# Patient Record
Sex: FEMALE | Race: WHITE | Employment: OTHER | URBAN - METROPOLITAN AREA
[De-identification: names, ages, dates, MRNs, and addresses within clinical notes are randomized per-mention and may not be internally consistent; named-entity substitution may affect disease eponyms.]

---

## 2017-02-22 ENCOUNTER — OFFICE VISIT (OUTPATIENT)
Dept: FAMILY MEDICINE CLINIC | Age: 67
End: 2017-02-22

## 2017-02-22 ENCOUNTER — HOSPITAL ENCOUNTER (OUTPATIENT)
Dept: LAB | Age: 67
Discharge: HOME | End: 2017-02-22
Payer: MEDICARE

## 2017-02-22 VITALS
DIASTOLIC BLOOD PRESSURE: 86 MMHG | RESPIRATION RATE: 16 BRPM | SYSTOLIC BLOOD PRESSURE: 148 MMHG | BODY MASS INDEX: 28.66 KG/M2 | HEIGHT: 60 IN | TEMPERATURE: 96.7 F | WEIGHT: 146 LBS | OXYGEN SATURATION: 98 % | HEART RATE: 64 BPM

## 2017-02-22 DIAGNOSIS — Z51.81 ENCOUNTER FOR MEDICATION MONITORING: ICD-10-CM

## 2017-02-22 DIAGNOSIS — Z00.00 MEDICARE ANNUAL WELLNESS VISIT, SUBSEQUENT: Primary | ICD-10-CM

## 2017-02-22 DIAGNOSIS — I10 ESSENTIAL HYPERTENSION: ICD-10-CM

## 2017-02-22 DIAGNOSIS — Z86.39 H/O: HYPOTHYROIDISM: ICD-10-CM

## 2017-02-22 DIAGNOSIS — Z11.59 NEED FOR HEPATITIS C SCREENING TEST: ICD-10-CM

## 2017-02-22 LAB
BILIRUB UR QL STRIP: NEGATIVE
GLUCOSE UR-MCNC: NEGATIVE MG/DL
KETONES P FAST UR STRIP-MCNC: NEGATIVE MG/DL
PH UR STRIP: 7 [PH] (ref 4.6–8)
PROT UR QL STRIP: NEGATIVE MG/DL
SP GR UR STRIP: 1.01 (ref 1–1.03)
UA UROBILINOGEN AMB POC: NORMAL (ref 0.2–1)
URINALYSIS CLARITY POC: CLEAR
URINALYSIS COLOR POC: YELLOW
URINE BLOOD POC: NEGATIVE
URINE LEUKOCYTES POC: NEGATIVE
URINE NITRITES POC: NEGATIVE

## 2017-02-22 PROCEDURE — 80061 LIPID PANEL: CPT

## 2017-02-22 PROCEDURE — 84443 ASSAY THYROID STIM HORMONE: CPT

## 2017-02-22 PROCEDURE — 36415 COLL VENOUS BLD VENIPUNCTURE: CPT

## 2017-02-22 PROCEDURE — 86803 HEPATITIS C AB TEST: CPT

## 2017-02-22 PROCEDURE — 80053 COMPREHEN METABOLIC PANEL: CPT

## 2017-02-22 PROCEDURE — 85027 COMPLETE CBC AUTOMATED: CPT

## 2017-02-22 PROCEDURE — 84439 ASSAY OF FREE THYROXINE: CPT

## 2017-02-22 RX ORDER — LISINOPRIL 5 MG/1
5 TABLET ORAL EVERY EVENING
COMMUNITY

## 2017-02-22 NOTE — PROGRESS NOTES
Pt here for Medicare Wellness visit. Lipid 1/26/2016    A1C 1/26/2016    Mammogram 1/14/2016    Bone density 1/26/2016    Colonoscopy 11/4/2014 by Dr. Vasquez End- repeat in 5 years.       Verbal order received per Dr. Mathew De La Rosa- obtain UA without micro- VORB

## 2017-02-22 NOTE — PROGRESS NOTES
This is a Subsequent Medicare Annual Wellness Visit providing Personalized Prevention Plan Services (PPPS) (Performed 12 months after initial AWV and PPPS )    I have reviewed the patient's medical history in detail and updated the computerized patient record. BP follow up:  Compliant w/  low salt diet, and exercise. Changed to lisinopril by cardiology. BP home monitoring 130s/70s. Home bp monitoring has been elevated but improved since starting on the medication. No swelling, headache or dizziness. No chest pain, SOB, palpitations. Thyroid Review:  H/o hypothyroidism. Currently not on medication. Thyroid ROS: denies fatigue, weight changes, heat/cold intolerance, bowel/skin changes or CVS symptoms. History     Past Medical History:   Diagnosis Date    Bicuspid aortic valve     Hypertension     Osteoporosis       Past Surgical History:   Procedure Laterality Date    ENDOSCOPY, COLON, DIAGNOSTIC      HX GYN      left ovarian cyst removed    HX ORTHOPAEDIC      cyst left hand    HX ORTHOPAEDIC Bilateral     torn meniscus    HX ORTHOPAEDIC      bakers cyst    HX TONSILLECTOMY       Current Outpatient Prescriptions   Medication Sig Dispense Refill    lisinopril (PRINIVIL, ZESTRIL) 5 mg tablet Take 5 mg by mouth every evening.  fluticasone (FLONASE) 50 mcg/actuation nasal spray 2 Sprays by Both Nostrils route daily as needed for Rhinitis. 3 Bottle 3    raloxifene (EVISTA) 60 mg tablet Take 60 mg by mouth daily.  BIOTIN PO Take 1 tablet by mouth daily.  multivitamin (ONE A DAY) tablet Take 1 tablet by mouth daily.  CETIRIZINE HCL (ZYRTEC PO) Take 1 Tab by mouth daily.  CALCIUM CARBONATE/VITAMIN D3 (CALCIUM + D PO) Take 1 tablet by mouth two (2) times a day.  aspirin delayed-release (ECOTRIN) 325 mg tablet Take 325 mg by mouth daily.        Allergies   Allergen Reactions    Penicillin G Hives     Family History   Problem Relation Age of Onset    Heart Disease Mother     Cancer Mother 80     colorectal cancer    Cancer Father      neck    Cancer Maternal Grandmother      ?colon cancer    Cancer Paternal Grandmother      ?colon cancer    Cancer Other      breast - cousins     Social History   Substance Use Topics    Smoking status: Never Smoker    Smokeless tobacco: Never Used    Alcohol use 0.0 oz/week     0 Standard drinks or equivalent per week      Comment: occasionally - very little     Patient Active Problem List   Diagnosis Code    Osteoporosis M81.0    Bicuspid aortic valve Q23.1    H/O: hypothyroidism Z86.39    Essential hypertension I10    Encounter for medication monitoring Z51.81       Depression Risk Factor Screening:     PHQ 2 / 9, over the last two weeks 2/22/2017   Little interest or pleasure in doing things Not at all   Feeling down, depressed or hopeless Not at all   Total Score PHQ 2 0     Alcohol Risk Factor Screening: On any occasion during the past 3 months, have you had more than 3 drinks containing alcohol? No    Do you average more than 7 drinks per week? No      Functional Ability and Level of Safety:     Hearing Loss   normal-to-mild    Activities of Daily Living   Self-care. Requires assistance with: no ADLs    Fall Risk     Fall Risk Assessment, last 12 mths 2/22/2017   Able to walk? Yes   Fall in past 12 months?  No     Abuse Screen   Patient is not abused    Review of Systems   Constitutional: negative for fatigue and malaise  Eyes: negative for irritation and redness  Ears, nose, mouth, throat, and face: negative for earaches, nasal congestion and hoarseness  Respiratory: negative for cough, sputum or dyspnea on exertion  Cardiovascular: negative for chest pain, chest pressure/discomfort, dyspnea, palpitations, irregular heart beats, fatigue, lower extremity edema  Gastrointestinal: negative for dysphagia, dyspepsia, reflux symptoms, nausea, vomiting, change in bowel habits, melena, constipation and abdominal pain  Genitourinary:negative for frequency, dysuria, nocturia, urinary incontinence. Integument/breast: negative for rash and dryness  Hematologic/lymphatic: negative for easy bruising and lymphadenopathy  Musculoskeletal:negative for myalgias, arthralgias, stiff joints, neck pain, back pain and muscle weakness  Neurological: negative for headaches, dizziness, tremor and weakness  Endocrine: negative for diabetic symptoms including polyuria and polydipsia        Physical Examination     Evaluation of Cognitive Function:  Mood/affect:  happy  Appearance: age appropriate  Family member/caregiver input: NA    Visit Vitals    /86    Pulse 64    Temp 96.7 °F (35.9 °C) (Oral)    Resp 16    Ht 5' (1.524 m)    Wt 146 lb (66.2 kg)    LMP 01/01/2004    SpO2 98%    BMI 28.51 kg/m2     General:  Alert, cooperative, no distress, appears stated age. Head:  Normocephalic, without obvious abnormality, atraumatic. Eyes:  Conjunctivae/corneas clear. PERRL, EOMs intact. Fundi benign. Ears:  Normal TMs and external ear canals both ears. Nose: Nares normal. Septum midline. Mucosa normal. No drainage or sinus tenderness. Throat: Lips, mucosa, and tongue normal. Teeth and gums normal.   Neck: Supple, symmetrical, trachea midline, no adenopathy, thyroid: no enlargement/tenderness/nodules, no carotid bruit and no JVD. Back:   Symmetric, no curvature. ROM normal. No CVA tenderness. Lungs:   Clear to auscultation bilaterally. Chest wall:  No tenderness or deformity. Heart:  Regular rate and rhythm, S1, S2 normal, no murmur, click, rub or gallop. Breast Exam:  No tenderness, masses, or nipple abnormality. Abdomen:   Soft, non-tender. Bowel sounds normal. No masses,  No organomegaly. Genitalia:  Deferred    Rectal:  Deferred    Extremities: Extremities normal, atraumatic, no cyanosis or edema. Pulses: 2+ and symmetric all extremities. Skin: Skin color, texture, turgor normal. No rashes or lesions. Lymph nodes: Cervical, supraclavicular, and axillary nodes normal.   Neurologic: CNII-XII intact. Normal strength, sensation and reflexes throughout. Patient Care Team:  Ning Holman MD as PCP - General    Advice/Referrals/Counseling   Education and counseling provided:  Are appropriate based on today's review and evaluation  End-of-Life planning (with patient's consent)    Assessment/Plan   Skylar Fernandez was seen today for annual wellness visit. Diagnoses and all orders for this visit:    Medicare annual wellness visit, subsequent  -     AMB POC URINALYSIS DIP STICK AUTO W/O MICRO    Essential hypertension  -     LIPID PANEL  Discussed sodium restriction, high k rich diet, maintaining ideal body weight and regular exercise program such as daily walking 30 min perday 4-5 times per week, as physiologic means to achieve blood pressure control.  Medication compliance advised. H/O: hypothyroidism  -     TSH 3RD GENERATION  -     T4, FREE    Encounter for medication monitoring  -     METABOLIC PANEL, COMPREHENSIVE  -     CBC W/O DIFF    Need for hepatitis C screening test  -     HEPATITIS C AB      Follow-up Disposition: reviewed diet, exercise and weight control  cardiovascular risk and specific lipid/LDL goals reviewed  reviewed medications and side effects in detail. I have discussed diagnosis listed in this note with pt and/or family. I have discussed treatment plans and options and the risk/benefit analysis of those options, including safe use of medications and possible medication side effects. Through the use of shared decision making we have agreed to the above plan. The patient has received an after-visit summary and questions were answered concerning future plans and follow up. Advise pt of any urgent changes then to proceed to the ER.

## 2017-02-22 NOTE — MR AVS SNAPSHOT
Visit Information Date & Time Provider Department Dept. Phone Encounter #  
 2/22/2017 10:15 AM Ginette Ventura MD [facility] [phone] [id number] Upcoming Health Maintenance Date Due Hepatitis C Screening 1950 GLAUCOMA SCREENING Q2Y 3/4/2015 Pneumococcal 65+ Low/Medium Risk (2 of 2 - PPSV23) 1/26/2017 BREAST CANCER SCRN MAMMOGRAM 1/14/2018 MEDICARE YEARLY EXAM 2/23/2018 COLONOSCOPY 11/4/2019 DTaP/Tdap/Td series (2 - Td) 1/7/2024 Allergies as of 2/22/2017  Review Complete On: 2/22/2017 By: Ginette Ventura MD  
  
 Severity Noted Reaction Type Reactions Penicillin G Low 09/02/2011    Hives Current Immunizations  Reviewed on 2/22/2017 Name Date Influenza High Dose Vaccine PF 10/20/2016, 12/14/2015 Influenza Vaccine 12/18/2012 Influenza Vaccine (Quad) PF 1/28/2015 Influenza Vaccine PF 1/7/2014 Pneumococcal Polysaccharide (PPSV-23) 1/26/2016 Tdap 1/7/2014 Reviewed by Mateo Thibodeaux LPN on 0/59/7390 at 61:01 AM  
You Were Diagnosed With   
  
 Codes Comments Medicare annual wellness visit, subsequent    -  Primary ICD-10-CM: Z00.00 ICD-9-CM: V70.0 Essential hypertension     ICD-10-CM: I10 
ICD-9-CM: 401.9 H/O: hypothyroidism     ICD-10-CM: Z86.39 
ICD-9-CM: V12.29 Encounter for medication monitoring     ICD-10-CM: Z51.81 
ICD-9-CM: V58.83 Vitals BP  
  
  
  
  
  
 148/86 Vitals History BMI and BSA Data Body Mass Index Body Surface Area 28.51 kg/m 2 1.67 m 2 Preferred Pharmacy Pharmacy Name [address] [facility] [phone] Your Updated Medication List  
  
   
This list is accurate as of: 2/22/17  4:18 PM.  Always use your most recent med list.  
  
  
  
  
 BIOTIN PO Take 1 tablet by mouth daily. CALCIUM + D PO Take 1 tablet by mouth two (2) times a day. ECOTRIN 325 mg tablet Generic drug:  aspirin delayed-release Take 325 mg by mouth daily. fluticasone 50 mcg/actuation nasal spray Commonly known as:  Drew Riser 2 Sprays by Both Nostrils route daily as needed for Rhinitis. lisinopril 5 mg tablet Commonly known as:  Elmer Shutters Take 5 mg by mouth every evening.  
  
 multivitamin tablet Commonly known as:  ONE A DAY Take 1 tablet by mouth daily. raloxifene 60 mg tablet Commonly known as:  EVISTA Take 60 mg by mouth daily. ZYRTEC PO Take 1 Tab by mouth daily. We Performed the Following AMB POC URINALYSIS DIP STICK AUTO W/O MICRO [65467 CPT(R)] CBC W/O DIFF [78836 CPT(R)] LIPID PANEL [54445 CPT(R)] METABOLIC PANEL, COMPREHENSIVE [64234 CPT(R)] T4, FREE [id number] CPT(R)] TSH 3RD GENERATION [68652 CPT(R)] Introducing [facility]! Dear Arleen Garvey: 
Thank you for requesting a [employer] account. Our records indicate that you have previously registered for a [employer] account but its currently inactive. Please call our [employer] support line at [phone]. Additional Information If you have questions, please visit the Frequently Asked Questions section of the [employer] website at [email or web address] Remember, [employer] is NOT to be used for urgent needs. For medical emergencies, dial 911. Now available from your iPhone and Android! Please provide this summary of care documentation to your next provider. Your primary care clinician is listed as Johnny Lloyd. If you have any questions after today's visit, please call [phone].

## 2017-02-23 LAB
ALBUMIN SERPL-MCNC: 4.5 G/DL (ref 3.6–4.8)
ALBUMIN/GLOB SERPL: 1.9 {RATIO} (ref 1.1–2.5)
ALP SERPL-CCNC: 50 IU/L (ref 39–117)
ALT SERPL-CCNC: 17 IU/L (ref 0–32)
AST SERPL-CCNC: 17 IU/L (ref 0–40)
BILIRUB SERPL-MCNC: 0.3 MG/DL (ref 0–1.2)
BUN SERPL-MCNC: 16 MG/DL (ref 8–27)
BUN/CREAT SERPL: 20 (ref 11–26)
CALCIUM SERPL-MCNC: 9.7 MG/DL (ref 8.7–10.3)
CHLORIDE SERPL-SCNC: 102 MMOL/L (ref 96–106)
CHOLEST SERPL-MCNC: 223 MG/DL (ref 100–199)
CO2 SERPL-SCNC: 25 MMOL/L (ref 18–29)
CREAT SERPL-MCNC: 0.79 MG/DL (ref 0.57–1)
ERYTHROCYTE [DISTWIDTH] IN BLOOD BY AUTOMATED COUNT: 14 % (ref 12.3–15.4)
GLOBULIN SER CALC-MCNC: 2.4 G/DL (ref 1.5–4.5)
GLUCOSE SERPL-MCNC: 87 MG/DL (ref 65–99)
HCT VFR BLD AUTO: 37.1 % (ref 34–46.6)
HDLC SERPL-MCNC: 77 MG/DL
HGB BLD-MCNC: 12.3 G/DL (ref 11.1–15.9)
INTERPRETATION, 910389: NORMAL
LDLC SERPL CALC-MCNC: 131 MG/DL (ref 0–99)
MCH RBC QN AUTO: 30.1 PG (ref 26.6–33)
MCHC RBC AUTO-ENTMCNC: 33.2 G/DL (ref 31.5–35.7)
MCV RBC AUTO: 91 FL (ref 79–97)
PLATELET # BLD AUTO: 217 X10E3/UL (ref 150–379)
POTASSIUM SERPL-SCNC: 4.3 MMOL/L (ref 3.5–5.2)
PROT SERPL-MCNC: 6.9 G/DL (ref 6–8.5)
RBC # BLD AUTO: 4.08 X10E6/UL (ref 3.77–5.28)
SODIUM SERPL-SCNC: 144 MMOL/L (ref 134–144)
T4 FREE SERPL-MCNC: 1.32 NG/DL (ref 0.82–1.77)
TRIGL SERPL-MCNC: 74 MG/DL (ref 0–149)
TSH SERPL DL<=0.005 MIU/L-ACNC: 1.68 UIU/ML (ref 0.45–4.5)
VLDLC SERPL CALC-MCNC: 15 MG/DL (ref 5–40)
WBC # BLD AUTO: 5.5 X10E3/UL (ref 3.4–10.8)

## 2017-02-24 LAB — HCV AB S/CO SERPL IA: <0.1 S/CO RATIO (ref 0–0.9)

## 2017-05-03 DIAGNOSIS — G47.30 SLEEP APNEA, UNSPECIFIED TYPE: Primary | ICD-10-CM

## 2017-06-14 ENCOUNTER — OFFICE VISIT (OUTPATIENT)
Dept: SLEEP MEDICINE | Age: 67
End: 2017-06-14

## 2017-06-14 VITALS
BODY MASS INDEX: 28.66 KG/M2 | DIASTOLIC BLOOD PRESSURE: 84 MMHG | TEMPERATURE: 97.8 F | HEART RATE: 87 BPM | HEIGHT: 60 IN | SYSTOLIC BLOOD PRESSURE: 125 MMHG | WEIGHT: 146 LBS | OXYGEN SATURATION: 94 %

## 2017-06-14 DIAGNOSIS — G47.33 OSA (OBSTRUCTIVE SLEEP APNEA): Primary | ICD-10-CM

## 2017-06-14 DIAGNOSIS — E66.3 OVERWEIGHT (BMI 25.0-29.9): ICD-10-CM

## 2017-06-14 NOTE — MR AVS SNAPSHOT
Visit Information Date & Time Provider Department Dept. Phone Encounter #  
 6/14/2017 10:20 AM Buck Felix MD [address] [address] [id number] Follow-up Instructions Return for call with results. Follow-up and Disposition History Upcoming Health Maintenance Date Due  
 GLAUCOMA SCREENING Q2Y 3/4/2015 Pneumococcal 65+ Low/Medium Risk (2 of 2 - PCV13) 1/26/2017 INFLUENZA AGE 9 TO ADULT 8/1/2017 BREAST CANCER SCRN MAMMOGRAM 1/14/2018 MEDICARE YEARLY EXAM 2/23/2018 COLONOSCOPY 11/4/2019 DTaP/Tdap/Td series (2 - Td) 1/7/2024 Allergies as of 6/14/2017  Review Complete On: 6/14/2017 By: Buck Felix MD  
  
 Severity Noted Reaction Type Reactions Penicillin G Low 09/02/2011    Hives Current Immunizations  Reviewed on 2/22/2017 Name Date Influenza High Dose Vaccine PF 10/20/2016, 12/14/2015 Influenza Vaccine 12/18/2012 Influenza Vaccine (Quad) PF 1/28/2015 Influenza Vaccine PF 1/7/2014 Pneumococcal Polysaccharide (PPSV-23) 1/26/2016 Tdap 1/7/2014 Not reviewed this visit You Were Diagnosed With   
  
 Codes Comments JUSTA (obstructive sleep apnea)    -  Primary ICD-10-CM: G47.33 
ICD-9-CM: 327.23 Overweight (BMI 25.0-29. 9)     ICD-10-CM: H24.9 ICD-9-CM: 278.02 Vitals BP Pulse Temp Height(growth percentile) Weight(growth percentile) LMP  
 125/84 87 97.8 °F (36.6 °C) 5' (1.524 m) 146 lb (66.2 kg) 01/01/2004 SpO2 BMI OB Status Smoking Status 94% 28.51 kg/m2 Postmenopausal Never Smoker BMI and BSA Data Body Mass Index Body Surface Area 28.51 kg/m 2 1.67 m 2 Preferred Pharmacy Pharmacy Name Phone Willie Painting [facility] [phone] Your Updated Medication List  
  
   
This list is accurate as of: 6/14/17 10:35 AM.  Always use your most recent med list.  
  
  
  
  
 BIOTIN PO  
 Take 1 tablet by mouth daily. CALCIUM + D PO Take 1 tablet by mouth two (2) times a day. ECOTRIN 325 mg tablet Generic drug:  aspirin delayed-release Take 325 mg by mouth daily. fluticasone 50 mcg/actuation nasal spray Commonly known as:  Mary Earnest 2 Sprays by Both Nostrils route daily as needed for Rhinitis. lisinopril 5 mg tablet Commonly known as:  Sydna Lies Take 5 mg by mouth every evening.  
  
 multivitamin tablet Commonly known as:  ONE A DAY Take 1 tablet by mouth daily. raloxifene 60 mg tablet Commonly known as:  EVISTA Take 60 mg by mouth daily. ZYRTEC PO Take 1 Tab by mouth daily. We Performed the Following SLEEP STUDY UNATTENDED, RESPIRATORY EFFORT  [83302 CPT(R)] Follow-up Instructions Return for call with results. Patient Instructions 217 [facility]., [place]. 1668 [facility], [facility] Tel.  [phone] Fax. 100 [facility] [address] Tel.  [phone] Fax. [phone] [address] [address] Tel.  [phone] Fax. [phone] Sleep Apnea: After Your Visit Your Care Instructions Sleep apnea occurs when you frequently stop breathing for 10 seconds or longer during sleep. It can be mild to severe, based on the number of times per hour that you stop breathing or have slowed breathing. Blocked or narrowed airways in your nose, mouth, or throat can cause sleep apnea. Your airway can become blocked when your throat muscles and tongue relax during sleep. Sleep apnea is common, occurring in 1 out of 20 individuals. Individuals having any of the following characteristics should be evaluated and treated right away due to high risk and detrimental consequences from untreated sleep apnea: 
1. Obesity 2. Congestive Heart failure 3. Atrial Fibrillation 4. Uncontrolled Hypertension 5. Type II Diabetes 6. Night-time Arrhythmias 7. Stroke 8. Pulmonary Hypertension 9. High-risk Driving Populations (pilots, truck drivers, etc.) 10. Patients Considering Weight-loss Surgery How do you know you have sleep apnea? You probably have sleep apnea if you answer 'yes' to 3 or more of the following questions: S - Have you been told that you Snore? T - Are you often Tired during the day? O - Has anyone Observed you stop breathing while sleeping? P- Do you have (or are being treated for) high blood Pressure? B - Are you obese (Body Mass Index > 35)? A - Is your Age 48years old or older? N - Is your Neck size greater than 16 inches? G - Are you male Gender? A sleep physician can prescribe a breathing device that prevents tissues in the throat from blocking your airway. Or your doctor may recommend using a dental device (oral breathing device) to help keep your airway open. In some cases, surgery may be needed to remove enlarged tissues in the throat. Follow-up care is a key part of your treatment and safety. Be sure to make and go to all appointments, and call your doctor if you are having problems. It's also a good idea to know your test results and keep a list of the medicines you take. How can you care for yourself at home? · Lose weight, if needed. It may reduce the number of times you stop breathing or have slowed breathing. · Go to bed at the same time every night. · Sleep on your side. It may stop mild apnea. If you tend to roll onto your back, sew a pocket in the back of your pajama top. Put a tennis ball into the pocket, and stitch the pocket shut. This will help keep you from sleeping on your back. · Avoid alcohol and medicines such as sleeping pills and sedatives before bed. · Do not smoke. Smoking can make sleep apnea worse. If you need help quitting, talk to your doctor about stop-smoking programs and medicines. These can increase your chances of quitting for good. · Prop up the head of your bed 4 to 6 inches by putting bricks under the legs of the bed. · Treat breathing problems, such as a stuffy nose, caused by a cold or allergies. · Use a continuous positive airway pressure (CPAP) breathing machine if lifestyle changes do not help your apnea and your doctor recommends it. The machine keeps your airway from closing when you sleep. · If CPAP does not help you, ask your doctor whether you should try other breathing machines. A bilevel positive airway pressure machine has two types of air pressureâone for breathing in and one for breathing out. Another device raises or lowers air pressure as needed while you breathe. · If your nose feels dry or bleeds when using one of these machines, talk with your doctor about increasing moisture in the air. A humidifier may help. · If your nose is runny or stuffy from using a breathing machine, talk with your doctor about using decongestants or a corticosteroid nasal spray. When should you call for help? Watch closely for changes in your health, and be sure to contact your doctor if: 
· You still have sleep apnea even though you have made lifestyle changes. · You are thinking of trying a device such as CPAP. · You are having problems using a CPAP or similar machine. Where can you learn more? Go to Extended Systems.be. Enter Z623 in the search box to learn more about \"Sleep Apnea: After Your Visit. \"  
© 1979-3213 Healthwise, Incorporated. Care instructions adapted under license by Rylan Roy (which disclaims liability or warranty for this information). This care instruction is for use with your licensed healthcare professional. If you have questions about a medical condition or this instruction, always ask your healthcare professional. Maria Teresa Ulloas any warranty or liability for your use of this information. PROPER SLEEP HYGIENE What to avoid · Do not have drinks with caffeine, such as coffee or black tea, for 8 hours before bed. · Do not smoke or use other types of tobacco near bedtime. Nicotine is a stimulant and can keep you awake. · Avoid drinking alcohol late in the evening, because it can cause you to wake in the middle of the night. · Do not eat a big meal close to bedtime. If you are hungry, eat a light snack. · Do not drink a lot of water close to bedtime, because the need to urinate may wake you up during the night. · Do not read or watch TV in bed. Use the bed only for sleeping and sexual activity. What to try · Go to bed at the same time every night, and wake up at the same time every morning. Do not take naps during the day. · Keep your bedroom quiet, dark, and cool. · Get regular exercise, but not within 3 to 4 hours of your bedtime. Maria Dolores Reid · Sleep on a comfortable pillow and mattress. · If watching the clock makes you anxious, turn it facing away from you so you cannot see the time. · If you worry when you lie down, start a worry book. Well before bedtime, write down your worries, and then set the book and your concerns aside. · Try meditation or other relaxation techniques before you go to bed. · If you cannot fall asleep, get up and go to another room until you feel sleepy. Do something relaxing. Repeat your bedtime routine before you go to bed again. · Make your house quiet and calm about an hour before bedtime. Turn down the lights, turn off the TV, log off the computer, and turn down the volume on music. This can help you relax after a busy day. Drowsy Driving [employer] cites drowsiness as a causing factor in more than 098,097 police reported crashes annually, resulting in 76,000 injuries and 1,500 deaths.  Other surveys suggest 55% of people polled have driven while drowsy in the past year, 23% had fallen asleep but not crashed, 3% crashed, and 2% had and accident due to drowsy driving. Who is at risk? Young Drivers: One study of drowsy driving accidents states that 55% of the drivers were under 25 years. Of those, 75% were male. Shift Workers and Travelers: People who work overnight or travel across time zones frequently are at higher risk of experiencing Circadian Rhythm Disorders. They are trying to work and function when their body is programed to sleep. Sleep Deprived: Lack of sleep has a serious impact on your ability to pay attention or focus on a task. Consistently getting less than the average of 8 hours your body needs creates partial or cumulative sleep deprivation. Untreated Sleep Disorders: Sleep Apnea, Narcolepsy, R.L.S., and other sleep disorders (untreated) prevent a person from getting enough restful sleep. This leads to excessive daytime sleepiness and increases the risk for drowsy driving accidents by up to 7 times. Medications / Alcohol: Even over the counter medications can cause drowsiness. Medications that impair a drivers attention should have a warning label. Alcohol naturally makes you sleepy and on its own can cause accidents. Combined with excessive drowsiness its effects are amplified. Signs of Drowsy Driving: * You don't remember driving the last few miles * You may drift out of your ronn * You are unable to focus and your thoughts wander * You may yawn more often than normal 
 * You have difficulty keeping your eyes open / nodding off * Missing traffic signs, speeding, or tailgating Prevention-  
Good sleep hygiene, lifestyle and behavioral choices have the most impact on drowsy driving. There is no substitute for sleep and the average person requires 8 hours nightly. If you find yourself driving drowsy, stop and sleep. Consider the sleep hygiene tips provided during your visit as well.   
 
Medication Refill Policy: Refills for all medications require 1 week advance notice. Please have your pharmacy fax a refill request. We are unable to fax, or call in \"controled substance\" medications and you will need to pick these prescriptions up from our office. MyChart Activation Thank you for requesting access to [employer]. Please follow the instructions below to securely access and download your online medical record. [employer] allows you to send messages to your doctor, view your test results, renew your prescriptions, schedule appointments, and more. How Do I Sign Up? 1. In your internet browser, go to [email or web address]. 2. Click on the First Time User? Click Here link in the Sign In box. You will see the New Member Sign Up page. 3. Enter your [employer] Access Code exactly as it appears below. You will not need to use this code after youve completed the sign-up process. If you do not sign up before the expiration date, you must request a new code. [employer] Access Code: [email or web address] Expires: 2017 10:31 AM (This is the date your [employer] access code will ) 4. Enter the last four digits of your Social Security Number (xxxx) and Date of Birth (mm/dd/yyyy) as indicated and click Submit. You will be taken to the next sign-up page. 5. Create a [employer] ID. This will be your [employer] login ID and cannot be changed, so think of one that is secure and easy to remember. 6. Create a [employer] password. You can change your password at any time. 7. Enter your Password Reset Question and Answer. This can be used at a later time if you forget your password. 8. Enter your e-mail address. You will receive e-mail notification when new information is available in [address] 9. Click Sign Up. You can now view and download portions of your medical record. 10. Click the Download Summary menu link to download a portable copy of your medical information. Additional Information If you have questions, please call [phone]. Remember, MyChart is NOT to be used for urgent needs. For medical emergencies, dial 911. Starting a Weight Loss Plan: After Your Visit Your Care Instructions If you are thinking about losing weight, it can be hard to know where to start. Your doctor can help you set up a weight loss plan that best meets your needs. You may want to take a class on nutrition or exercise, or join a weight loss support group. If you have questions about how to make changes to your eating or exercise habits, ask your doctor about seeing a registered dietitian or an exercise specialist. 
It can be a big challenge to lose weight. But you do not have to make huge changes at once. Make small changes, and stick with them. When those changes become habit, add a few more changes. If you do not think you are ready to make changes right now, try to pick a date in the future. Make an appointment to see your doctor to discuss whether the time is right for you to start a plan. Follow-up care is a key part of your treatment and safety. Be sure to make and go to all appointments, and call your doctor if you are having problems. It's also a good idea to know your test results and keep a list of the medicines you take. How can you care for yourself at home? · Set realistic goals. Many people expect to lose much more weight than is likely. A weight loss of 5% to 10% of your body weight may be enough to improve your health. · Get family and friends involved to provide support. Talk to them about why you are trying to lose weight, and ask them to help. They can help by participating in exercise and having meals with you, even if they may be eating something different. · Find what works best for you. If you do not have time or do not like to cook, a program that offers meal replacement bars or shakes may be better for you.  Or if you like to prepare meals, finding a plan that includes daily menus and recipes may be best. 
· Ask your doctor about other health professionals who can help you achieve your weight loss goals. ¨ A dietitian can help you make healthy changes in your diet. ¨ An exercise specialist or  can help you develop a safe and effective exercise program. 
¨ A counselor or psychiatrist can help you cope with issues such as depression, anxiety, or family problems that can make it hard to focus on weight loss. · Consider joining a support group for people who are trying to lose weight. Your doctor can suggest groups in your area. Where can you learn more? Go to Roovyn.be Enter T308 in the search box to learn more about \"Starting a Weight Loss Plan: After Your Visit. \"  
© 9152-8687 Healthwise, Incorporated. Care instructions adapted under license by Jonathan Fox (which disclaims liability or warranty for this information). This care instruction is for use with your licensed healthcare professional. If you have questions about a medical condition or this instruction, always ask your healthcare professional. [address] any warranty or liability for your use of this information. Content Version: 5.9.68115; Last Revised: September 1, 2009 Introducing [facility]! Jonathan Fox introduces [employer] patient portal. Now you can access parts of your medical record, email your doctor's office, and request medication refills online. 1. In your internet browser, go to [email or web address] 2. Click on the First Time User? Click Here link in the Sign In box. You will see the New Member Sign Up page. 3. Enter your [employer] Access Code exactly as it appears below. You will not need to use this code after youve completed the sign-up process. If you do not sign up before the expiration date, you must request a new code. · [employer] Access Code: [email or web address] Expires: 9/12/2017 10:31 AM 
 
4. Enter the last four digits of your Social Security Number (xxxx) and Date of Birth (mm/dd/yyyy) as indicated and click Submit. You will be taken to the next sign-up page. 5. Create a [employer] ID. This will be your [employer] login ID and cannot be changed, so think of one that is secure and easy to remember. 6. Create a [employer] password. You can change your password at any time. 7. Enter your Password Reset Question and Answer. This can be used at a later time if you forget your password. 8. Enter your e-mail address. You will receive e-mail notification when new information is available in [address] 9. Click Sign Up. You can now view and download portions of your medical record. 10. Click the Download Summary menu link to download a portable copy of your medical information. If you have questions, please visit the Frequently Asked Questions section of the [employer] website. Remember, [employer] is NOT to be used for urgent needs. For medical emergencies, dial 911. Now available from your iPhone and Android! Please provide this summary of care documentation to your next provider. Your primary care clinician is listed as Aisha Rosenthal. If you have any questions after today's visit, please call [phone].

## 2017-06-14 NOTE — PROGRESS NOTES
217 [facility]., [place]. [place], [address]  Tel.  [phone]  Fax. 100 [facility] 60  [place], [address] [facility]  Tel.  [phone]  Fax. [phone] 71 [facility] [address]   Tel.  [phone]  Fax. [phone]         Subjective: Mae Freeman is an 79 y.o. female referred for evaluation for a sleep disorder. She complains of excessive daytime sleepiness associated with awakening in the middle of the night because of restlessness. Symptoms began several years ago, unchanged since that time. She usually can fall asleep in 5 minutes. Family or house members note snoring. She denies completely or partially paralyzed while falling asleep or waking up. Mae Freeman does wake up frequently at night. She is not bothered by waking up too early and left unable to get back to sleep. She actually sleeps about 8 hours at night and wakes up about 3 times during the night. She does not work shifts: Tequila Clement Ing indicates she does not get too little sleep at night. Her bedtime is 2100. She awakens at 0530. She does take naps. She takes 1 naps a week lasting 45 min to an hour. She has the following observed behaviors: Loud snoring, Light snoring;  . Other remarks:      [place] Sleepiness Score: 14   which reflect moderate daytime drowsiness. Allergies   Allergen Reactions    Penicillin G Hives         Current Outpatient Prescriptions:     lisinopril (PRINIVIL, ZESTRIL) 5 mg tablet, Take 5 mg by mouth every evening., Disp: , Rfl:     fluticasone (FLONASE) 50 mcg/actuation nasal spray, 2 Sprays by Both Nostrils route daily as needed for Rhinitis., Disp: 3 Bottle, Rfl: 3    raloxifene (EVISTA) 60 mg tablet, Take 60 mg by mouth daily. , Disp: , Rfl:     BIOTIN PO, Take 1 tablet by mouth daily. , Disp: , Rfl:     multivitamin (ONE A DAY) tablet, Take 1 tablet by mouth daily. , Disp: , Rfl:     CETIRIZINE HCL (ZYRTEC PO), Take 1 Tab by mouth daily. , Disp: , Rfl:     CALCIUM CARBONATE/VITAMIN D3 (CALCIUM + D PO), Take 1 tablet by mouth two (2) times a day., Disp: , Rfl:     aspirin delayed-release (ECOTRIN) 325 mg tablet, Take 325 mg by mouth daily. , Disp: , Rfl:      She  has a past medical history of Bicuspid aortic valve; Hypertension; and Osteoporosis. She also has no past medical history of Unspecified adverse effect of anesthesia or Unspecified sleep apnea. She  has a past surgical history that includes tonsillectomy; endoscopy, colon, diagnostic; orthopaedic; orthopaedic (Bilateral); orthopaedic; and gyn. She family history includes Cancer in her father, maternal grandmother, paternal grandmother, and another family member; Cancer (age of onset: 80) in her mother; Heart Disease in her mother. She  reports that she has never smoked. She has never used smokeless tobacco. She reports that she drinks alcohol. She reports that she does not use illicit drugs. Review of Systems:  Constitutional:  No significant weight loss or weight gain. Eyes:  No blurred vision. CVS:  No significant chest pain  Pulm:  No significant shortness of breath  GI:  No significant nausea or vomiting  :  No significant nocturia  Musculoskeletal:  No significant joint pain at night  Skin:  No significant rashes  Neuro:  No significant dizziness   Psych:  No active mood issues    Sleep Review of Systems: notable for no difficulty falling asleep; infrequent awakenings at night;  irregular dreaming noted; no nightmares ; no early morning headaches; no memory problems; no concentration issues; no history of any automobile or occupational accidents due to daytime drowsiness.       Objective:     Visit Vitals    /84    Pulse 87    Temp 97.8 °F (36.6 °C)    Ht 5' (1.524 m)    Wt 146 lb (66.2 kg)    LMP 01/01/2004    SpO2 94%    BMI 28.51 kg/m2         General:   Not in acute distress   Eyes:  Anicteric sclerae, no obvious strabismus   Nose:  No obvious nasal septum deviation    Oropharynx:   Class 3 oropharyngeal outlet, thick tongue base,low-lying soft palate, narrow tonsilo-pharyngeal pilars   Tonsils:   tonsils are unappreciated   Neck:   Neck circ. in \"inches\": 12; midline trachea   Chest/Lungs:  Equal lung expansion, clear on auscultation    CVS:  Normal rate, regular rhythm; no JVD   Skin:  Warm to touch; no obvious rashes   Neuro:  No focal deficits ; no obvious tremor    Psych:  Normal affect,  normal countenance;          Assessment:       ICD-10-CM ICD-9-CM    1. JUSTA (obstructive sleep apnea) G47.33 327.23 SLEEP STUDY UNATTENDED, RESPIRATORY EFFORT    2. Overweight (BMI 25.0-29. 9) E66.3 278.02          Plan:     * The patient currently has a Moderate Risk for having sleep apnea. STOP-BANG score 4.  * HSAT was ordered for initial evaluation. * She was provided information on sleep apnea including coresponding risk factors and the importance of proper treatment. * Counseling was provided regarding proper sleep hygiene and safe driving. * We'll call her with test results. I have reviewed/discussed the above normal BMI with the patient. I have recommended the following interventions: dietary management education, guidance, and counseling . Arcelia Pastrana Thank you for allowing us to participate in your patient's medical care. We'll keep you updated on these investigations.     Lynne Espinal M.D.  (electronically signed)  Diplomate in Sleep Medicine, [facility]

## 2017-06-14 NOTE — PATIENT INSTRUCTIONS
[address] [place]. [place], [address]  Tel.  [phone]  Fax. 100 [facility] 60  [place], [address] [facility]  Tel.  [phone]  Fax. [phone] [address] [facility] [address]  Tel.  [phone]  Fax. [phone]     Sleep Apnea: After Your Visit  Your Care Instructions  Sleep apnea occurs when you frequently stop breathing for 10 seconds or longer during sleep. It can be mild to severe, based on the number of times per hour that you stop breathing or have slowed breathing. Blocked or narrowed airways in your nose, mouth, or throat can cause sleep apnea. Your airway can become blocked when your throat muscles and tongue relax during sleep. Sleep apnea is common, occurring in 1 out of 20 individuals. Individuals having any of the following characteristics should be evaluated and treated right away due to high risk and detrimental consequences from untreated sleep apnea:  1. Obesity  2. Congestive Heart failure  3. Atrial Fibrillation  4. Uncontrolled Hypertension  5. Type II Diabetes  6. Night-time Arrhythmias  7. Stroke  8. Pulmonary Hypertension  9. High-risk Driving Populations (pilots, truck drivers, etc.)  10. Patients Considering Weight-loss Surgery    How do you know you have sleep apnea? You probably have sleep apnea if you answer 'yes' to 3 or more of the following questions:  S - Have you been told that you Snore? T - Are you often Tired during the day? O - Has anyone Observed you stop breathing while sleeping? P- Do you have (or are being treated for) high blood Pressure? B - Are you obese (Body Mass Index > 35)? A - Is your Age 48years old or older? N - Is your Neck size greater than 16 inches? G - Are you male Gender? A sleep physician can prescribe a breathing device that prevents tissues in the throat from blocking your airway.  Or your doctor may recommend using a dental device (oral breathing device) to help keep your airway open. In some cases, surgery may be needed to remove enlarged tissues in the throat. Follow-up care is a key part of your treatment and safety. Be sure to make and go to all appointments, and call your doctor if you are having problems. It's also a good idea to know your test results and keep a list of the medicines you take. How can you care for yourself at home? · Lose weight, if needed. It may reduce the number of times you stop breathing or have slowed breathing. · Go to bed at the same time every night. · Sleep on your side. It may stop mild apnea. If you tend to roll onto your back, sew a pocket in the back of your pajama top. Put a tennis ball into the pocket, and stitch the pocket shut. This will help keep you from sleeping on your back. · Avoid alcohol and medicines such as sleeping pills and sedatives before bed. · Do not smoke. Smoking can make sleep apnea worse. If you need help quitting, talk to your doctor about stop-smoking programs and medicines. These can increase your chances of quitting for good. · Prop up the head of your bed 4 to 6 inches by putting bricks under the legs of the bed. · Treat breathing problems, such as a stuffy nose, caused by a cold or allergies. · Use a continuous positive airway pressure (CPAP) breathing machine if lifestyle changes do not help your apnea and your doctor recommends it. The machine keeps your airway from closing when you sleep. · If CPAP does not help you, ask your doctor whether you should try other breathing machines. A bilevel positive airway pressure machine has two types of air pressureâone for breathing in and one for breathing out. Another device raises or lowers air pressure as needed while you breathe. · If your nose feels dry or bleeds when using one of these machines, talk with your doctor about increasing moisture in the air. A humidifier may help.   · If your nose is runny or stuffy from using a breathing machine, talk with your doctor about using decongestants or a corticosteroid nasal spray. When should you call for help? Watch closely for changes in your health, and be sure to contact your doctor if:  · You still have sleep apnea even though you have made lifestyle changes. · You are thinking of trying a device such as CPAP. · You are having problems using a CPAP or similar machine. Where can you learn more? Go to [employer]. Enter P381 in the search box to learn more about \"Sleep Apnea: After Your Visit. \"   © 9665-9197 Healthwise, Incorporated. Care instructions adapted under license by Brendan Martin (which disclaims liability or warranty for this information). This care instruction is for use with your licensed healthcare professional. If you have questions about a medical condition or this instruction, always ask your healthcare professional. Antonella Aldo any warranty or liability for your use of this information. PROPER SLEEP HYGIENE    What to avoid  · Do not have drinks with caffeine, such as coffee or black tea, for 8 hours before bed. · Do not smoke or use other types of tobacco near bedtime. Nicotine is a stimulant and can keep you awake. · Avoid drinking alcohol late in the evening, because it can cause you to wake in the middle of the night. · Do not eat a big meal close to bedtime. If you are hungry, eat a light snack. · Do not drink a lot of water close to bedtime, because the need to urinate may wake you up during the night. · Do not read or watch TV in bed. Use the bed only for sleeping and sexual activity. What to try  · Go to bed at the same time every night, and wake up at the same time every morning. Do not take naps during the day. · Keep your bedroom quiet, dark, and cool. · Get regular exercise, but not within 3 to 4 hours of your bedtime. .  · Sleep on a comfortable pillow and mattress.   · If watching the clock makes you anxious, turn it facing away from you so you cannot see the time. · If you worry when you lie down, start a worry book. Well before bedtime, write down your worries, and then set the book and your concerns aside. · Try meditation or other relaxation techniques before you go to bed. · If you cannot fall asleep, get up and go to another room until you feel sleepy. Do something relaxing. Repeat your bedtime routine before you go to bed again. · Make your house quiet and calm about an hour before bedtime. Turn down the lights, turn off the TV, log off the computer, and turn down the volume on music. This can help you relax after a busy day. Drowsy Driving  The [address] [place] [address] Road Traffic Safety Administration cites drowsiness as a causing factor in more than 376,103 police reported crashes annually, resulting in 76,000 injuries and 1,500 deaths. Other surveys suggest 55% of people polled have driven while drowsy in the past year, 23% had fallen asleep but not crashed, 3% crashed, and 2% had and accident due to drowsy driving. Who is at risk? Young Drivers: One study of drowsy driving accidents states that 55% of the drivers were under 25 years. Of those, 75% were male. Shift Workers and Travelers: People who work overnight or travel across time zones frequently are at higher risk of experiencing Circadian Rhythm Disorders. They are trying to work and function when their body is programed to sleep. Sleep Deprived: Lack of sleep has a serious impact on your ability to pay attention or focus on a task. Consistently getting less than the average of 8 hours your body needs creates partial or cumulative sleep deprivation. Untreated Sleep Disorders: Sleep Apnea, Narcolepsy, R.L.S., and other sleep disorders (untreated) prevent a person from getting enough restful sleep. This leads to excessive daytime sleepiness and increases the risk for drowsy driving accidents by up to 7 times.   Medications / Alcohol: Even over the counter medications can cause drowsiness. Medications that impair a drivers attention should have a warning label. Alcohol naturally makes you sleepy and on its own can cause accidents. Combined with excessive drowsiness its effects are amplified. Signs of Drowsy Driving:   * You don't remember driving the last few miles   * You may drift out of your ronn   * You are unable to focus and your thoughts wander   * You may yawn more often than normal   * You have difficulty keeping your eyes open / nodding off   * Missing traffic signs, speeding, or tailgating  Prevention-   Good sleep hygiene, lifestyle and behavioral choices have the most impact on drowsy driving. There is no substitute for sleep and the average person requires 8 hours nightly. If you find yourself driving drowsy, stop and sleep. Consider the sleep hygiene tips provided during your visit as well. Medication Refill Policy: Refills for all medications require 1 week advance notice. Please have your pharmacy fax a refill request. We are unable to fax, or call in \"controled substance\" medications and you will need to pick these prescriptions up from our office. [employer] Activation    Thank you for requesting access to [employer]. Please follow the instructions below to securely access and download your online medical record. [employer] allows you to send messages to your doctor, view your test results, renew your prescriptions, schedule appointments, and more. How Do I Sign Up? 1. In your internet browser, go to [email or web address] 2. Click on the First Time User? Click Here link in the Sign In box. You will see the New Member Sign Up page. 3. Enter your [employer] Access Code exactly as it appears below. You will not need to use this code after youve completed the sign-up process. If you do not sign up before the expiration date, you must request a new code.     [employer] Access Code: [email or web address]  Expires: 9/12/2017 10:31 AM (This is the date your [employer] access code will )    4. Enter the last four digits of your Social Security Number (xxxx) and Date of Birth (mm/dd/yyyy) as indicated and click Submit. You will be taken to the next sign-up page. 5. Create a [employer] ID. This will be your [employer] login ID and cannot be changed, so think of one that is secure and easy to remember. 6. Create a [employer] password. You can change your password at any time. 7. Enter your Password Reset Question and Answer. This can be used at a later time if you forget your password. 8. Enter your e-mail address. You will receive e-mail notification when new information is available in [address] 9. Click Sign Up. You can now view and download portions of your medical record. 10. Click the Download Summary menu link to download a portable copy of your medical information. Additional Information    If you have questions, please call [phone]. Remember, [employer] is NOT to be used for urgent needs. For medical emergencies, dial 911. Starting a Weight Loss Plan: After Your Visit  Your Care Instructions  If you are thinking about losing weight, it can be hard to know where to start. Your doctor can help you set up a weight loss plan that best meets your needs. You may want to take a class on nutrition or exercise, or join a weight loss support group. If you have questions about how to make changes to your eating or exercise habits, ask your doctor about seeing a registered dietitian or an exercise specialist.  It can be a big challenge to lose weight. But you do not have to make huge changes at once. Make small changes, and stick with them. When those changes become habit, add a few more changes. If you do not think you are ready to make changes right now, try to pick a date in the future. Make an appointment to see your doctor to discuss whether the time is right for you to start a plan. Follow-up care is a key part of your treatment and safety.  Be sure to make and go to all appointments, and call your doctor if you are having problems. It's also a good idea to know your test results and keep a list of the medicines you take. How can you care for yourself at home? · Set realistic goals. Many people expect to lose much more weight than is likely. A weight loss of 5% to 10% of your body weight may be enough to improve your health. · Get family and friends involved to provide support. Talk to them about why you are trying to lose weight, and ask them to help. They can help by participating in exercise and having meals with you, even if they may be eating something different. · Find what works best for you. If you do not have time or do not like to cook, a program that offers meal replacement bars or shakes may be better for you. Or if you like to prepare meals, finding a plan that includes daily menus and recipes may be best.  · Ask your doctor about other health professionals who can help you achieve your weight loss goals. ¨ A dietitian can help you make healthy changes in your diet. ¨ An exercise specialist or  can help you develop a safe and effective exercise program.  ¨ A counselor or psychiatrist can help you cope with issues such as depression, anxiety, or family problems that can make it hard to focus on weight loss. · Consider joining a support group for people who are trying to lose weight. Your doctor can suggest groups in your area. Where can you learn more? Go to [employer]  Enter U357 in the search box to learn more about \"Starting a Weight Loss Plan: After Your Visit. \"   © 2947-6556 Healthwise, Incorporated. Care instructions adapted under license by Nehemias Dennis (which disclaims liability or warranty for this information).  This care instruction is for use with your licensed healthcare professional. If you have questions about a medical condition or this instruction, always ask your healthcare professional. [address] any warranty or liability for your use of this information.   Content Version: 5.6.13477; Last Revised: September 1, 2009

## 2017-06-15 ENCOUNTER — TELEPHONE (OUTPATIENT)
Dept: SLEEP MEDICINE | Age: 67
End: 2017-06-15

## 2017-06-15 NOTE — TELEPHONE ENCOUNTER
[facility]    Date of Study: 6/15/17    The following information was gathered from the patients study log:    · Lights off: 9:33P  · Estimated sleep onset: 9:45P    · Awakened a total of 2 times  · The patient felt they slept 7.5 hours  · Patient took no sleep aid before starting the test  · Sleep quality was better compared to a usual nights sleep.     Further information provided: -

## 2017-06-19 ENCOUNTER — OFFICE VISIT (OUTPATIENT)
Dept: SLEEP MEDICINE | Age: 67
End: 2017-06-19

## 2017-06-19 ENCOUNTER — HOSPITAL ENCOUNTER (OUTPATIENT)
Dept: SLEEP MEDICINE | Age: 67
Discharge: HOME | End: 2017-06-19
Payer: MEDICARE

## 2017-06-19 VITALS
DIASTOLIC BLOOD PRESSURE: 71 MMHG | WEIGHT: 145 LBS | TEMPERATURE: 97.8 F | BODY MASS INDEX: 28.47 KG/M2 | HEIGHT: 60 IN | HEART RATE: 75 BPM | OXYGEN SATURATION: 97 % | SYSTOLIC BLOOD PRESSURE: 113 MMHG

## 2017-06-19 DIAGNOSIS — G47.33 OBSTRUCTIVE SLEEP APNEA: Primary | ICD-10-CM

## 2017-06-19 PROCEDURE — 95806 SLEEP STUDY UNATT&RESP EFFT: CPT

## 2017-06-19 NOTE — PROGRESS NOTES
[address] [facility] [place]. [place], [address]  Tel.  [phone]  Fax. 100 [facility] 60  [place], [address] [facility]  Tel.  [phone]  Fax. [phone] [address] [address]  Tel.  [phone]  Fax. [phone]       S>Maria Esther Hamm is a 79 y.o. female seen today to receive a home sleep testing unit (HST). · Patient was educated on proper hookup and operation of the HST. · Instruction forms and documentation were reviewed and signed. · The patient demonstrated good understanding of the HST. O>    Visit Vitals    /71    Pulse 75    Temp 97.8 °F (36.6 °C)    Ht 5' (1.524 m)    Wt 145 lb (65.8 kg)    LMP 01/01/2004    SpO2 97%    BMI 28.32 kg/m2         A>  No diagnosis found. P>  · General information regarding operations and maintenance of the device was provided. · She was provided information on sleep apnea including coresponding risk factors and the importance of proper treatment. · Follow-up appointment was made to return the HST. She will be contacted once the results have been reviewed. · She was asked to contact our office for any problems regarding her home sleep test study.

## 2017-06-20 ENCOUNTER — TELEPHONE (OUTPATIENT)
Dept: SLEEP MEDICINE | Age: 67
End: 2017-06-20

## 2017-06-20 DIAGNOSIS — G47.33 OSA (OBSTRUCTIVE SLEEP APNEA): Primary | ICD-10-CM

## 2017-06-20 NOTE — TELEPHONE ENCOUNTER
[address] [facility]. [place], [facility]  Tel.  [phone]  Fax. 100 [facility] 60  [place], [address] [facility]  Tel.  [phone]  Fax. [phone] [facility] [facility]  Tel.  [phone]  Fax. [phone]   Polysomnogram was performed and the results of the study were explained to the patient. Please refer to interpretation report for further details. Apnea/Hypopnea index of 15 which indicates significant apnea. She continues to have snoring. * Treatment options were offered. She has elected to proceed with a positive airway pressure trial (CPAP). * We have written her PAP order  * PAP card download in 4 weeks. PAP clinic if adherence remains poor  * Counseling was provided regarding the importance of regular PAP use and on proper sleep hygiene and safe driving. Thank you for allowing to participate in your patient's medical care. We'll keep you updated on these investigations.     Yusuf Louise M.D.  (electronically signed)  Diplomate in Sleep Medicine, [facility]

## 2017-06-20 NOTE — TELEPHONE ENCOUNTER
HSAT Returned-[facility]  Night one  Date of Study: 6/19/17    The following information was gathered from the patients study log:    · Lights off: 9:12PM  · Estimated sleep onset: 9:30PM    · Awakened a total of 3 times  · The patient felt they slept 8 hours  · Patient took none before starting the test  · Sleep quality was same compared to a usual nights sleep. Further information provided: Probably woke up a few more times checking the clock.

## 2017-06-23 ENCOUNTER — TELEPHONE (OUTPATIENT)
Dept: SLEEP MEDICINE | Age: 67
End: 2017-06-23

## 2017-06-23 NOTE — TELEPHONE ENCOUNTER
Order faxed to [facility]. LVM for patient to call back. When she calls back, need to let her know of DME.

## 2017-08-06 DIAGNOSIS — Z91.09 ENVIRONMENTAL ALLERGIES: ICD-10-CM

## 2017-08-07 RX ORDER — FLUTICASONE PROPIONATE 50 MCG
SPRAY, SUSPENSION (ML) NASAL
Qty: 48 G | Refills: 3 | Status: SHIPPED | OUTPATIENT
Start: 2017-08-07 | End: 2019-03-25 | Stop reason: SDUPTHER

## 2017-09-25 ENCOUNTER — OFFICE VISIT (OUTPATIENT)
Dept: SLEEP MEDICINE | Age: 67
End: 2017-09-25

## 2017-09-25 ENCOUNTER — TELEPHONE (OUTPATIENT)
Dept: SLEEP MEDICINE | Age: 67
End: 2017-09-25

## 2017-09-25 VITALS
WEIGHT: 149.2 LBS | DIASTOLIC BLOOD PRESSURE: 72 MMHG | SYSTOLIC BLOOD PRESSURE: 117 MMHG | HEIGHT: 60 IN | BODY MASS INDEX: 29.29 KG/M2 | HEART RATE: 74 BPM | OXYGEN SATURATION: 98 %

## 2017-09-25 DIAGNOSIS — G47.00 INSOMNIA, UNSPECIFIED TYPE: ICD-10-CM

## 2017-09-25 DIAGNOSIS — E66.3 OVERWEIGHT (BMI 25.0-29.9): ICD-10-CM

## 2017-09-25 DIAGNOSIS — G47.33 OSA (OBSTRUCTIVE SLEEP APNEA): Primary | ICD-10-CM

## 2017-09-25 DIAGNOSIS — I10 ESSENTIAL HYPERTENSION: ICD-10-CM

## 2017-09-25 NOTE — PATIENT INSTRUCTIONS
[address] [place]. [place], [address]  Tel.  [phone]  Fax. 100 [facility] 60  [place], [address]  Tel.  [phone]  Fax. [phone] [address] [facility] [address] [place]  Tel.  [phone]  Fax. [phone]       Insomnia: After Your Visit  Your Care Instructions  Insomnia is the inability to sleep well. It is a common problem for most people at some time. Insomnia may make it hard for you to get to sleep, stay asleep, or sleep as long as you need to. This can make you tired and grouchy during the day. It can also make you forgetful, less effective at work, and unhappy. Insomnia can be caused by conditions such as depression or anxiety. Pain can also affect your ability to sleep. When these problems are solved, the insomnia usually clears up. But sometimes bad sleep habits can cause insomnia. If insomnia is affecting your work or your enjoyment of life, you can take steps to improve your sleep. Follow-up care is a key part of your treatment and safety. Be sure to make and go to all appointments, and call your doctor if you are having problems. It's also a good idea to know your test results and keep a list of the medicines you take. How can you care for yourself at home? What to avoid  · Do not have drinks with caffeine, such as coffee or black tea, for 8 hours before bed. · Do not smoke or use other types of tobacco near bedtime. Nicotine is a stimulant and can keep you awake. · Avoid drinking alcohol late in the evening, because it can cause you to wake in the middle of the night. · Do not eat a big meal close to bedtime. If you are hungry, eat a light snack. · Do not drink a lot of water close to bedtime, because the need to urinate may wake you up during the night. · Do not read or watch TV in bed. Use the bed only for sleeping and sexual activity.   What to try  · Go to bed at the same time every night, and wake up at the same time every morning. Do not take naps during the day. · Keep your bedroom quiet, dark, and cool. · Get regular exercise, but not within 3 to 4 hours of your bedtime. .  · Sleep on a comfortable pillow and mattress. · If watching the clock makes you anxious, turn it facing away from you so you cannot see the time. · If you worry when you lie down, start a worry book. Well before bedtime, write down your worries, and then set the book and your concerns aside. · Try meditation or other relaxation techniques before you go to bed. · If you cannot fall asleep, get up and go to another room until you feel sleepy. Do something relaxing. Repeat your bedtime routine before you go to bed again. · Make your house quiet and calm about an hour before bedtime. Turn down the lights, turn off the TV, log off the computer, and turn down the volume on music. This can help you relax after a busy day. When should you call for help? Watch closely for changes in your health, and be sure to contact your doctor if:  · Your efforts to improve your sleep do not work. · Your insomnia gets worse. · You fall asleep during the day. Where can you learn more? Go to [employer]  Enter P513 in the search box to learn more about \"Insomnia: After Your Visit. \"   © 7864-9399 Healthwise, Incorporated. Care instructions adapted under license by Nancy Gray (which disclaims liability or warranty for this information). This care instruction is for use with your licensed healthcare professional. If you have questions about a medical condition or this instruction, always ask your healthcare professional. [place] [address] any warranty or liability for your use of this information. Content Version: 6.9.62455; Last Revised: June 26, 2010    Drowsy Driving:  The [employer] cites drowsiness as a causing factor in more than 724,220 police reported crashes annually, resulting in 76,000 injuries and 1,500 deaths. Other surveys suggest 55% of people polled have driven while drowsy in the past year, 23% had fallen asleep but not crashed, 3% crashed, and 2% had and accident due to drowsy driving. Who is at risk? Young Drivers: One study of drowsy driving accidents states that 55% of the drivers were under 25 years. Of those, 75% were male. Shift Workers and Travelers: People who work overnight or travel across time zones frequently are at higher risk of experiencing Circadian Rhythm Disorders. They are trying to work and function when their body is programed to sleep. Sleep Deprived: Lack of sleep has a serious impact on your ability to pay attention or focus on a task. Consistently getting less than the average of 8 hours your body needs creates partial or cumulative sleep deprivation. Untreated Sleep Disorders: Sleep Apnea, Narcolepsy, R.L.S., and other sleep disorders (untreated) prevent a person from getting enough restful sleep. This leads to excessive daytime sleepiness and increases the risk for drowsy driving accidents by up to 7 times. Medications / Alcohol: Even over the counter medications can cause drowsiness. Medications that impair a drivers attention should have a warning label. Alcohol naturally makes you sleepy and on its own can cause accidents. Combined with excessive drowsiness its effects are amplified. Signs of Drowsy Driving:   * You don't remember driving the last few miles   * You may drift out of your ronn   * You are unable to focus and your thoughts wander   * You may yawn more often than normal   * You have difficulty keeping your eyes open / nodding off   * Missing traffic signs, speeding, or tailgating  Prevention-   Good sleep hygiene, lifestyle and behavioral choices have the most impact on drowsy driving. There is no substitute for sleep and the average person requires 8 hours nightly.  If you find yourself driving drowsy, stop and sleep. Consider the sleep hygiene tips provided during your visit as well. Medication Refill Policy: Refills for all medications require 1 week advance notice. Please have your pharmacy fax a refill request. We are unable to fax, or call in \"controled substance\" medications and you will need to pick these prescriptions up from our office. [employer] Activation    Thank you for requesting access to [employer]. Please follow the instructions below to securely access and download your online medical record. [employer] allows you to send messages to your doctor, view your test results, renew your prescriptions, schedule appointments, and more. How Do I Sign Up? 1. In your internet browser, go to [email or web address]. 2. Click on the First Time User? Click Here link in the Sign In box. You will see the New Member Sign Up page. 3. Enter your [employer] Access Code exactly as it appears below. You will not need to use this code after youve completed the sign-up process. If you do not sign up before the expiration date, you must request a new code. [employer] Access Code: [email or web address]  Expires: 2017 11:27 AM (This is the date your [employer] access code will )    4. Enter the last four digits of your Social Security Number (xxxx) and Date of Birth (mm/dd/yyyy) as indicated and click Submit. You will be taken to the next sign-up page. 5. Create a [employer] ID. This will be your [employer] login ID and cannot be changed, so think of one that is secure and easy to remember. 6. Create a [employer] password. You can change your password at any time. 7. Enter your Password Reset Question and Answer. This can be used at a later time if you forget your password. 8. Enter your e-mail address. You will receive e-mail notification when new information is available in [address] 9. Click Sign Up. You can now view and download portions of your medical record.   10. Click the [employer] link to download a portable copy of your medical information. Additional Information    If you have questions, please call [phone]. Remember, [employer] is NOT to be used for urgent needs. For medical emergencies, dial 911.

## 2017-09-25 NOTE — PROGRESS NOTES
[address] [facility] [place]. [place], [address]  Tel.  [phone]  Fax. 100 [facility] 60  [place], [address]  Tel.  [phone]  Fax. [phone] [address] [place] [address]  Tel.  [phone]  Fax. [phone]     S>Maria Esther Teran is a 79 y.o. female seen for a positive airway pressure follow-up. She reports no problems using the device. The following problems are identified:    Drowsiness no Problems exhaling no   Snoring no Forget to put on no   Mask Comfortable yes Can't fall asleep no   Dry Mouth no Mask falls off no   Air Leaking no Frequent awakenings no         She admits that her sleep has significantly improved. T    Allergies   Allergen Reactions    Penicillin G Hives       She has a current medication list which includes the following prescription(s): fluticasone, lisinopril, raloxifene, biotin, multivitamin, cetirizine hcl, calcium carbonate/vitamin d3, and aspirin delayed-release. .      She  has a past medical history of Bicuspid aortic valve; Hypertension; and Osteoporosis. She also has no past medical history of Unspecified adverse effect of anesthesia or Unspecified sleep apnea. [place] Sleepiness Score: 9   and Modified F.O.S.Q. Score Total / 2: 19   which reflect improved sleep quality over therapy time. O>    Visit Vitals    /72    Pulse 74    Ht 5' (1.524 m)    Wt 149 lb 3.2 oz (67.7 kg)    LMP 01/01/2004    SpO2 98%    BMI 29.14 kg/m2           General:   Alert, oriented, not in distress   Neck:   No JVD    Chest/Lungs:  symetrical lung expansion , no accessory muscle use    Extremities:  no obvious rashes , negative edema    Neuro:  No focal deficits ; No obvious tremor    Psych:  Normal affect ,  Normal countenance ;           A>    ICD-10-CM ICD-9-CM    1. JUSTA (obstructive sleep apnea) G47.33 327.23    2. Essential hypertension I10 401.9    3. Overweight (BMI 25.0-29. 9) E66.3 278.02    4.  Insomnia, unspecified type G47.00 780.52      AHI = 15((6-15). On CPAP :  10 cmH2O. Compliant:      yes    Therapeutic Response:  Positive    P>      * Follow-up Disposition:  Return in about 1 year (around 9/25/2018). she will continue on her current pressure settings. Download reviewed. herapy Apnea Index averaged over PAP use: 2 /hr which reflects improved sleep breathing condition. Turn off check. Tech to call patient today to let her know, that no changes needed (download was not available at time of visit)    * She was asked to contact our office for any problems regarding PAP therapy. * Counseling was provided regarding the importance of regular PAP use and on proper sleep hygiene and safe driving. * Re-enforced proper and regular cleaning for the device. 2. Hypertension - she continues on her current regimen. I have reviewed the relationship between hypertension as it relates to sleep-disordered breathing. 3. Overweight - I have counseled the patient regarding the benefits of weight loss. 4. Insomnia - I have advised a regular sleep wake cycle. she  was advised to avoid looking at the clock during the night. Ideally, the clock face should be turned away. she was advised to minimize caffeine use and to avoid caffeine-containing beverages 8 hours prior to bedtime. Naps were discouraged. A regular exercise schedule, at least 3 hours before bedtime, would be beneficial to improving sleep quality. she was advised to avoid evening light and to use sunglasses in the late afternoon. Watching TV, using laptops, tablets and smartphones in the evening was discouraged. she  was advised to keep the bedroom cool and dark. Relaxation strategies reviewed. All of her questions were addressed.     Delaney Francis MD  Diplomate in Sleep Medicine  [facility]

## 2018-01-05 ENCOUNTER — DOCUMENTATION ONLY (OUTPATIENT)
Dept: FAMILY MEDICINE CLINIC | Age: 68
End: 2018-01-05

## 2018-01-05 DIAGNOSIS — Z78.0 POSTMENOPAUSAL: Primary | ICD-10-CM

## 2018-01-05 NOTE — PROGRESS NOTES
Order placed for Dexa Scan, per Verbal Order from Dr. Augustus Roblero on 1/5/2018 due to Post Menopausal Syndrome.

## 2018-01-25 ENCOUNTER — HOSPITAL ENCOUNTER (OUTPATIENT)
Dept: MAMMOGRAPHY | Age: 68
Discharge: HOME | End: 2018-01-25
Attending: FAMILY MEDICINE
Payer: MEDICARE

## 2018-01-25 DIAGNOSIS — Z78.0 POSTMENOPAUSAL: ICD-10-CM

## 2018-01-25 PROCEDURE — 77080 DXA BONE DENSITY AXIAL: CPT

## 2018-02-28 ENCOUNTER — OFFICE VISIT (OUTPATIENT)
Dept: FAMILY MEDICINE CLINIC | Age: 68
End: 2018-02-28

## 2018-02-28 ENCOUNTER — HOSPITAL ENCOUNTER (OUTPATIENT)
Dept: LAB | Age: 68
Discharge: HOME | End: 2018-02-28
Payer: MEDICARE

## 2018-02-28 VITALS
DIASTOLIC BLOOD PRESSURE: 76 MMHG | TEMPERATURE: 96.8 F | WEIGHT: 152 LBS | RESPIRATION RATE: 16 BRPM | HEIGHT: 61 IN | BODY MASS INDEX: 28.7 KG/M2 | SYSTOLIC BLOOD PRESSURE: 138 MMHG | OXYGEN SATURATION: 98 % | HEART RATE: 72 BPM

## 2018-02-28 DIAGNOSIS — Z23 NEED FOR SHINGLES VACCINE: ICD-10-CM

## 2018-02-28 DIAGNOSIS — Z00.00 MEDICARE ANNUAL WELLNESS VISIT, SUBSEQUENT: Primary | ICD-10-CM

## 2018-02-28 DIAGNOSIS — Z51.81 ENCOUNTER FOR MEDICATION MONITORING: ICD-10-CM

## 2018-02-28 DIAGNOSIS — Z86.39 H/O: HYPOTHYROIDISM: ICD-10-CM

## 2018-02-28 DIAGNOSIS — I10 ESSENTIAL HYPERTENSION: ICD-10-CM

## 2018-02-28 DIAGNOSIS — Z23 ENCOUNTER FOR IMMUNIZATION: ICD-10-CM

## 2018-02-28 DIAGNOSIS — R19.5 HEME POSITIVE STOOL: ICD-10-CM

## 2018-02-28 LAB
BILIRUB UR QL STRIP: NEGATIVE
GLUCOSE UR-MCNC: NEGATIVE MG/DL
KETONES P FAST UR STRIP-MCNC: NEGATIVE MG/DL
PH UR STRIP: 5.5 [PH] (ref 4.6–8)
PROT UR QL STRIP: NEGATIVE
SP GR UR STRIP: 1 (ref 1–1.03)
UA UROBILINOGEN AMB POC: NORMAL (ref 0.2–1)
URINALYSIS CLARITY POC: CLEAR
URINALYSIS COLOR POC: YELLOW
URINE BLOOD POC: NEGATIVE
URINE LEUKOCYTES POC: NORMAL
URINE NITRITES POC: NEGATIVE

## 2018-02-28 PROCEDURE — 36415 COLL VENOUS BLD VENIPUNCTURE: CPT

## 2018-02-28 PROCEDURE — 85027 COMPLETE CBC AUTOMATED: CPT

## 2018-02-28 PROCEDURE — 80053 COMPREHEN METABOLIC PANEL: CPT

## 2018-02-28 PROCEDURE — 84443 ASSAY THYROID STIM HORMONE: CPT

## 2018-02-28 PROCEDURE — 80061 LIPID PANEL: CPT

## 2018-02-28 NOTE — PROGRESS NOTES
Chief Complaint   Patient presents with   [facility] Annual Wellness Visit         Verbal order received per Dr. Almendarez- obtain UA without micro- VORB

## 2018-02-28 NOTE — PROGRESS NOTES
This is a Subsequent Medicare Annual Wellness Visit providing Personalized Prevention Plan Services (PPPS) (Performed 12 months after initial AWV and PPPS )    I have reviewed the patient's medical history in detail and updated the computerized patient record. BP follow up:  Compliant w/ medication, low salt diet, and exercise. BP home monitoring 130s/70s. Home bp monitoring has been elevated but improved since starting on the medication. No swelling, headache or dizziness. No chest pain, SOB, palpitations. Thyroid Review:  H/o hypothyroidism. Currently not on medication. Thyroid ROS: denies fatigue, weight changes, heat/cold intolerance, bowel/skin changes or CVS symptoms. History     Past Medical History:   Diagnosis Date    Bicuspid aortic valve     Hypertension     Obstructive sleep apnea on CPAP 2017    Osteoporosis       Past Surgical History:   Procedure Laterality Date    ENDOSCOPY, COLON, DIAGNOSTIC      HX GYN      left ovarian cyst removed    HX ORTHOPAEDIC      cyst left hand    HX ORTHOPAEDIC Bilateral     torn meniscus    HX ORTHOPAEDIC      bakers cyst    HX TONSILLECTOMY       Current Outpatient Prescriptions   Medication Sig Dispense Refill    varicella-zoster recombinant, PF, (SHINGRIX) 50 mcg/0.5 mL susr injection 0.5 mL by IntraMUSCular route once for 1 dose. Repeat second dose in 6 months. 0.5 mL 1    fluticasone (FLONASE) 50 mcg/actuation nasal spray USE 2 SPRAYS IN EACH NOSTRIL DAILY AS NEEDED FOR RHINITIS 48 g 3    lisinopril (PRINIVIL, ZESTRIL) 5 mg tablet Take 5 mg by mouth every evening.  raloxifene (EVISTA) 60 mg tablet Take 60 mg by mouth daily.  BIOTIN PO Take 1 tablet by mouth daily.  multivitamin (ONE A DAY) tablet Take 1 tablet by mouth daily.  CETIRIZINE HCL (ZYRTEC PO) Take 1 Tab by mouth daily.  CALCIUM CARBONATE/VITAMIN D3 (CALCIUM + D PO) Take 1 tablet by mouth two (2) times a day.       aspirin delayed-release (ECOTRIN) 325 mg tablet Take 325 mg by mouth daily. Allergies   Allergen Reactions    Penicillin G Hives     Family History   Problem Relation Age of Onset    Heart Disease Mother     Cancer Mother 80     colorectal cancer    Cancer Father      neck    Cancer Maternal Grandmother      ?colon cancer    Cancer Paternal Grandmother      ?colon cancer    Cancer Other      breast - cousins     Social History   Substance Use Topics    Smoking status: Never Smoker    Smokeless tobacco: Never Used    Alcohol use 0.0 oz/week     0 Standard drinks or equivalent per week      Comment: occasionally - very little     Patient Active Problem List   Diagnosis Code    Osteoporosis M81.0    Bicuspid aortic valve Q23.1    H/O: hypothyroidism Z86.39    Essential hypertension I10    Encounter for medication monitoring Z51.81       Depression Risk Factor Screening:     PHQ over the last two weeks 2/28/2018   Little interest or pleasure in doing things Not at all   Feeling down, depressed or hopeless Not at all   Total Score PHQ 2 0     Alcohol Risk Factor Screening: On any occasion during the past 3 months, have you had more than 3 drinks containing alcohol? No    Do you average more than 7 drinks per week? No      Functional Ability and Level of Safety:     Hearing Loss   normal-to-mild    Activities of Daily Living   Self-care. Requires assistance with: no ADLs    Fall Risk     Fall Risk Assessment, last 12 mths 2/28/2018   Able to walk? Yes   Fall in past 12 months?  No     Abuse Screen   Patient is not abused    Review of Systems   Constitutional: negative for fatigue and malaise  Eyes: negative for irritation and redness  Ears, nose, mouth, throat, and face: negative for earaches, nasal congestion and hoarseness  Respiratory: negative for cough, sputum or dyspnea on exertion  Cardiovascular: negative for chest pain, chest pressure/discomfort, dyspnea, palpitations, irregular heart beats, fatigue, lower extremity edema  Gastrointestinal: negative for dysphagia, dyspepsia, reflux symptoms, nausea, vomiting, change in bowel habits, melena, constipation and abdominal pain  Genitourinary:negative for frequency, dysuria, nocturia, urinary incontinence. Integument/breast: negative for rash and dryness  Hematologic/lymphatic: negative for easy bruising and lymphadenopathy  Musculoskeletal:negative for myalgias, arthralgias, stiff joints, neck pain, back pain and muscle weakness  Neurological: negative for headaches, dizziness, tremor and weakness  Endocrine: negative for diabetic symptoms including polyuria and polydipsia        Physical Examination     Evaluation of Cognitive Function:  Mood/affect:  happy  Appearance: age appropriate  Family member/caregiver input: NA    Visit Vitals    /76 (BP 1 Location: Left arm, BP Patient Position: Sitting)    Pulse 72    Temp 96.8 °F (36 °C) (Oral)    Resp 16    Ht 5' 1\" (1.549 m)    Wt 152 lb (68.9 kg)    LMP 01/01/2004    SpO2 98%    BMI 28.72 kg/m2     General:  Alert, cooperative, no distress, appears stated age. Head:  Normocephalic, without obvious abnormality, atraumatic. Ears:  Normal TMs and external ear canals both ears. Nose: Nares normal. Septum midline. Mucosa normal. No drainage or sinus tenderness. Throat: Lips, mucosa, and tongue normal. Teeth and gums normal.   Neck: Supple, symmetrical, trachea midline, no adenopathy, thyroid: no enlargement/tenderness/nodules, no carotid bruit and no JVD. Back:   Symmetric, no curvature. ROM normal. No CVA tenderness. Lungs:   Clear to auscultation bilaterally. Chest wall:  No tenderness or deformity. Heart:  Regular rate and rhythm, S1, S2 normal, no murmur, click, rub or gallop. Breast Exam:  No tenderness, masses, or nipple abnormality. Abdomen:   Soft, non-tender. Bowel sounds normal. No masses,  No organomegaly. Genitalia:  Deferred    Rectal:  Trace heme positive.      Extremities: Extremities normal, atraumatic, no cyanosis or edema. Pulses: 2+ and symmetric all extremities. Skin: Skin color, texture, turgor normal. No rashes or lesions. Lymph nodes: Cervical, supraclavicular, and axillary nodes normal.   Neurologic: CNII-XII intact. Normal strength, sensation and reflexes throughout. Patient Care Team:  Garcia James MD as PCP - General  Oli [place] MD as Physician (Sleep Medicine)    Advice/Referrals/Counseling   Education and counseling provided:  Are appropriate based on today's review and evaluation  End-of-Life planning (with patient's consent)    Assessment/Plan   Daron Novoa was seen today for annual wellness visit. ASSESSMENT and PLAN  Diagnoses and all orders for this visit:    1. Medicare annual wellness visit, subsequent  -     AMB POC URINALYSIS DIP STICK AUTO W/O MICRO    2. Essential hypertension  Discussed sodium restriction, high k rich diet, maintaining ideal body weight and regular exercise program such as daily walking 30 min perday 4-5 times per week, as physiologic means to achieve blood pressure control.  Medication compliance advised. -     LIPID PANEL    3. H/O: hypothyroidism  -     TSH 3RD GENERATION    4. Encounter for medication monitoring  -     METABOLIC PANEL, COMPREHENSIVE  -     CBC W/O DIFF    5. Need for shingles vaccine  -     varicella-zoster recombinant, PF, (SHINGRIX) 50 mcg/0.5 mL susr injection; 0.5 mL by IntraMUSCular route once for 1 dose. Repeat second dose in 6 months. 6. Encounter for immunization  -     Pneumococcal Conjugate vaccine - 13 valent (50 years and older)  -     ADMIN PNEUMOCOCCAL VACCINE  Medicare Injection Admin Charge  -     Administration fee () for Medicare insured patients    7.  Heme positive stool  -     REFERRAL TO GASTROENTEROLOGY      Follow-up Disposition: 6 months  reviewed diet, exercise and weight control  cardiovascular risk and specific lipid/LDL goals reviewed  reviewed medications and side effects in detail    I have discussed diagnosis listed in this note with pt and/or family. I have discussed treatment plans and options and the risk/benefit analysis of those options, including safe use of medications and possible medication side effects. Through the use of shared decision making we have agreed to the above plan. The patient has received an after-visit summary and questions were answered concerning future plans and follow up. Advise pt of any urgent changes then to proceed to the ER.

## 2018-02-28 NOTE — MR AVS SNAPSHOT
[address] [facility] 
 
 
 [address] [facility] [address] [id number] [phone] Patient: Gianluca Craft MRN: [id number] EPU:[id number] Visit Information Date & Time Provider Department Dept. Phone Encounter #  
 2/28/2018  9:15 AM Virginia Ruth MD [address] [facility] [phone] [id number] Upcoming Health Maintenance Date Due  
 GLAUCOMA SCREENING Q2Y 3/4/2015 Pneumococcal 65+ Low/Medium Risk (2 of 2 - PCV13) 1/26/2017 MEDICARE YEARLY EXAM 2/23/2018 COLONOSCOPY 11/4/2019 BREAST CANCER SCRN MAMMOGRAM 1/19/2020 DTaP/Tdap/Td series (2 - Td) 1/7/2024 Allergies as of 2/28/2018  Review Complete On: 2/28/2018 By: Jodi Cherry LPN Severity Noted Reaction Type Reactions Penicillin G Low 09/02/2011    Hives Current Immunizations  Reviewed on 2/28/2018 Name Date Influenza High Dose Vaccine PF 10/20/2016, 12/14/2015 Influenza Vaccine 1/26/2018, 12/18/2012 Influenza Vaccine (Quad) PF 1/28/2015 Influenza Vaccine PF 1/7/2014 Pneumococcal Polysaccharide (PPSV-23) 1/26/2016 Tdap 1/7/2014 Reviewed by Jodi Cherry LPN on 1/18/9621 at  9:31 AM  
 Reviewed by Virginia Ruth MD on 2/28/2018 at  9:52 AM  
You Were Diagnosed With   
  
 Codes Comments Medicare annual wellness visit, subsequent    -  Primary ICD-10-CM: Z00.00 ICD-9-CM: V70.0 Essential hypertension     ICD-10-CM: I10 
ICD-9-CM: 401.9 H/O: hypothyroidism     ICD-10-CM: Z86.39 
ICD-9-CM: V12.29 Encounter for medication monitoring     ICD-10-CM: Z51.81 
ICD-9-CM: V58.83 Need for shingles vaccine     ICD-10-CM: J78 ICD-9-CM: V04.89 Vitals BP Pulse Temp Resp Height(growth percentile) Weight(growth percentile) 138/76 (BP 1 Location: Left arm, BP Patient Position: Sitting) 72 96.8 °F (36 °C) (Oral) 16 5' 1\" (1.549 m) 152 lb (68.9 kg) LMP SpO2 BMI OB Status Smoking Status 2004 98% 28.72 kg/m2 Postmenopausal Never Smoker Vitals History BMI and BSA Data Body Mass Index Body Surface Area 28.72 kg/m 2 1.72 m 2 Preferred Pharmacy Pharmacy Name Phone [facility] [phone] Your Updated Medication List  
  
   
This list is accurate as of 18 10:32 AM.  Always use your most recent med list.  
  
  
  
  
 BIOTIN PO Take 1 tablet by mouth daily. CALCIUM + D PO Take 1 tablet by mouth two (2) times a day. ECOTRIN 325 mg tablet Generic drug:  aspirin delayed-release Take 325 mg by mouth daily. fluticasone 50 mcg/actuation nasal spray Commonly known as:  FLONASE  
USE 2 SPRAYS IN EACH NOSTRIL DAILY AS NEEDED FOR RHINITIS  
  
 lisinopril 5 mg tablet Commonly known as:  Dawna Warren Take 5 mg by mouth every evening.  
  
 multivitamin tablet Commonly known as:  ONE A DAY Take 1 tablet by mouth daily. raloxifene 60 mg tablet Commonly known as:  EVISTA Take 60 mg by mouth daily. varicella-zoster recombinant (PF) 50 mcg/0.5 mL Susr injection Commonly known as:  SHINGRIX  
0.5 mL by IntraMUSCular route once for 1 dose. Repeat second dose in 6 months. ZYRTEC PO Take 1 Tab by mouth daily. Prescriptions Printed Refills  
 varicella-zoster recombinant, PF, (SHINGRIX) 50 mcg/0.5 mL susr injection 1 Si.5 mL by IntraMUSCular route once for 1 dose. Repeat second dose in 6 months. Class: Print Route: IntraMUSCular We Performed the Following AMB POC URINALYSIS DIP STICK AUTO W/O MICRO [52389 CPT(R)] CBC W/O DIFF [48461 CPT(R)] LIPID PANEL [38530 CPT(R)] METABOLIC PANEL, COMPREHENSIVE [15753 CPT(R)] TSH 3RD GENERATION [79436 CPT(R)] Introducing [facility]!    
 [facility] introduces [employer] patient portal. Now you can access parts of your medical record, email your doctor's office, and request medication refills online. 1. In your internet browser, go to [email or web address] 2. Click on the First Time User? Click Here link in the Sign In box. You will see the New Member Sign Up page. 3. Enter your Waffle Access Code exactly as it appears below. You will not need to use this code after youve completed the sign-up process. If you do not sign up before the expiration date, you must request a new code. · Waffle Access Code: [email or web address] Expires: 4/4/2018 10:44 AM 
 
4. Enter the last four digits of your Social Security Number (xxxx) and Date of Birth (mm/dd/yyyy) as indicated and click Submit. You will be taken to the next sign-up page. 5. Create a Waffle ID. This will be your Waffle login ID and cannot be changed, so think of one that is secure and easy to remember. 6. Create a Waffle password. You can change your password at any time. 7. Enter your Password Reset Question and Answer. This can be used at a later time if you forget your password. 8. Enter your e-mail address. You will receive e-mail notification when new information is available in [address] 9. Click Sign Up. You can now view and download portions of your medical record. 10. Click the Download Summary menu link to download a portable copy of your medical information. If you have questions, please visit the Frequently Asked Questions section of the Waffle website. Remember, Waffle is NOT to be used for urgent needs. For medical emergencies, dial 911. Now available from your iPhone and Android! Please provide this summary of care documentation to your next provider. Your primary care clinician is listed as Cydney Lin. If you have any questions after today's visit, please call [phone].

## 2018-02-28 NOTE — PATIENT INSTRUCTIONS
Vaccine Information Statement     Pneumococcal Conjugate Vaccine (PCV13): What You Need to Know    Many Vaccine Information Statements are available in Icelandic and other languages. See www.immunize.org/vis. Hojas de información Sobre Vacunas están disponibles en español y en muchos otros idiomas. Visite www.immunize.org/vis. 1. Why get vaccinated? Vaccination can protect both children and adults from pneumococcal disease. Pneumococcal disease is caused by bacteria that can spread from person to person through close contact. It can cause ear infections, and it can also lead to more serious infections of the:   Lungs (pneumonia),   Blood (bacteremia), and   Covering of the brain and spinal cord (meningitis). Pneumococcal pneumonia is most common among adults. Pneumococcal meningitis can cause deafness and brain damage, and it kills about 1 child in 10 who get it. Anyone can get pneumococcal disease, but children under 3years of age and adults 72 years and older, people with certain medical conditions, and cigarette smokers are at the highest risk. Before there was a vaccine, the [facility] saw:   more than 700 cases of meningitis,   about 13,000 blood infections,   about 5 million ear infections, and   about 200 deaths  in children under 5 each year from pneumococcal disease. Since vaccine became available, severe pneumococcal disease in these children has fallen by 88%. About 18,000 older adults die of pneumococcal disease each year in the [place]. Treatment of pneumococcal infections with penicillin and other drugs is not as effective as it used to be, because some strains of the disease have become resistant to these drugs. This makes prevention of the disease, through vaccination, even more important. 2. PCV13 vaccine    Pneumococcal conjugate vaccine (called PCV13) protects against 13 types of pneumococcal bacteria.       PCV13 is routinely given to children at 2, 4, 6, and 1515 months of age. It is also recommended for children and adults 3to 59years of age with certain health conditions, and for all adults 72years of age and older. Your doctor can give you details. 3. Some people should not get this vaccine    Anyone who has ever had a life-threatening allergic reaction to a dose of this vaccine, to an earlier pneumococcal vaccine called PCV7, or to any vaccine containing diphtheria toxoid (for example, DTaP), should not get PCV13. Anyone with a severe allergy to any component of PCV13 should not get the vaccine. Tell your doctor if the person being vaccinated has any severe allergies. If the person scheduled for vaccination is not feeling well, your healthcare provider might decide to reschedule the shot on another day. 4. Risks of a vaccine reaction    With any medicine, including vaccines, there is a chance of reactions. These are usually mild and go away on their own, but serious reactions are also possible. Problems reported following PCV13 varied by age and dose in the series. The most common problems reported among children were:    About half became drowsy after the shot, had a temporary loss of appetite, or had redness or tenderness where the shot was given.  About 1 out of 3 had swelling where the shot was given.  About 1 out of 3 had a mild fever, and about 1 in 20 had a fever over 102.2°F.   Up to about 8 out of 10 became fussy or irritable. Adults have reported pain, redness, and swelling where the shot was given; also mild fever, fatigue, headache, chills, or muscle pain. Della Whalen children who get PCV13 along with inactivated flu vaccine at the same time may be at increased risk for seizures caused by fever. Ask your doctor for more information. Problems that could happen after any vaccine:     People sometimes faint after a medical procedure, including vaccination.  Sitting or lying down for about 15 minutes can help prevent fainting, and injuries caused by a fall. Tell your doctor if you feel dizzy, or have vision changes or ringing in the ears.  Some older children and adults get severe pain in the shoulder and have difficulty moving the arm where a shot was given. This happens very rarely.  Any medication can cause a severe allergic reaction. Such reactions from a vaccine are very rare, estimated at about 1 in a million doses, and would happen within a few minutes to a few hours after the vaccination. As with any medicine, there is a very small chance of a vaccine causing a serious injury or death. The safety of vaccines is always being monitored. For more information, visit: www.cdc.gov/vaccinesafety/     5. What if there is a serious reaction? What should I look for?  Look for anything that concerns you, such as signs of a severe allergic reaction, very high fever, or unusual behavior. Signs of a severe allergic reaction can include hives, swelling of the face and throat, difficulty breathing, a fast heartbeat, dizziness, and weakness - usually within a few minutes to a few hours after the vaccination. What should I do?  If you think it is a severe allergic reaction or other emergency that cant wait, call 9-1-1 or get the person to the nearest hospital. Otherwise, call your doctor. Reactions should be reported to the Vaccine Adverse Event Reporting System (VAERS). Your doctor should file this report, or you can do it yourself through the VAERS web site at www.vaers. hhs.gov, or by calling 1-715.146.5028. VAERS does not give medical advice. 6. The National Vaccine Injury Compensation Program    The [facility] Vaccine Injury Compensation Program (VICP) is a federal program that was created to compensate people who may have been injured by certain vaccines.     Persons who believe they may have been injured by a vaccine can learn about the program and about filing a claim by calling 1-749.104.2605 or visiting the [facility] website at www.[facility].gov/vaccinecompensation. There is a time limit to file a claim for compensation. 7. How can I learn more?  Ask your healthcare provider. He or she can give you the vaccine package insert or suggest other sources of information.  Call your local or state health department.  Contact the Centers for Disease Control and Prevention (CDC):  - Call 1-416.353.3732 (9-387-RYL-INFO) or  - Visit CDCs website at www.cdc.gov/vaccines    Vaccine Information Statement   PCV13 Vaccine   11/5/2015   42 QUEENIE Bach [id number]    Department of Health and Human Services  Centers for Disease Control and Prevention    Office Use Only

## 2018-02-28 NOTE — PROGRESS NOTES
Chief Complaint   Patient presents with   [facility] Annual Wellness Visit     1. Have you been to the ER, urgent care clinic since your last visit? Hospitalized since your last visit? No    2. Have you seen or consulted any other health care providers outside of the [address] [place] [address] since your last visit? Include any pap smears or colon screening. No    Pt will schedule eye exam.     Pt c/o left foot pain. Eduardo Wick is a 79 y.o. female who presents for routine immunizations. She denies any symptoms , reactions or allergies that would exclude them from being immunized today. Risks and adverse reactions were discussed and the VIS was given to them. All questions were addressed. She was observed for 15 min post injection. There were no reactions observed.     Tejas Trammell LPN

## 2018-03-01 LAB
ALBUMIN SERPL-MCNC: 4.6 G/DL (ref 3.6–4.8)
ALBUMIN/GLOB SERPL: 1.8 {RATIO} (ref 1.2–2.2)
ALP SERPL-CCNC: 53 IU/L (ref 39–117)
ALT SERPL-CCNC: 16 IU/L (ref 0–32)
AST SERPL-CCNC: 19 IU/L (ref 0–40)
BILIRUB SERPL-MCNC: 0.4 MG/DL (ref 0–1.2)
BUN SERPL-MCNC: 19 MG/DL (ref 8–27)
BUN/CREAT SERPL: 21 (ref 12–28)
CALCIUM SERPL-MCNC: 10.7 MG/DL (ref 8.7–10.3)
CHLORIDE SERPL-SCNC: 99 MMOL/L (ref 96–106)
CHOLEST SERPL-MCNC: 221 MG/DL (ref 100–199)
CO2 SERPL-SCNC: 26 MMOL/L (ref 18–29)
CREAT SERPL-MCNC: 0.92 MG/DL (ref 0.57–1)
ERYTHROCYTE [DISTWIDTH] IN BLOOD BY AUTOMATED COUNT: 14.5 % (ref 12.3–15.4)
GFR SERPLBLD CREATININE-BSD FMLA CKD-EPI: 65 ML/MIN/1.73
GFR SERPLBLD CREATININE-BSD FMLA CKD-EPI: 75 ML/MIN/1.73
GLOBULIN SER CALC-MCNC: 2.6 G/DL (ref 1.5–4.5)
GLUCOSE SERPL-MCNC: 98 MG/DL (ref 65–99)
HCT VFR BLD AUTO: 37.8 % (ref 34–46.6)
HDLC SERPL-MCNC: 81 MG/DL
HGB BLD-MCNC: 12.9 G/DL (ref 11.1–15.9)
INTERPRETATION, 910389: NORMAL
LDLC SERPL CALC-MCNC: 124 MG/DL (ref 0–99)
MCH RBC QN AUTO: 31 PG (ref 26.6–33)
MCHC RBC AUTO-ENTMCNC: 34.1 G/DL (ref 31.5–35.7)
MCV RBC AUTO: 91 FL (ref 79–97)
PLATELET # BLD AUTO: 215 X10E3/UL (ref 150–379)
POTASSIUM SERPL-SCNC: 4.6 MMOL/L (ref 3.5–5.2)
PROT SERPL-MCNC: 7.2 G/DL (ref 6–8.5)
RBC # BLD AUTO: 4.16 X10E6/UL (ref 3.77–5.28)
SODIUM SERPL-SCNC: 141 MMOL/L (ref 134–144)
TRIGL SERPL-MCNC: 80 MG/DL (ref 0–149)
TSH SERPL DL<=0.005 MIU/L-ACNC: 1.89 UIU/ML (ref 0.45–4.5)
VLDLC SERPL CALC-MCNC: 16 MG/DL (ref 5–40)
WBC # BLD AUTO: 7.2 X10E3/UL (ref 3.4–10.8)

## 2018-05-07 ENCOUNTER — HOSPITAL ENCOUNTER (OUTPATIENT)
Age: 68
Setting detail: OUTPATIENT SURGERY
Discharge: HOME | End: 2018-05-07
Attending: INTERNAL MEDICINE | Admitting: INTERNAL MEDICINE
Payer: MEDICARE

## 2018-05-07 ENCOUNTER — ANESTHESIA (OUTPATIENT)
Dept: ENDOSCOPY | Age: 68
End: 2018-05-07
Payer: MEDICARE

## 2018-05-07 ENCOUNTER — ANESTHESIA EVENT (OUTPATIENT)
Dept: ENDOSCOPY | Age: 68
End: 2018-05-07
Payer: MEDICARE

## 2018-05-07 VITALS
OXYGEN SATURATION: 100 % | DIASTOLIC BLOOD PRESSURE: 58 MMHG | RESPIRATION RATE: 17 BRPM | HEART RATE: 67 BPM | SYSTOLIC BLOOD PRESSURE: 113 MMHG | TEMPERATURE: 97.5 F

## 2018-05-07 PROCEDURE — 76040000019: Performed by: INTERNAL MEDICINE

## 2018-05-07 PROCEDURE — 74011000250 HC RX REV CODE- 250

## 2018-05-07 PROCEDURE — 76060000031 HC ANESTHESIA FIRST 0.5 HR: Performed by: INTERNAL MEDICINE

## 2018-05-07 PROCEDURE — 74011250636 HC RX REV CODE- 250/636

## 2018-05-07 PROCEDURE — 77030027957 HC TBNG IRR ENDOGTR BUSS -B: Performed by: INTERNAL MEDICINE

## 2018-05-07 RX ORDER — FLUMAZENIL 0.1 MG/ML
0.2 INJECTION INTRAVENOUS
Status: DISCONTINUED | OUTPATIENT
Start: 2018-05-07 | End: 2018-05-07 | Stop reason: HOSPADM

## 2018-05-07 RX ORDER — SODIUM CHLORIDE 0.9 % (FLUSH) 0.9 %
5-10 SYRINGE (ML) INJECTION AS NEEDED
Status: DISCONTINUED | OUTPATIENT
Start: 2018-05-07 | End: 2018-05-07 | Stop reason: HOSPADM

## 2018-05-07 RX ORDER — MIDAZOLAM HYDROCHLORIDE 1 MG/ML
.25-1 INJECTION, SOLUTION INTRAMUSCULAR; INTRAVENOUS
Status: DISCONTINUED | OUTPATIENT
Start: 2018-05-07 | End: 2018-05-07 | Stop reason: HOSPADM

## 2018-05-07 RX ORDER — FENTANYL CITRATE 50 UG/ML
100 INJECTION, SOLUTION INTRAMUSCULAR; INTRAVENOUS
Status: DISCONTINUED | OUTPATIENT
Start: 2018-05-07 | End: 2018-05-07 | Stop reason: HOSPADM

## 2018-05-07 RX ORDER — LIDOCAINE HYDROCHLORIDE 20 MG/ML
INJECTION, SOLUTION EPIDURAL; INFILTRATION; INTRACAUDAL; PERINEURAL AS NEEDED
Status: DISCONTINUED | OUTPATIENT
Start: 2018-05-07 | End: 2018-05-07 | Stop reason: HOSPADM

## 2018-05-07 RX ORDER — ATROPINE SULFATE 0.1 MG/ML
0.5 INJECTION INTRAVENOUS
Status: DISCONTINUED | OUTPATIENT
Start: 2018-05-07 | End: 2018-05-07 | Stop reason: HOSPADM

## 2018-05-07 RX ORDER — DIPHENHYDRAMINE HYDROCHLORIDE 50 MG/ML
50 INJECTION, SOLUTION INTRAMUSCULAR; INTRAVENOUS ONCE
Status: DISCONTINUED | OUTPATIENT
Start: 2018-05-07 | End: 2018-05-07 | Stop reason: HOSPADM

## 2018-05-07 RX ORDER — SODIUM CHLORIDE 9 MG/ML
INJECTION, SOLUTION INTRAVENOUS
Status: DISCONTINUED | OUTPATIENT
Start: 2018-05-07 | End: 2018-05-07 | Stop reason: HOSPADM

## 2018-05-07 RX ORDER — DEXTROMETHORPHAN/PSEUDOEPHED 2.5-7.5/.8
1.2 DROPS ORAL
Status: DISCONTINUED | OUTPATIENT
Start: 2018-05-07 | End: 2018-05-07 | Stop reason: HOSPADM

## 2018-05-07 RX ORDER — PROPOFOL 10 MG/ML
INJECTION, EMULSION INTRAVENOUS AS NEEDED
Status: DISCONTINUED | OUTPATIENT
Start: 2018-05-07 | End: 2018-05-07 | Stop reason: HOSPADM

## 2018-05-07 RX ORDER — EPINEPHRINE 0.1 MG/ML
1 INJECTION INTRACARDIAC; INTRAVENOUS
Status: DISCONTINUED | OUTPATIENT
Start: 2018-05-07 | End: 2018-05-07 | Stop reason: HOSPADM

## 2018-05-07 RX ORDER — SODIUM CHLORIDE 0.9 % (FLUSH) 0.9 %
5-10 SYRINGE (ML) INJECTION EVERY 8 HOURS
Status: DISCONTINUED | OUTPATIENT
Start: 2018-05-07 | End: 2018-05-07 | Stop reason: HOSPADM

## 2018-05-07 RX ORDER — NALOXONE HYDROCHLORIDE 0.4 MG/ML
0.4 INJECTION, SOLUTION INTRAMUSCULAR; INTRAVENOUS; SUBCUTANEOUS
Status: DISCONTINUED | OUTPATIENT
Start: 2018-05-07 | End: 2018-05-07 | Stop reason: HOSPADM

## 2018-05-07 RX ORDER — SODIUM CHLORIDE 9 MG/ML
100 INJECTION, SOLUTION INTRAVENOUS CONTINUOUS
Status: DISCONTINUED | OUTPATIENT
Start: 2018-05-07 | End: 2018-05-07 | Stop reason: HOSPADM

## 2018-05-07 RX ADMIN — PROPOFOL 25 MG: 10 INJECTION, EMULSION INTRAVENOUS at 07:47

## 2018-05-07 RX ADMIN — PROPOFOL 25 MG: 10 INJECTION, EMULSION INTRAVENOUS at 07:46

## 2018-05-07 RX ADMIN — PROPOFOL 25 MG: 10 INJECTION, EMULSION INTRAVENOUS at 07:48

## 2018-05-07 RX ADMIN — PROPOFOL 50 MG: 10 INJECTION, EMULSION INTRAVENOUS at 07:36

## 2018-05-07 RX ADMIN — LIDOCAINE HYDROCHLORIDE 40 MG: 20 INJECTION, SOLUTION EPIDURAL; INFILTRATION; INTRACAUDAL; PERINEURAL at 07:36

## 2018-05-07 RX ADMIN — PROPOFOL 25 MG: 10 INJECTION, EMULSION INTRAVENOUS at 07:40

## 2018-05-07 RX ADMIN — PROPOFOL 25 MG: 10 INJECTION, EMULSION INTRAVENOUS at 07:50

## 2018-05-07 RX ADMIN — PROPOFOL 25 MG: 10 INJECTION, EMULSION INTRAVENOUS at 07:42

## 2018-05-07 RX ADMIN — SODIUM CHLORIDE: 9 INJECTION, SOLUTION INTRAVENOUS at 07:24

## 2018-05-07 RX ADMIN — PROPOFOL 25 MG: 10 INJECTION, EMULSION INTRAVENOUS at 07:37

## 2018-05-07 RX ADMIN — PROPOFOL 25 MG: 10 INJECTION, EMULSION INTRAVENOUS at 07:43

## 2018-05-07 RX ADMIN — PROPOFOL 25 MG: 10 INJECTION, EMULSION INTRAVENOUS at 07:38

## 2018-05-07 RX ADMIN — PROPOFOL 25 MG: 10 INJECTION, EMULSION INTRAVENOUS at 07:44

## 2018-05-07 NOTE — PROCEDURES
[address], [address] [place] [address]        Colonoscopy Operative Report    Georgi Madrid  [id number]  1950      Procedure Type:   Colonoscopy - diagnostic    Indications:    Occult blood in stool         Pre-operative Diagnosis: see indication above    Post-operative Diagnosis:  See findings below    :  Slade Grimm MD      Referring Provider: Macho Christensen MD      Sedation:  MAC anesthesia Propofol      Procedure Details:  After informed consent was obtained with all risks and benefits of procedure explained and preoperative exam completed, the patient was taken to the endoscopy suite and placed in the left lateral decubitus position. Upon sequential sedation as per above, a digital rectal exam was performed demonstrating internal hemorrhoids. The Olympus pediatric videocolonoscope  was inserted in the rectum and carefully advanced to the terminal ileum. The cecum was identified by the ileocecal valve and appendiceal orifice. The quality of preparation was good. The colonoscope was slowly withdrawn with careful evaluation between folds. Retroflexion in the rectum was completed . Findings:   Rectum: small internal hemorrhoids  Sigmoid: moderate diverticulosis  Descending Colon: moderate diverticulosis  Transverse Colon: normal  Ascending Colon: normal  Cecum: normal  Terminal Ileum: normal      Specimen Removed:  none    Complications: None. EBL:  None. Impression:    diverticulosis,  Moderate in degree, involving the descending colon and the sigmoid  hemorrhoids internal, Small in size    Recommendations:  1. Repeat colonoscopy in 5 years  2. High fiber diet education  3. Restart  tomorrow  4. Avoid non-essential NSAID's  5. Consider EGD next if bleeding persists    Signed By: Slade Grimm MD     5/7/2018  7:57 AM

## 2018-05-07 NOTE — ANESTHESIA PREPROCEDURE EVALUATION
Anesthetic History   No history of anesthetic complications            Review of Systems / Medical History  Patient summary reviewed, nursing notes reviewed and pertinent labs reviewed    Pulmonary        Sleep apnea           Neuro/Psych   Within defined limits           Cardiovascular    Hypertension  Valvular problems/murmurs: aortic stenosis            Exercise tolerance: >4 METS     GI/Hepatic/Renal  Within defined limits              Endo/Other      Hypothyroidism       Other Findings            Physical Exam    Airway  Mallampati: II  TM Distance: 4 - 6 cm  Neck ROM: normal range of motion   Mouth opening: Normal     Cardiovascular  Regular rate and rhythm,  S1 and S2 normal,  no murmur, click, rub, or gallop             Dental    Dentition: Caps/crowns     Pulmonary  Breath sounds clear to auscultation               Abdominal  GI exam deferred       Other Findings            Anesthetic Plan    ASA: 3  Anesthesia type: MAC          Induction: Intravenous  Anesthetic plan and risks discussed with: Patient

## 2018-05-07 NOTE — ANESTHESIA POSTPROCEDURE EVALUATION
Post-Anesthesia Evaluation and Assessment    Patient: Roxana Kelsey MRN: [id number]  SSN: [id number]    YOB: 1950  Age: 76 y.o. Sex: female       Cardiovascular Function/Vital Signs  Visit Vitals    /58    Pulse 67    Temp 36.4 °C (97.5 °F)    Resp 17    SpO2 100%       Patient is status post MAC anesthesia for Procedure(s):  COLONOSCOPY. Nausea/Vomiting: None    Postoperative hydration reviewed and adequate. Pain:  Pain Scale 1: Numeric (0 - 10) (05/07/18 0820)  Pain Intensity 1: 0 (05/07/18 0820)   Managed    Neurological Status: At baseline    Mental Status and Level of Consciousness: Arousable    Pulmonary Status:   O2 Device: Room air (05/07/18 0820)   Adequate oxygenation and airway patent    Complications related to anesthesia: None    Post-anesthesia assessment completed.  No concerns    Signed By: Debbie Jaeger MD     May 7, 2018

## 2018-05-07 NOTE — DISCHARGE INSTRUCTIONS
[address]  [id number]  1950    Discomfort:  Redness at IV site- apply warm compress to area; if redness or soreness persist- contact your physician  There may be a slight amount of blood passed from the rectum  Gaseous discomfort- walking, belching will help relieve any discomfort  You may not operate a vehicle for 12 hours  You may not engage in an occupation involving machinery or appliances for rest of today  You may not drink alcoholic beverages for at least 12 hours  Avoid making any critical decisions for at least 24 hour  DIET:  You may resume your regular diet - however -  remember your colon is empty and a heavy meal will produce gas. Avoid these foods:  vegetables, fried / greasy foods, carbonated drinks     ACTIVITY:  You may  resume your normal daily activities it is recommended that you spend the remainder of the day resting -  avoid any strenuous activity. CALL M.D. ANY SIGN OF:   Increasing pain, nausea, vomiting  Abdominal distension (swelling)  New increased bleeding (oral or rectal)  Fever (chills)  Pain in chest area  Bloody discharge from nose or mouth  Shortness of breath      Follow-up Instructions:   Call Dr. Charles Ardon for any questions or problems at [phone]          ENDOSCOPY FINDINGS:   Your colonoscopy showed diverticulosis and hemorrhoids. Please maintain a high fiber diet. Your next colonoscopy will be due in 5 years. You may resume your Aspirin 325 mg tomorrow. Please minimize NSAID's otherwise. Telephone # [phone]      Signed By: Glorianne Livers. Marylee Awkward, MD     5/7/2018  7:56 AM       DISCHARGE SUMMARY from Nurse    The following personal items collected during your admission are returned to you:   Dental Appliance: Dental Appliances: None  Vision: Visual Aid: None  Hearing Aid:    Jewelry:    Clothing:    Other Valuables:    Valuables sent to safe: Diverticulosis: Care Instructions  Your Care Instructions  In diverticulosis, pouches called diverticula form in the wall of the large intestine (colon). The pouches do not cause any pain or other symptoms. Most people who have diverticulosis do not know they have it. But the pouches sometimes bleed, and if they become infected, they can cause pain and other symptoms. When this happens, it is called diverticulitis. Diverticula form when pressure pushes the wall of the colon outward at certain weak points. A diet that is too low in fiber can cause diverticula. Follow-up care is a key part of your treatment and safety. Be sure to make and go to all appointments, and call your doctor if you are having problems. It's also a good idea to know your test results and keep a list of the medicines you take. How can you care for yourself at home? · Include fruits, leafy green vegetables, beans, and whole grains in your diet each day. These foods are high in fiber. · Take a fiber supplement, such as Citrucel or Metamucil, every day if needed. Read and follow all instructions on the label. · Drink plenty of fluids, enough so that your urine is light yellow or clear like water. If you have kidney, heart, or liver disease and have to limit fluids, talk with your doctor before you increase the amount of fluids you drink. · Get at least 30 minutes of exercise on most days of the week. Walking is a good choice. You also may want to do other activities, such as running, swimming, cycling, or playing tennis or team sports. · Cut out foods that cause gas, pain, or other symptoms. When should you call for help? Call your doctor now or seek immediate medical care if:  ? · You have belly pain. ? · You pass maroon or very bloody stools. ? · You have a fever. ? · You have nausea and vomiting. ? · You have unusual changes in your bowel movements or abdominal swelling. ? · You have burning pain when you urinate.    ? · You have abnormal vaginal discharge. ? · You have shoulder pain. ? · You have cramping pain that does not get better when you have a bowel movement or pass gas. ? · You pass gas or stool from your urethra while urinating. ? Watch closely for changes in your health, and be sure to contact your doctor if you have any problems. Where can you learn more? Go to http://august-mariela.info/. Enter A544 in the search box to learn more about \"Diverticulosis: Care Instructions. \"  Current as of: May 12, 2017  Content Version: 11.4  © 4293-1098 [employer]. Care instructions adapted under license by [employer] (which disclaims liability or warranty for this information). If you have questions about a medical condition or this instruction, always ask your healthcare professional. [address] any warranty or liability for your use of this information.

## 2018-05-07 NOTE — H&P
[address]  174 [facility], [address] [place] [address]      History and Physical       NAME:  Raoul Danielson   :   1950   MRN:   [id number]             History of Present Illness:  Patient is a 76 y.o. who is seen for occult positive stool and a family history of colon cancer. She has a personal history of colon polyps in . PMH:  Past Medical History:   Diagnosis Date    Bicuspid aortic valve     Hypertension     Obstructive sleep apnea on CPAP     Osteoporosis     Thyroid disease     hx of synthroid use       PSH:  Past Surgical History:   Procedure Laterality Date    ENDOSCOPY, COLON, DIAGNOSTIC      HX GYN      left ovarian cyst removed    HX KNEE ARTHROSCOPY Bilateral     torn meniscus    HX ORTHOPAEDIC      cyst left hand    HX ORTHOPAEDIC      bakers cyst    HX TONSILLECTOMY         Allergies: Allergies   Allergen Reactions    Penicillin G Hives       Home Medications:  Prior to Admission Medications   Prescriptions Last Dose Informant Patient Reported? Taking? ASPIRIN PO 2018 at Unknown time  Yes Yes   Sig: Take 81 mg by mouth daily. X 7 days. Started 2018   BIOTIN PO 2018 at Unknown time  Yes Yes   Sig: Take 10,000 mcg by mouth daily. CALCIUM CARBONATE/VITAMIN D3 (CALCIUM + D PO) 2018 at Unknown time  Yes Yes   Sig: Take 600 mg by mouth two (2) times a day. CETIRIZINE HCL (ZYRTEC PO) 2018 at Unknown time  Yes Yes   Sig: Take 10 mg by mouth daily. aspirin delayed-release (ECOTRIN) 325 mg tablet Not Taking at Unknown time  Yes No   Sig: Take 325 mg by mouth daily. fluticasone (FLONASE) 50 mcg/actuation nasal spray 2018 at Unknown time  No Yes   Sig: USE 2 SPRAYS IN EACH NOSTRIL DAILY AS NEEDED FOR RHINITIS   folic acid/multivit-min/lutein (CENTRUM SILVER PO) Not Taking at Unknown time  Yes No   Sig: Take  by mouth. Takes one po once daily.    lisinopril (PRINIVIL, ZESTRIL) 5 mg tablet 2018 at Unknown time  Yes Yes   Sig: Take 5 mg by mouth every evening. naproxen sodium (ALEVE PO) Not Taking at Unknown time  Yes No   Sig: Take 2 Tabs by mouth every morning. raloxifene (EVISTA) 60 mg tablet 5/6/2018 at Unknown time  Yes Yes   Sig: Take 60 mg by mouth daily.       Facility-Administered Medications: None       Hospital Medications:  Current Facility-Administered Medications   Medication Dose Route Frequency    0.9% sodium chloride infusion  100 mL/hr IntraVENous CONTINUOUS    sodium chloride (NS) flush 5-10 mL  5-10 mL IntraVENous Q8H    sodium chloride (NS) flush 5-10 mL  5-10 mL IntraVENous PRN    midazolam (VERSED) injection 0.25-10 mg  0.25-10 mg IntraVENous Multiple    fentaNYL citrate (PF) injection 100 mcg  100 mcg IntraVENous Multiple    naloxone (NARCAN) injection 0.4 mg  0.4 mg IntraVENous Multiple    flumazenil (ROMAZICON) 0.1 mg/mL injection 0.2 mg  0.2 mg IntraVENous Multiple    simethicone (MYLICON) 24IP/2.4LK oral drops 80 mg  1.2 mL Oral Multiple    diphenhydrAMINE (BENADRYL) injection 50 mg  50 mg IntraVENous ONCE    atropine injection 0.5 mg  0.5 mg IntraVENous ONCE PRN    EPINEPHrine (ADRENALIN) 0.1 mg/mL syringe 1 mg  1 mg Endoscopically ONCE PRN     Facility-Administered Medications Ordered in Other Encounters   Medication Dose Route Frequency    0.9% sodium chloride infusion   IntraVENous CONTINUOUS       Social History:  Social History   Substance Use Topics    Smoking status: Never Smoker    Smokeless tobacco: Never Used    Alcohol use 0.0 oz/week     0 Standard drinks or equivalent per week      Comment: occasionally - very little       Family History:  Family History   Problem Relation Age of Onset    Heart Disease Mother     Cancer Mother 80     colorectal cancer    Cancer Father      neck    Cancer Maternal Grandmother      ?colon cancer    Cancer Paternal Grandmother      ?colon cancer    Cancer Other      breast - cousins             Review of Systems:      Constitutional: negative fever, negative chills, negative weight loss  Eyes:   negative visual changes  ENT:   negative sore throat, tongue or lip swelling  Respiratory:  negative cough, negative dyspnea  Cards:  negative for chest pain, palpitations, lower extremity edema  GI:   See HPI  :  negative for frequency, dysuria  Integument:  negative for rash and pruritus  Heme:  negative for easy bruising and gum/nose bleeding  Musculoskel: negative for myalgias,  back pain and muscle weakness  Neuro: negative for headaches, dizziness, vertigo  Psych:  negative for feelings of anxiety, depression       Objective:   Patient Vitals for the past 8 hrs:   BP Temp Pulse Resp SpO2   05/07/18 0715 145/87 97.5 °F (36.4 °C) 71 18 100 %             EXAM:     NEURO-a&o   HEENT-wnl   LUNGS-clear    COR-regular rate and rhythym     ABD-soft , no tenderness, no rebound, good bs     EXT-no edema     Data Review     No results for input(s): WBC, HGB, HCT, PLT, HGBEXT, HCTEXT, PLTEXT in the last 72 hours. No results for input(s): NA, K, CL, CO2, BUN, CREA, GLU, PHOS, CA in the last 72 hours. No results for input(s): SGOT, GPT, AP, TBIL, TP, ALB, GLOB, GGT, AML, LPSE in the last 72 hours. No lab exists for component: AMYP, HLPSE  No results for input(s): INR, PTP, APTT in the last 72 hours. No lab exists for component: INREXT       Assessment:   · Occult positive stool  · Family history of colon cancer  · Personal history of colon polyps in 2001     Patient Active Problem List   Diagnosis Code    Osteoporosis M81.0    Bicuspid aortic valve Q23.1    H/O: hypothyroidism Z86.39    Essential hypertension I10    Encounter for medication monitoring Z51.81     Plan:   · Endoscopic procedure with MAC     Signed By: Xavier Lizarraga MD     5/7/2018  7:34 AM

## 2018-05-07 NOTE — PROGRESS NOTES

## 2018-05-07 NOTE — IP AVS SNAPSHOT
110 [facility] [place] [address] [place] [address] 
[phone] Patient: Raoul Danielson MRN: [id number] QXB:[birth year] About your hospitalization You were admitted on: May 7, 2018 You last received care in the:  [facility] ENDOSCOPY You were discharged on: May 7, 2018 Why you were hospitalized Your primary diagnosis was:  Not on File Follow-up Information None Discharge Orders None A check levi indicates which time of day the medication should be taken. My Medications CONTINUE taking these medications Instructions Each Dose to Equal  
 Morning Noon Evening Bedtime ALEVE PO Your last dose was: Your next dose is: Take 2 Tabs by mouth every morning. 2 Tab BIOTIN PO Your last dose was: Your next dose is: Take 10,000 mcg by mouth daily. 81609 mcg CALCIUM + D PO Your last dose was: Your next dose is: Take 600 mg by mouth two (2) times a day. 600 mg CENTRUM SILVER PO Your last dose was: Your next dose is: Take  by mouth. Takes one po once daily. * ASPIRIN PO Your last dose was: Your next dose is: Take 81 mg by mouth daily. X 7 days. Started 4/30/2018  
 81 mg  
    
   
   
   
  
 * ECOTRIN 325 mg tablet Generic drug:  aspirin delayed-release Your last dose was: Your next dose is: Take 325 mg by mouth daily. 325 mg  
    
   
   
   
  
 fluticasone 50 mcg/actuation nasal spray Commonly known as:  Nila Ngo Your last dose was: Your next dose is:    
   
   
 USE 2 SPRAYS IN EACH NOSTRIL DAILY AS NEEDED FOR RHINITIS  
     
   
   
   
  
 lisinopril 5 mg tablet Commonly known as:  Gopi Estrin Your last dose was: Your next dose is: Take 5 mg by mouth every evening. 5 mg  
    
   
   
   
  
 raloxifene 60 mg tablet Commonly known as:  EVISTA Your last dose was: Your next dose is: Take 60 mg by mouth daily. 60 mg  
    
   
   
   
  
 ZYRTEC PO Your last dose was: Your next dose is: Take 10 mg by mouth daily. 10 mg  
    
   
   
   
  
 * Notice: This list has 2 medication(s) that are the same as other medications prescribed for you. Read the directions carefully, and ask your doctor or other care provider to review them with you. Discharge Instructions [address] 
611 [facility], [address] [place] [address] COLON DISCHARGE INSTRUCTIONS Svetlanapatricia Renhima [id number] 
1950 Discomfort: 
Redness at IV site- apply warm compress to area; if redness or soreness persist- contact your physician There may be a slight amount of blood passed from the rectum Gaseous discomfort- walking, belching will help relieve any discomfort You may not operate a vehicle for 12 hours You may not engage in an occupation involving machinery or appliances for rest of today You may not drink alcoholic beverages for at least 12 hours Avoid making any critical decisions for at least 24 hour DIET: 
You may resume your regular diet  however -  remember your colon is empty and a heavy meal will produce gas. Avoid these foods:  vegetables, fried / greasy foods, carbonated drinks ACTIVITY: 
You may  resume your normal daily activities it is recommended that you spend the remainder of the day resting -  avoid any strenuous activity. CALL M.D. ANY SIGN OF: Increasing pain, nausea, vomiting Abdominal distension (swelling) New increased bleeding (oral or rectal) Fever (chills) Pain in chest area Bloody discharge from nose or mouth Shortness of breath Follow-up Instructions: Call Dr. Billy Martinez for any questions or problems at [phone] ENDOSCOPY FINDINGS: 
 Your colonoscopy showed diverticulosis and hemorrhoids. Please maintain a high fiber diet. Your next colonoscopy will be due in 5 years. You may resume your Aspirin 325 mg tomorrow. Please minimize NSAID's otherwise. Telephone # [phone] Signed By: Christina Holter. Micky Houston MD   
 5/7/2018  7:56 AM 
  
 
DISCHARGE SUMMARY from Nurse The following personal items collected during your admission are returned to you:  
Dental Appliance: Dental Appliances: None Vision: Visual Aid: None Hearing Aid:   
Jewelry:   
Clothing:   
Other Valuables:   
Valuables sent to safe:   
 
 
  
Diverticulosis: Care Instructions Your Care Instructions In diverticulosis, pouches called diverticula form in the wall of the large intestine (colon). The pouches do not cause any pain or other symptoms. Most people who have diverticulosis do not know they have it. But the pouches sometimes bleed, and if they become infected, they can cause pain and other symptoms. When this happens, it is called diverticulitis. Diverticula form when pressure pushes the wall of the colon outward at certain weak points. A diet that is too low in fiber can cause diverticula. Follow-up care is a key part of your treatment and safety. Be sure to make and go to all appointments, and call your doctor if you are having problems. It's also a good idea to know your test results and keep a list of the medicines you take. How can you care for yourself at home? · Include fruits, leafy green vegetables, beans, and whole grains in your diet each day. These foods are high in fiber. · Take a fiber supplement, such as Citrucel or Metamucil, every day if needed. Read and follow all instructions on the label. · Drink plenty of fluids, enough so that your urine is light yellow or clear like water.  If you have kidney, heart, or liver disease and have to limit fluids, talk with your doctor before you increase the amount of fluids you drink. · Get at least 30 minutes of exercise on most days of the week. Walking is a good choice. You also may want to do other activities, such as running, swimming, cycling, or playing tennis or team sports. · Cut out foods that cause gas, pain, or other symptoms. When should you call for help? Call your doctor now or seek immediate medical care if: 
? · You have belly pain. ? · You pass maroon or very bloody stools. ? · You have a fever. ? · You have nausea and vomiting. ? · You have unusual changes in your bowel movements or abdominal swelling. ? · You have burning pain when you urinate. ? · You have abnormal vaginal discharge. ? · You have shoulder pain. ? · You have cramping pain that does not get better when you have a bowel movement or pass gas. ? · You pass gas or stool from your urethra while urinating. ? Watch closely for changes in your health, and be sure to contact your doctor if you have any problems. Where can you learn more? Go to http://august-mariela.info/. Enter U883 in the search box to learn more about \"Diverticulosis: Care Instructions. \" Current as of: May 12, 2017 Content Version: 11.4 © 5769-8604 Healthwise, [place]. Care instructions adapted under license by [employer] (which disclaims liability or warranty for this information). If you have questions about a medical condition or this instruction, always ask your healthcare professional. [place] [address] any warranty or liability for your use of this information. ACO Transitions of Care Introducing [employer] [address] offers a voluntary care coordination program to provide high quality service and care to [facility] fee-for-service beneficiaries.   
 
[facility] was designed to help you enhance your health and well-being through the following services: ? Transitions of Care  support for individuals who are transitioning from one care setting to another (example: Hospital to home). ? Chronic and Complex Care Coordination  support for individuals and caregivers of those with serious or chronic illnesses or with more than one chronic (ongoing) condition and those who take a number of different medications. If you meet specific medical criteria, a [address] [place] [place] [address] Rd may call you directly to coordinate your care with your primary care physician and your other care providers. For questions about the [facility] programs, please, contact your physicians office. For general questions or additional information about Accountable Care Organizations: 
Please visit www.medicare.gov/acos. html or call 1-800-MEDICARE (9-410.532.3956) TTY users should call 7-590.763.8184. Introducing [facility]! [facility] introduces The E[employer] Network (TEN) patient portal. Now you can access parts of your medical record, email your doctor's office, and request medication refills online. 1. In your internet browser, go to [email or web address] 2. Click on the First Time User? Click Here link in the Sign In box. You will see the New Member Sign Up page. 3. Enter your [employer] Network (TEN) Access Code exactly as it appears below. You will not need to use this code after youve completed the sign-up process. If you do not sign up before the expiration date, you must request a new code. · [employer] Network (TEN) Access Code: [email or web address] Expires: 8/5/2018  8:10 AM 
 
4. Enter the last four digits of your Social Security Number (xxxx) and Date of Birth (mm/dd/yyyy) as indicated and click Submit. You will be taken to the next sign-up page. 5. Create a The [employer] Network (TEN) ID. This will be your The E[employer] Network (TEN) login ID and cannot be changed, so think of one that is secure and easy to remember. 6. Create a iNEWiT password. You can change your password at any time. 7. Enter your Password Reset Question and Answer. This can be used at a later time if you forget your password. 8. Enter your e-mail address. You will receive e-mail notification when new information is available in [address] 9. Click Sign Up. You can now view and download portions of your medical record. 10. Click the Download Summary menu link to download a portable copy of your medical information. If you have questions, please visit the Frequently Asked Questions section of the iNEWiT website. Remember, iNEWiT is NOT to be used for urgent needs. For medical emergencies, dial 911. Now available from your iPhone and Android! Introducing Clive Yanez As a [facility] patient, I wanted to make you aware of our electronic visit tool called Clive Yanez. [facility] 24/7 allows you to connect within minutes with a medical provider 24 hours a day, seven days a week via a mobile device or tablet or logging into a secure website from your computer. You can access Clive Yanez from anywhere in the [place]. A virtual visit might be right for you when you have a simple condition and feel like you just dont want to get out of bed, or cant get away from work for an appointment, when your regular [facility] provider is not available (evenings, weekends or holidays), or when youre out of town and need minor care. Electronic visits cost only $49 and if the [facility] 24/7 provider determines a prescription is needed to treat your condition, one can be electronically transmitted to a nearby pharmacy*. Please take a moment to enroll today if you have not already done so. The enrollment process is free and takes just a few minutes. To enroll, please download the [facility] 24/7 cali to your tablet or phone, or visit [email or web address] to enroll on your computer. And, as an [address] [place] [address] patient with a Infrafone account, the results of your visits will be scanned into your electronic medical record and your primary care provider will be able to view the scanned results. We urge you to continue to see your regular [facility] provider for your ongoing medical care. And while your primary care provider may not be the one available when you seek a Clive Tierneyfin virtual visit, the peace of mind you get from getting a real diagnosis real time can be priceless. For more information on Clive Tierneyfin, view our Frequently Asked Questions (FAQs) at [email or web address]. Sincerely, 
 
Annel Glez MD 
Chief Medical Officer [address] *:  certain medications cannot be prescribed via Clive Titojaunfin Providers Seen During Your Hospitalization Provider Specialty Primary office phone Danilo Mendez MD Gastroenterology [phone] Your Primary Care Physician (PCP) Primary Care Physician Office Phone Office Fax Myriam Liter [phone] You are allergic to the following Allergen Reactions Penicillin G Hives Recent Documentation OB Status Smoking Status Postmenopausal Never Smoker Emergency Contacts Name Discharge Info Relation Home Work Mobile Carolyn Ax DISCHARGE CAREGIVER [3] Spouse [3] [phone] Patient Belongings The following personal items are in your possession at time of discharge: 
  Dental Appliances: None  Visual Aid: None Please provide this summary of care documentation to your next provider. Signatures-by signing, you are acknowledging that this After Visit Summary has been reviewed with you and you have received a copy. Patient Signature:  ____________________________________________________________ Date:  ____________________________________________________________  
  
Stephen Matsu Provider Signature:  ____________________________________________________________ Date:  ____________________________________________________________

## 2018-08-01 RX ORDER — CETIRIZINE HCL 10 MG
10 TABLET ORAL DAILY
Qty: 90 TAB | Refills: 30 | Status: SHIPPED | OUTPATIENT
Start: 2018-08-01 | End: 2019-11-09 | Stop reason: SDUPTHER

## 2018-08-01 RX ORDER — CETIRIZINE HCL 10 MG
10 TABLET ORAL DAILY
Qty: 90 TAB | Refills: 3 | OUTPATIENT
Start: 2018-08-01

## 2018-08-01 NOTE — TELEPHONE ENCOUNTER
----- Message from Migdalia Sánchez sent at 8/1/2018  8:57 AM EDT -----  Regarding: Dr. Italo Davis/RX  [facility]  Best contact [phone]

## 2019-01-16 ENCOUNTER — OFFICE VISIT (OUTPATIENT)
Dept: FAMILY MEDICINE CLINIC | Age: 69
End: 2019-01-16

## 2019-01-16 VITALS
SYSTOLIC BLOOD PRESSURE: 154 MMHG | OXYGEN SATURATION: 99 % | BODY MASS INDEX: 28.55 KG/M2 | HEART RATE: 87 BPM | WEIGHT: 151.2 LBS | HEIGHT: 61 IN | DIASTOLIC BLOOD PRESSURE: 74 MMHG | RESPIRATION RATE: 16 BRPM | TEMPERATURE: 98.3 F

## 2019-01-16 DIAGNOSIS — K21.9 GASTROESOPHAGEAL REFLUX DISEASE, ESOPHAGITIS PRESENCE NOT SPECIFIED: ICD-10-CM

## 2019-01-16 DIAGNOSIS — S93.492S SPRAIN OF ANTERIOR TALOFIBULAR LIGAMENT OF LEFT ANKLE, SEQUELA: ICD-10-CM

## 2019-01-16 DIAGNOSIS — I10 ESSENTIAL HYPERTENSION: ICD-10-CM

## 2019-01-16 DIAGNOSIS — R10.30 LOWER ABDOMINAL PAIN: Primary | ICD-10-CM

## 2019-01-16 DIAGNOSIS — Z51.81 ENCOUNTER FOR MEDICATION MONITORING: ICD-10-CM

## 2019-01-16 DIAGNOSIS — R07.89 CHEST WALL PAIN: ICD-10-CM

## 2019-01-16 LAB
BILIRUB UR QL STRIP: NEGATIVE
GLUCOSE UR-MCNC: NEGATIVE MG/DL
KETONES P FAST UR STRIP-MCNC: NEGATIVE MG/DL
PH UR STRIP: 5.5 [PH] (ref 4.6–8)
PROT UR QL STRIP: NEGATIVE
SP GR UR STRIP: 1 (ref 1–1.03)
UA UROBILINOGEN AMB POC: NORMAL (ref 0.2–1)
URINALYSIS CLARITY POC: CLEAR
URINALYSIS COLOR POC: YELLOW
URINE BLOOD POC: NORMAL
URINE LEUKOCYTES POC: NORMAL
URINE NITRITES POC: NEGATIVE

## 2019-01-16 RX ORDER — ACETAMINOPHEN AND CODEINE PHOSPHATE 300; 30 MG/1; MG/1
TABLET ORAL
Refills: 0 | COMMUNITY
Start: 2018-10-12 | End: 2019-01-17 | Stop reason: ALTCHOICE

## 2019-01-16 RX ORDER — IBUPROFEN 800 MG/1
TABLET ORAL
Refills: 0 | COMMUNITY
Start: 2018-10-12 | End: 2019-01-17 | Stop reason: ALTCHOICE

## 2019-01-16 RX ORDER — PANTOPRAZOLE SODIUM 40 MG/1
40 TABLET, DELAYED RELEASE ORAL DAILY
Qty: 30 TAB | Refills: 1 | Status: SHIPPED | OUTPATIENT
Start: 2019-01-16 | End: 2019-03-25 | Stop reason: SDUPTHER

## 2019-01-16 NOTE — PROGRESS NOTES
HISTORY OF PRESENT ILLNESS  Camila Isabel is a 76 y.o. female. HPI   C/o several issues. Has been having intermittent left ankle pain over the past month. No known injury. No swelling. Thinks that the pain overall is getting better. Intermittent left lower abd pain. Comes and goes. Thinks it might be related to her diverticulosis but has not mickie able to identify any food triggers. Had pain in the middle of the chest over the sternum. Seen by cardiology and was told not d/t her heart. [place] like indigestion. Has had some occasional discomfort still that comes and goes. Patient Active Problem List   Diagnosis Code    Osteoporosis M81.0    Bicuspid aortic valve Q23.1    H/O: hypothyroidism Z86.39    Essential hypertension I10    Encounter for medication monitoring Z51.81       Current Outpatient Medications   Medication Sig Dispense Refill    pantoprazole (PROTONIX) 40 mg tablet Take 1 Tab by mouth daily. 30 Tab 1    cetirizine (ZYRTEC) 10 mg tablet Take 1 Tab by mouth daily. 90 Tab 30    folic acid/multivit-min/lutein (CENTRUM SILVER PO) Take  by mouth. Takes one po once daily.  lisinopril (PRINIVIL, ZESTRIL) 5 mg tablet Take 5 mg by mouth every evening.  raloxifene (EVISTA) 60 mg tablet Take 60 mg by mouth daily.  BIOTIN PO Take 10,000 mcg by mouth daily.  CALCIUM CARBONATE/VITAMIN D3 (CALCIUM + D PO) Take 600 mg by mouth two (2) times a day.  aspirin delayed-release (ECOTRIN) 325 mg tablet Take 325 mg by mouth daily.       fluticasone (FLONASE) 50 mcg/actuation nasal spray USE 2 SPRAYS IN EACH NOSTRIL DAILY AS NEEDED FOR RHINITIS 48 g 3       Allergies   Allergen Reactions    Penicillin G Hives       Past Medical History:   Diagnosis Date    Bicuspid aortic valve     Fibroid uterus     Hypertension     Obstructive sleep apnea on CPAP 2017    Osteoporosis     Thyroid disease     hx of synthroid use       Past Surgical History:   Procedure Laterality Date  COLONOSCOPY N/A 5/7/2018    COLONOSCOPY performed by Sherry Whitley. Violette Gutierrez MD [facility] ENDOSCOPY    ENDOSCOPY, COLON, DIAGNOSTIC      HX GYN      left ovarian cyst removed    HX GYN  12/2018    D&C    HX KNEE ARTHROSCOPY Bilateral     torn meniscus    HX ORTHOPAEDIC      cyst left hand    HX ORTHOPAEDIC      bakers cyst    HX TONSILLECTOMY         Family History   Problem Relation Age of Onset    Heart Disease Mother     Cancer Mother 80        colorectal cancer    Cancer Father         neck    Cancer Maternal Grandmother         ?colon cancer    Cancer Paternal Grandmother         ?colon cancer    Cancer Other         breast - cousins       Social History     Tobacco Use    Smoking status: Never Smoker    Smokeless tobacco: Never Used   Substance Use Topics    Alcohol use: Yes     Alcohol/week: 0.0 oz     Comment: occasionally - very little        Review of Systems   Constitutional: Negative for malaise/fatigue. HENT: Negative for congestion. Eyes: Negative for blurred vision. Respiratory: Negative for cough and shortness of breath. Cardiovascular: Negative for chest pain, palpitations and leg swelling. Gastrointestinal: Negative for abdominal pain, constipation and heartburn. Genitourinary: Negative for dysuria, frequency and urgency. Musculoskeletal: Negative for back pain and joint pain. Neurological: Negative for dizziness, tingling and headaches. Endo/Heme/Allergies: Negative for environmental allergies. Psychiatric/Behavioral: Negative for depression. The patient does not have insomnia. Physical Exam   Constitutional: She appears well-developed and well-nourished.    /74   Pulse 87   Temp 98.3 °F (36.8 °C) (Oral)   Resp 16   Ht 5' 1\" (1.549 m)   Wt 151 lb 3.2 oz (68.6 kg)   LMP 01/01/2004   SpO2 99%   BMI 28.57 kg/m²    HENT:   Right Ear: Tympanic membrane and ear canal normal.   Left Ear: Tympanic membrane and ear canal normal.   Nose: No mucosal edema or rhinorrhea. Mouth/Throat: Oropharynx is clear and moist and mucous membranes are normal.   Neck: Normal range of motion. Neck supple. No thyromegaly present. Cardiovascular: Normal rate, regular rhythm and normal heart sounds. Exam reveals no gallop. Pulmonary/Chest: Effort normal and breath sounds normal.   Abdominal: Soft. Normal appearance and bowel sounds are normal. She exhibits no mass. There is no tenderness. Musculoskeletal: Normal range of motion. She exhibits no edema. Lymphadenopathy:     She has no cervical adenopathy. Skin: Skin is warm and dry. Psychiatric: She has a normal mood and affect. Nursing note and vitals reviewed. ASSESSMENT and PLAN  Diagnoses and all orders for this visit:    1. Lower abdominal pain  -     AMB POC URINALYSIS DIP STICK AUTO W/ MICRO  Discussed diet. Increase fiber. 2. Chest wall pain//  3. Gastroesophageal reflux disease, esophagitis presence not specified  -     pantoprazole (PROTONIX) 40 mg tablet; Take 1 Tab by mouth daily. 4. Sprain of anterior talofibular ligament of left ankle, sequela  Ankle exercises    5. Essential hypertension  States that her cardiologist say José Manuel Hoffman might have to go up on her medications\". Has follow up with cardiology. Discussed sodium restriction, high k rich diet, maintaining ideal body weight and regular exercise program such as daily walking 30 min perday 4-5 times per week, as physiologic means to achieve blood pressure control.  Medication compliance advised. 6. Encounter for medication monitoring      Follow-up Disposition:   reviewed diet, exercise and weight control  cardiovascular risk and specific lipid/LDL goals reviewed    I have discussed diagnosis listed in this note with pt and/or family. I have discussed treatment plans and options and the risk/benefit analysis of those options, including safe use of medications and possible medication side effects.    Through the use of shared decision making we have agreed to the above plan. The patient has received an after-visit summary and questions were answered concerning future plans and follow up. Advise pt of any urgent changes then to proceed to the ER.

## 2019-01-16 NOTE — PROGRESS NOTES
Chief Complaint   Patient presents with    Ankle Pain     random left ankle    Abdominal Pain     random left lower quandrant    Sternum Pain     random    Leg Swelling     random right lower calf     Patient here for multiple complaints that patient states are random. No ER visits. Seen by cardiology and oncology.

## 2019-01-17 ENCOUNTER — TELEPHONE (OUTPATIENT)
Dept: FAMILY MEDICINE CLINIC | Age: 69
End: 2019-01-17

## 2019-03-25 ENCOUNTER — OFFICE VISIT (OUTPATIENT)
Dept: FAMILY MEDICINE CLINIC | Age: 69
End: 2019-03-25

## 2019-03-25 VITALS
WEIGHT: 149 LBS | RESPIRATION RATE: 16 BRPM | TEMPERATURE: 97.6 F | HEIGHT: 61 IN | OXYGEN SATURATION: 99 % | HEART RATE: 67 BPM | SYSTOLIC BLOOD PRESSURE: 143 MMHG | DIASTOLIC BLOOD PRESSURE: 70 MMHG | BODY MASS INDEX: 28.13 KG/M2

## 2019-03-25 DIAGNOSIS — Z00.00 MEDICARE ANNUAL WELLNESS VISIT, SUBSEQUENT: Primary | ICD-10-CM

## 2019-03-25 DIAGNOSIS — Z91.09 ENVIRONMENTAL ALLERGIES: ICD-10-CM

## 2019-03-25 DIAGNOSIS — K21.9 GASTROESOPHAGEAL REFLUX DISEASE, ESOPHAGITIS PRESENCE NOT SPECIFIED: ICD-10-CM

## 2019-03-25 DIAGNOSIS — M54.12 CERVICAL RADICULAR PAIN: ICD-10-CM

## 2019-03-25 DIAGNOSIS — R10.10 UPPER ABDOMINAL PAIN: ICD-10-CM

## 2019-03-25 DIAGNOSIS — Z51.81 ENCOUNTER FOR MEDICATION MONITORING: ICD-10-CM

## 2019-03-25 DIAGNOSIS — I10 ESSENTIAL HYPERTENSION: ICD-10-CM

## 2019-03-25 LAB
BILIRUB UR QL STRIP: NEGATIVE
GLUCOSE UR-MCNC: NEGATIVE MG/DL
KETONES P FAST UR STRIP-MCNC: NEGATIVE MG/DL
PH UR STRIP: 5.5 [PH] (ref 4.6–8)
PROT UR QL STRIP: NEGATIVE
SP GR UR STRIP: 1.02 (ref 1–1.03)
UA UROBILINOGEN AMB POC: NORMAL (ref 0.2–1)
URINALYSIS CLARITY POC: CLEAR
URINALYSIS COLOR POC: YELLOW
URINE BLOOD POC: NEGATIVE
URINE LEUKOCYTES POC: NEGATIVE
URINE NITRITES POC: NEGATIVE

## 2019-03-25 RX ORDER — CHOLECALCIFEROL (VITAMIN D3) 125 MCG
440 CAPSULE ORAL DAILY
COMMUNITY
End: 2019-05-20

## 2019-03-25 RX ORDER — PANTOPRAZOLE SODIUM 40 MG/1
40 TABLET, DELAYED RELEASE ORAL DAILY
Qty: 30 TAB | Refills: 1
Start: 2019-03-25 | End: 2019-05-20

## 2019-03-25 RX ORDER — FLUTICASONE PROPIONATE 50 MCG
SPRAY, SUSPENSION (ML) NASAL
Qty: 3 BOTTLE | Refills: 3 | Status: SHIPPED | OUTPATIENT
Start: 2019-03-25 | End: 2021-04-16 | Stop reason: SDUPTHER

## 2019-03-25 RX ORDER — GUAIFENESIN 100 MG/5ML
81 LIQUID (ML) ORAL DAILY
COMMUNITY
End: 2019-05-20

## 2019-03-25 NOTE — PROGRESS NOTES
This is a Subsequent Medicare Annual Wellness Exam (AWV) (Performed 12 months after IPPE or effective date of Medicare Part B enrollment)    I have reviewed the patient's medical history in detail and updated the computerized patient record. Issues from the last visit have resolved and improved. Did have about of abd pain while whe was in [place]. dont know that if she had had something. Pain in the right back of the arm and upper neck several months ago. Seen at [facility].  Started after Zip lining. Now pain has come back with pain in the upper back. Radiates to the back of the shoulder. Did get a massage which did help the pain. C/o upper abd pain over the past several months despite taking the Protonix. Has aching in the upper abd. Has burning sensation in the mid upper abd. Sx comes and goes. No nausea or vomiting. No change in bowel habits. No blood in the stool and no melena. No food triggers noted. BP follow up:  Compliant w/ medication, low salt diet, and exercise. BP home monitoring 130s/70s. States that her BP is always higher at doctor visit. No swelling, headache or dizziness. No chest pain, SOB, palpitations        History     Patient Active Problem List   Diagnosis Code    Osteoporosis M81.0    Bicuspid aortic valve Q23.1    H/O: hypothyroidism Z86.39    Essential hypertension I10    Encounter for medication monitoring Z51.81       Current Outpatient Medications   Medication Sig Dispense Refill    aspirin 81 mg chewable tablet Take 81 mg by mouth daily.  naproxen sodium (ALEVE) 220 mg cap Take 440 mg by mouth daily.  pantoprazole (PROTONIX) 40 mg tablet Take 1 Tab by mouth daily. 30 Tab 1    cetirizine (ZYRTEC) 10 mg tablet Take 1 Tab by mouth daily.  90 Tab 30    folic acid/multivit-min/lutein (CENTRUM SILVER PO) Takes 1 tab by mouth daily      fluticasone (FLONASE) 50 mcg/actuation nasal spray USE 2 SPRAYS IN EACH NOSTRIL DAILY AS NEEDED FOR RHINITIS 48 g 3    lisinopril (PRINIVIL, ZESTRIL) 5 mg tablet Take 5 mg by mouth every evening.  raloxifene (EVISTA) 60 mg tablet Take 60 mg by mouth daily.  BIOTIN PO Take 10,000 mcg by mouth daily.  CALCIUM CARBONATE/VITAMIN D3 (CALCIUM + D PO) Take 600 mg by mouth two (2) times a day.  aspirin delayed-release (ECOTRIN) 325 mg tablet Take 325 mg by mouth daily. Allergies   Allergen Reactions    Penicillin G Hives       Past Medical History:   Diagnosis Date    Bicuspid aortic valve     Fibroid uterus     Hypertension     Obstructive sleep apnea on CPAP 2017    Osteoporosis     Thyroid disease     hx of synthroid use       Past Surgical History:   Procedure Laterality Date    COLONOSCOPY N/A 5/7/2018    COLONOSCOPY performed by Heidi Lee. Korin Valladares MD at [facility] ENDOSCOPY    ENDOSCOPY, COLON, DIAGNOSTIC      HX GYN      left ovarian cyst removed    HX GYN  12/2018    D&C    HX KNEE ARTHROSCOPY Bilateral     torn meniscus    HX ORTHOPAEDIC      cyst left hand    HX ORTHOPAEDIC      bakers cyst    HX TONSILLECTOMY         Family History   Problem Relation Age of Onset    Heart Disease Mother     Cancer Mother 80        colorectal cancer    Cancer Father         neck    No Known Problems Sister     No Known Problems Sister     Cancer Maternal Grandmother         ?colon cancer    Cancer Paternal Grandmother         ?colon cancer    Cancer Other         breast - cousins       Social History     Tobacco Use    Smoking status: Never Smoker    Smokeless tobacco: Never Used   Substance Use Topics    Alcohol use:  Yes     Alcohol/week: 0.0 oz     Comment: occasionally - very little       Lab Results   Component Value Date/Time    WBC 7.2 02/28/2018 10:21 AM    HGB 12.9 02/28/2018 10:21 AM    HCT 37.8 02/28/2018 10:21 AM    PLATELET 042 31/19/6037 10:21 AM    MCV 91 02/28/2018 10:21 AM     Lab Results   Component Value Date/Time    Cholesterol, total 221 (H) 02/28/2018 10:21 AM HDL Cholesterol 81 02/28/2018 10:21 AM    LDL, calculated 124 (H) 02/28/2018 10:21 AM    Triglyceride 80 02/28/2018 10:21 AM     Lab Results   Component Value Date/Time    TSH 1.890 02/28/2018 10:21 AM    T4, Free 1.32 02/22/2017 11:53 AM      Lab Results   Component Value Date/Time    Sodium 141 02/28/2018 10:21 AM    Potassium 4.6 02/28/2018 10:21 AM    Chloride 99 02/28/2018 10:21 AM    CO2 26 02/28/2018 10:21 AM    Glucose 98 02/28/2018 10:21 AM    BUN 19 02/28/2018 10:21 AM    Creatinine 0.92 02/28/2018 10:21 AM    BUN/Creatinine ratio 21 02/28/2018 10:21 AM    GFR est AA 75 02/28/2018 10:21 AM    GFR est non-AA 65 02/28/2018 10:21 AM    Calcium 10.7 (H) 02/28/2018 10:21 AM    Bilirubin, total 0.4 02/28/2018 10:21 AM    ALT (SGPT) 16 02/28/2018 10:21 AM    AST (SGOT) 19 02/28/2018 10:21 AM    Alk. phosphatase 53 02/28/2018 10:21 AM    Protein, total 7.2 02/28/2018 10:21 AM    Albumin 4.6 02/28/2018 10:21 AM    A-G Ratio 1.8 02/28/2018 10:21 AM      Lab Results   Component Value Date/Time    Hemoglobin A1c 5.8 (H) 01/07/2014 11:52 AM    Hemoglobin A1c (POC) 5.3 01/26/2016 10:30 AM         Depression Risk Factor Screening:     PHQ over the last two weeks 11/7/2017   Little interest or pleasure in doing things Not at all   Feeling down, depressed or hopeless Not at all   Total Score PHQ 2 0     Alcohol Risk Factor Screening: You do not drink alcohol or very rarely. Functional Ability and Level of Safety:   Hearing Loss  Hearing is good. Activities of Daily Living  The home contains: no safety equipment. Patient does total self care    Fall RiskFall Risk Assessment, last 12 mths 11/7/2017   Able to walk? Yes   Fall in past 12 months? No     Functional Ability:   Does the patient exhibit a steady gait?   yes    How long did it take the patient to get up and walk from a sitting position? seconds    Is the patient self reliant? (ie can do own laundry, meals, household chores)  yes   Does the patient handle his/her own medications? yes   Does the patient handle his/her own money? yes   Is the patients home safe (ie good lighting, handrails on stairs and bath, etc.)? yes   Did you notice or did patient express any hearing difficulties? no   Did you notice or did patient express any vision difficulties? no        Advance Care Planning:   Patient was offered the opportunity to discuss advance care planning:  yes    Does patient have an Advance Directive:  no   If no, did you provide information on [facility]?   yes      Abuse Screen  Patient is not abused    Cognitive Screening   Evaluation of Cognitive Function:  Has your family/caregiver stated any concerns about your memory: no    Review of Systems   Constitutional: negative for fatigue and malaise  Eyes: negative for irritation and redness  Ears, nose, mouth, throat, and face: negative for earaches, nasal congestion and hoarseness  Respiratory: negative for cough, sputum or dyspnea on exertion  Cardiovascular: negative for chest pain, chest pressure/discomfort, dyspnea, palpitations, irregular heart beats, fatigue, lower extremity edema  Gastrointestinal: negative for dysphagia, nausea, vomiting, change in bowel habits, melena, constipation  Genitourinary:negative for frequency, dysuria, nocturia, urinary incontinence Integument/breast: negative for rash and dryness  Hematologic/lymphatic: negative for easy bruising and lymphadenopathy  Musculoskeletal:negative for myalgias, arthralgias, stiff joints, and muscle weakness  Neurological: negative for headaches, dizziness, tremor and weakness  Endocrine: negative for diabetic symptoms including polyuria and polydipsia      Physical Examination     Evaluation of Cognitive Function:  Mood/affect:  neutral  Appearance: age appropriate  Family member/caregiver input: NA    Visit Vitals  /70 (BP 1 Location: Right arm, BP Patient Position: Sitting)   Pulse 67   Temp 97.6 °F (36.4 °C) (Oral)   Resp 16   Ht 5' 1\" (1.549 m)   Wt 149 lb (67.6 kg)   LMP 01/01/2004   SpO2 99%   BMI 28.15 kg/m²       General:  Alert, cooperative, no distress, appears stated age. Head:  Normocephalic, without obvious abnormality, atraumatic. Ears:  Normal TMs and external ear canals both ears. Nose: Nares normal. Septum midline. Mucosa normal. No drainage or sinus tenderness. Throat: Lips, mucosa, and tongue normal. Teeth and gums normal.   Neck: Supple, symmetrical, trachea midline, no adenopathy, thyroid: no enlargement/tenderness/nodules, no carotid bruit and no JVD. Back:   Symmetric, no curvature. ROM normal. No CVA tenderness. Had mild tenderness in the upper back and posterior shoulder. FROM of the shoulder and neck with mild discomfort. Lungs:   Clear to auscultation bilaterally. Chest wall:  No tenderness or deformity. Heart:  Regular rate and rhythm, S1, S2 normal, no murmur, click, rub or gallop. Breast Exam:  No tenderness, masses, or nipple abnormality. Abdomen:   Soft, non-tender. Bowel sounds normal. No masses,  No organomegaly. Rectal:  Per pt done by her GYN. Extremities: Extremities normal, atraumatic, no cyanosis or edema. Pulses: 2+ and symmetric all extremities. Skin: Skin color, texture, turgor normal. No rashes or lesions. Lymph nodes: Cervical, supraclavicular, and axillary nodes normal.   Neurologic: CNII-XII intact. Normal strength, sensation and reflexes throughout. Patient Care Team   Patient Care Team:  Rudolph Turner MD as PCP - General    Assessment/Plan   Education and counseling provided:  Are appropriate based on today's review and evaluation  End-of-Life planning (with patient's consent)    ASSESSMENT and PLAN  Diagnoses and all orders for this visit:    1. Medicare annual wellness visit, subsequent  -     AMB POC URINALYSIS DIP STICK AUTO W/ MICRO    2.  Essential hypertension  Discussed sodium restriction, high k rich diet, maintaining ideal body weight and regular exercise program such as daily walking 30 min perday 4-5 times per week, as physiologic means to achieve blood pressure control.  Medication compliance advised. 3. Cervical radicular pain  -     XR SPINE CERV MIN 6 VWS; Future  Instructions for exercises given and reviewed with pt. Pt also to use heat to the area 3-4 times a day over the next several days until sx are improved. 4. Upper abdominal pain  5. Gastroesophageal reflux disease, esophagitis presence not specified  -     REFERRAL TO GASTROENTEROLOGY  -     Add pantoprazole (PROTONIX) 40 mg tablet; Take 1 Tab by mouth daily. 6. Encounter for medication monitoring    7. Environmental allergies  -     fluticasone propionate (FLONASE) 50 mcg/actuation nasal spray; USE 2 SPRAYS IN EACH NOSTRIL DAILY AS NEEDED FOR RHINITIS          reviewed diet, exercise and weight control  cardiovascular risk and specific lipid/LDL goals reviewed  reviewed medications and side effects in detail    I have discussed diagnosis listed in this note with pt and/or family. I have discussed treatment plans and options and the risk/benefit analysis of those options, including safe use of medications and possible medication side effects. Through the use of shared decision making we have agreed to the above plan. The patient has received an after-visit summary and questions were answered concerning future plans and follow up. Advise pt of any urgent changes then to proceed to the ER.

## 2019-05-20 ENCOUNTER — HOSPITAL ENCOUNTER (OUTPATIENT)
Age: 69
Setting detail: OUTPATIENT SURGERY
Discharge: HOME | End: 2019-05-20
Attending: INTERNAL MEDICINE | Admitting: INTERNAL MEDICINE
Payer: MEDICARE

## 2019-05-20 ENCOUNTER — ANESTHESIA (OUTPATIENT)
Dept: ENDOSCOPY | Age: 69
End: 2019-05-20
Payer: MEDICARE

## 2019-05-20 ENCOUNTER — ANESTHESIA EVENT (OUTPATIENT)
Dept: ENDOSCOPY | Age: 69
End: 2019-05-20
Payer: MEDICARE

## 2019-05-20 VITALS
HEIGHT: 61 IN | DIASTOLIC BLOOD PRESSURE: 65 MMHG | OXYGEN SATURATION: 98 % | HEART RATE: 63 BPM | SYSTOLIC BLOOD PRESSURE: 116 MMHG | TEMPERATURE: 97.7 F | RESPIRATION RATE: 12 BRPM | BODY MASS INDEX: 28.13 KG/M2 | WEIGHT: 149 LBS

## 2019-05-20 PROCEDURE — 74011250636 HC RX REV CODE- 250/636

## 2019-05-20 PROCEDURE — 76060000032 HC ANESTHESIA 0.5 TO 1 HR: Performed by: INTERNAL MEDICINE

## 2019-05-20 PROCEDURE — 74011250636 HC RX REV CODE- 250/636: Performed by: INTERNAL MEDICINE

## 2019-05-20 PROCEDURE — 76040000007: Performed by: INTERNAL MEDICINE

## 2019-05-20 PROCEDURE — 77030021593 HC FCPS BIOP ENDOSC BSC -A: Performed by: INTERNAL MEDICINE

## 2019-05-20 PROCEDURE — 88305 TISSUE EXAM BY PATHOLOGIST: CPT

## 2019-05-20 RX ORDER — PROPOFOL 10 MG/ML
INJECTION, EMULSION INTRAVENOUS AS NEEDED
Status: DISCONTINUED | OUTPATIENT
Start: 2019-05-20 | End: 2019-05-20 | Stop reason: HOSPADM

## 2019-05-20 RX ORDER — NALOXONE HYDROCHLORIDE 0.4 MG/ML
0.4 INJECTION, SOLUTION INTRAMUSCULAR; INTRAVENOUS; SUBCUTANEOUS
Status: DISCONTINUED | OUTPATIENT
Start: 2019-05-20 | End: 2019-05-20 | Stop reason: HOSPADM

## 2019-05-20 RX ORDER — SODIUM CHLORIDE 9 MG/ML
100 INJECTION, SOLUTION INTRAVENOUS CONTINUOUS
Status: DISCONTINUED | OUTPATIENT
Start: 2019-05-20 | End: 2019-05-20 | Stop reason: HOSPADM

## 2019-05-20 RX ORDER — DIPHENHYDRAMINE HYDROCHLORIDE 50 MG/ML
50 INJECTION, SOLUTION INTRAMUSCULAR; INTRAVENOUS ONCE
Status: DISCONTINUED | OUTPATIENT
Start: 2019-05-20 | End: 2019-05-20 | Stop reason: HOSPADM

## 2019-05-20 RX ORDER — EPINEPHRINE 0.1 MG/ML
1 INJECTION INTRACARDIAC; INTRAVENOUS
Status: DISCONTINUED | OUTPATIENT
Start: 2019-05-20 | End: 2019-05-20 | Stop reason: HOSPADM

## 2019-05-20 RX ORDER — DEXTROMETHORPHAN/PSEUDOEPHED 2.5-7.5/.8
1.2 DROPS ORAL
Status: DISCONTINUED | OUTPATIENT
Start: 2019-05-20 | End: 2019-05-20 | Stop reason: HOSPADM

## 2019-05-20 RX ORDER — MIDAZOLAM HYDROCHLORIDE 1 MG/ML
.25-1 INJECTION, SOLUTION INTRAMUSCULAR; INTRAVENOUS
Status: DISCONTINUED | OUTPATIENT
Start: 2019-05-20 | End: 2019-05-20 | Stop reason: HOSPADM

## 2019-05-20 RX ORDER — SODIUM CHLORIDE 9 MG/ML
INJECTION, SOLUTION INTRAVENOUS
Status: DISCONTINUED | OUTPATIENT
Start: 2019-05-20 | End: 2019-05-20 | Stop reason: HOSPADM

## 2019-05-20 RX ORDER — ATROPINE SULFATE 0.1 MG/ML
0.5 INJECTION INTRAVENOUS
Status: DISCONTINUED | OUTPATIENT
Start: 2019-05-20 | End: 2019-05-20 | Stop reason: HOSPADM

## 2019-05-20 RX ORDER — FENTANYL CITRATE 50 UG/ML
100 INJECTION, SOLUTION INTRAMUSCULAR; INTRAVENOUS
Status: DISCONTINUED | OUTPATIENT
Start: 2019-05-20 | End: 2019-05-20 | Stop reason: HOSPADM

## 2019-05-20 RX ORDER — SODIUM CHLORIDE 0.9 % (FLUSH) 0.9 %
5-40 SYRINGE (ML) INJECTION AS NEEDED
Status: DISCONTINUED | OUTPATIENT
Start: 2019-05-20 | End: 2019-05-20 | Stop reason: HOSPADM

## 2019-05-20 RX ORDER — FLUMAZENIL 0.1 MG/ML
0.2 INJECTION INTRAVENOUS
Status: DISCONTINUED | OUTPATIENT
Start: 2019-05-20 | End: 2019-05-20 | Stop reason: HOSPADM

## 2019-05-20 RX ORDER — SODIUM CHLORIDE 0.9 % (FLUSH) 0.9 %
5-40 SYRINGE (ML) INJECTION EVERY 8 HOURS
Status: DISCONTINUED | OUTPATIENT
Start: 2019-05-20 | End: 2019-05-20 | Stop reason: HOSPADM

## 2019-05-20 RX ADMIN — SODIUM CHLORIDE: 9 INJECTION, SOLUTION INTRAVENOUS at 16:02

## 2019-05-20 RX ADMIN — PROPOFOL 100 MG: 10 INJECTION, EMULSION INTRAVENOUS at 16:06

## 2019-05-20 RX ADMIN — PROPOFOL 100 MG: 10 INJECTION, EMULSION INTRAVENOUS at 16:05

## 2019-05-20 NOTE — ANESTHESIA PREPROCEDURE EVALUATION
Relevant Problems No relevant active problems Anesthetic History No history of anesthetic complications Review of Systems / Medical History Patient summary reviewed, nursing notes reviewed and pertinent labs reviewed Pulmonary Within defined limits Sleep apnea Neuro/Psych Within defined limits Cardiovascular Within defined limits Hypertension GI/Hepatic/Renal 
Within defined limits Endo/Other Within defined limits Hypothyroidism Other Findings Physical Exam 
 
Airway Mallampati: II 
TM Distance: > 6 cm Neck ROM: normal range of motion Mouth opening: Normal 
 
 Cardiovascular Regular rate and rhythm,  S1 and S2 normal,  no murmur, click, rub, or gallop Dental 
No notable dental hx Pulmonary Breath sounds clear to auscultation Abdominal 
GI exam deferred Other Findings Anesthetic Plan ASA: 2 Anesthesia type: MAC Anesthetic plan and risks discussed with: Patient

## 2019-05-20 NOTE — PROCEDURES
[address] [place] [address], [address] [place] [address] (EGD) Procedure Note    Selvin Wakefield  1950  [id number]      Procedure: Endoscopic Gastroduodenoscopy --diagnostic, with biopsy    Indication:  Abdominal pain, epigastric     Pre-operative Diagnosis: see indication above    Post-operative Diagnosis: see findings below    : Suresh Welch. Brittany Flaherty MD    Referring Provider:  Orvan Hammans, MD      Anesthesia/Sedation:  MAC anesthesia Propofol        Procedure Details     After informed consent was obtained for the procedure, with all risks and benefits of procedure explained the patient was taken to the endoscopy suite and placed in the left lateral decubitus position. Following sequential administration of sedation as per above, the endoscope was inserted into the mouth and advanced under direct vision to second portion of the duodenum. A careful inspection was made as the gastroscope was withdrawn, including a retroflexed view of the proximal stomach; findings and interventions are described below. Findings:   Esophagus:normal  Stomach: mild patchy erythema in the antrum - cold biopsied  Duodenum: normal to second portion - cold biopsied      Therapies: as above    Specimens: 1. Duodenum, 2. Gastric         EBL: None      Complications:   None; patient tolerated the procedure well. Impression:    As above    Recommendations:  1. Follow up surgical pathology  2. Treat for H pylori, if positive  3. Continue PPI  4. Avoid NSAID's, alcohol, and tobacco products    Signed By: Suresh Welch.  Brittany Flaherty MD     5/20/2019  4:11 PM

## 2019-05-20 NOTE — ANESTHESIA POSTPROCEDURE EVALUATION
Procedure(s): ESOPHAGOGASTRODUODENOSCOPY (EGD) ESOPHAGOGASTRODUODENAL (EGD) BIOPSY. MAC 
 
<BSHSIANPOST> Vitals Value Taken Time /60 5/20/2019  4:16 PM  
Temp Pulse 70 5/20/2019  4:18 PM  
Resp 18 5/20/2019  4:18 PM  
SpO2 96 % 5/20/2019  4:18 PM  
Vitals shown include unvalidated device data.

## 2019-05-20 NOTE — H&P
[address] [facility] [address] [facility], [address] [place] [address] History and Physical    
 
NAME:  Mago Williamson :   1950 MRN:   [id number] History of Present Illness:  Patient is a 71 y.o. who is seen for epigastric pain. PMH: 
Past Medical History:  
Diagnosis Date  Bicuspid aortic valve   
 leaky valve  Fibroid uterus  Hypertension  Obstructive sleep apnea on CPAP   Osteoporosis   
 osteopenia now per pt per last 2 bone densities  Thyroid disease   
 hx of synthroid use/thyroid bloodwork normal now PSH: 
Past Surgical History:  
Procedure Laterality Date  COLONOSCOPY N/A 2018 COLONOSCOPY performed by Titi Otoole. Malcolm Rivas MD at [facility] ENDOSCOPY  ENDOSCOPY, COLON, DIAGNOSTIC    
 HX GYN    
 left ovarian cyst removed  HX GYN  2018 D&C  
 HX KNEE ARTHROSCOPY Bilateral   
 torn meniscus  HX ORTHOPAEDIC    
 cyst left hand  HX ORTHOPAEDIC    
 bakers cyst  
 HX TONSILLECTOMY Allergies: Allergies Allergen Reactions  Penicillin G Hives Home Medications: 
Prior to Admission Medications Prescriptions Last Dose Informant Patient Reported? Taking? ASPIRIN PO 2019  Yes Yes Sig: Take 81 mg by mouth every evening. coated BIOTIN PO 2019 at Unknown time  Yes Yes Sig: Take 10,000 mcg by mouth daily. CALCIUM CARBONATE/VITAMIN D3 (CALCIUM + D PO) 2019 at Unknown time  Yes Yes Sig: Take 600 mg by mouth two (2) times a day. cetirizine (ZYRTEC) 10 mg tablet 2019 at Unknown time  No Yes Sig: Take 1 Tab by mouth daily. fluticasone propionate (FLONASE) 50 mcg/actuation nasal spray 2019 at Unknown time  No Yes Sig: USE 2 SPRAYS IN EACH NOSTRIL DAILY AS NEEDED FOR RHINITIS  
lisinopril (PRINIVIL, ZESTRIL) 5 mg tablet 2019 at Unknown time  Yes Yes Sig: Take 5 mg by mouth every evening.  
multivit-min/iron/folic/lutein (CENTRUM SILVER WOMEN PO) 2019 at Unknown time  Yes Yes Sig: Take 1 Tab by mouth daily. pantoprazole (PROTONIX) 40 mg tablet 5/13/2019 at Unknown time  No Yes Sig: TAKE 1 TABLET BY MOUTH DAILY  
raloxifene (EVISTA) 60 mg tablet 5/19/2019 at Unknown time  Yes Yes Sig: Take 60 mg by mouth daily. Indications: Post-Menopausal Osteoporosis Prevention Facility-Administered Medications: None Hospital Medications: 
Current Facility-Administered Medications Medication Dose Route Frequency  0.9% sodium chloride infusion  100 mL/hr IntraVENous CONTINUOUS  
 sodium chloride (NS) flush 5-40 mL  5-40 mL IntraVENous Q8H  
 sodium chloride (NS) flush 5-40 mL  5-40 mL IntraVENous PRN  
 midazolam (VERSED) injection 0.25-10 mg  0.25-10 mg IntraVENous Multiple  fentaNYL citrate (PF) injection 100 mcg  100 mcg IntraVENous Multiple  naloxone (NARCAN) injection 0.4 mg  0.4 mg IntraVENous Multiple  flumazenil (ROMAZICON) 0.1 mg/mL injection 0.2 mg  0.2 mg IntraVENous Multiple  simethicone (MYLICON) 13LN/9.5JP oral drops 80 mg  1.2 mL Oral Multiple  diphenhydrAMINE (BENADRYL) injection 50 mg  50 mg IntraVENous ONCE  
 atropine injection 0.5 mg  0.5 mg IntraVENous ONCE PRN  
 EPINEPHrine (ADRENALIN) 0.1 mg/mL syringe 1 mg  1 mg Endoscopically ONCE PRN Social History: 
Social History Tobacco Use  Smoking status: Never Smoker  Smokeless tobacco: Never Used Substance Use Topics  Alcohol use: Yes Alcohol/week: 0.0 oz  
  Comment: occasionally - very little Family History: 
Family History Problem Relation Age of Onset  Heart Disease Mother  Cancer Mother 80  
     colorectal cancer  Cancer Father   
     neck  No Known Problems Sister  No Known Problems Sister  Cancer Maternal Grandmother   
     ?colon cancer  Cancer Paternal Grandmother   
     ?colon cancer  Cancer Other   
     breast - cousins  Heart Disease Other   
     cousins Review of Systems: Constitutional: negative fever, negative chills, negative weight loss Eyes:   negative visual changes ENT:   negative sore throat, tongue or lip swelling Respiratory:  negative cough, negative dyspnea Cards:  negative for chest pain, palpitations, lower extremity edema GI:   See HPI 
:  negative for frequency, dysuria Integument:  negative for rash and pruritus Heme:  negative for easy bruising and gum/nose bleeding Musculoskel: negative for myalgias,  back pain and muscle weakness Neuro: negative for headaches, dizziness, vertigo Psych:  negative for feelings of anxiety, depression Objective:  
 
Patient Vitals for the past 8 hrs: 
 BP Temp Pulse Resp SpO2 Height Weight 05/20/19 1527 112/46 97.7 °F (36.5 °C) 66 16 100 % 5' 1\" (1.549 m) 67.6 kg (149 lb) No intake/output data recorded. No intake/output data recorded. EXAM:   
 NEURO-a&o HEENT-wnl LUNGS-clear COR-regular rate and rhythym ABD-soft , no tenderness, no rebound, good bs EXT-no edema Data Review No results for input(s): WBC, HGB, HCT, PLT, HGBEXT, HCTEXT, PLTEXT in the last 72 hours. No results for input(s): NA, K, CL, CO2, BUN, CREA, GLU, PHOS, CA in the last 72 hours. No results for input(s): SGOT, GPT, AP, TBIL, TP, ALB, GLOB, GGT, AML, LPSE in the last 72 hours. No lab exists for component: AMYP, HLPSE No results for input(s): INR, PTP, APTT in the last 72 hours. No lab exists for component: INREXT Assessment:  
· Epigastric pain Patient Active Problem List  
Diagnosis Code  Osteoporosis M81.0  Bicuspid aortic valve Q23.1  
 H/O: hypothyroidism Z86.39  
 Essential hypertension I10  
 Encounter for medication monitoring Z51.81 Plan:  
· Endoscopic procedure with MAC Signed By: Dede Shall. Michelene Kehr, MD   
 5/20/2019  3:59 PM

## 2019-05-20 NOTE — DISCHARGE INSTRUCTIONS
[address] [facility]  [address] [facility], [address] [place] [address]    EGD DISCHARGE INSTRUCTIONS    Cora Hernandez  [id number]  1950    Discomfort:  Sore throat- throat lozenges or warm salt water gargle  redness at IV site- apply warm compress to area; if redness or soreness persist- contact your physician  Gaseous discomfort- walking, belching will help relieve any discomfort  You may not operate a vehicle for 12 hours  You may not engage in an occupation involving machinery or appliances for rest of today  You may not drink alcoholic beverages for at least 12 hours  Avoid making any critical decisions for at least 24 hour  DIET  You may resume your regular diet - however -  remember your colon is empty and a heavy meal will produce gas. Avoid these foods:  vegetables, fried / greasy foods, carbonated drinks    ACTIVITY  You may resume your normal daily activities   Spend the remainder of the day resting -  avoid any strenuous activity. CALL M.D. ANY SIGN OF   Increasing pain, nausea, vomiting  Abdominal distension (swelling)  New increased bleeding (oral or rectal)  Fever (chills)  Pain in chest area  Bloody discharge from nose or mouth  Shortness of breath    Follow-up Instructions:   Call Dr. Vanessa Braun for any questions or problems. Telephone # [phone]    ENDOSCOPY FINDINGS:   Your endoscopy showed mild gastritis, which was biopsied. Please continue Protonix. We will contact you about the results and the next step in the plan. Signed By: Calderon Joya. Walton Boxer, MD     5/20/2019  4:09 PM     Patient Education        Gastritis: Care Instructions  Your Care Instructions    Gastritis is a sore and upset stomach. It happens when something irritates the stomach lining. Many things can cause it. These include an infection such as the flu or something you ate or drank. Medicines or a sore on the lining of the stomach (ulcer) also can cause it. Your belly may bloat and ache.  You may belch, vomit, and feel sick to your stomach. You should be able to relieve the problem by taking medicine. And it may help to change your diet. If gastritis lasts, your doctor may prescribe medicine. Follow-up care is a key part of your treatment and safety. Be sure to make and go to all appointments, and call your doctor if you are having problems. It's also a good idea to know your test results and keep a list of the medicines you take. How can you care for yourself at home? · If your doctor prescribed antibiotics, take them as directed. Do not stop taking them just because you feel better. You need to take the full course of antibiotics. · Be safe with medicines. If your doctor prescribed medicine to decrease stomach acid, take it as directed. Call your doctor if you think you are having a problem with your medicine. · Do not take any other medicine, including over-the-counter pain relievers, without talking to your doctor first.  · If your doctor recommends over-the-counter medicine to reduce stomach acid, such as Pepcid AC, Prilosec, Tagamet HB, or Zantac 75, follow the directions on the label. · Drink plenty of fluids (enough so that your urine is light yellow or clear like water) to prevent dehydration. Choose water and other caffeine-free clear liquids. If you have kidney, heart, or liver disease and have to limit fluids, talk with your doctor before you increase the amount of fluids you drink. · Limit how much alcohol you drink. · Avoid coffee, tea, cola drinks, chocolate, and other foods with caffeine. They increase stomach acid. When should you call for help? Call 911 anytime you think you may need emergency care.  For example, call if:    · You vomit blood or what looks like coffee grounds.     · You pass maroon or very bloody stools.    Call your doctor now or seek immediate medical care if:    · You start breathing fast and have not produced urine in the last 8 hours.     · You cannot keep fluids down.    Watch closely for changes in your health, and be sure to contact your doctor if:    · You do not get better as expected. Where can you learn more? Go to http://august-mariela.info/. Enter [id number] in the search box to learn more about \"Gastritis: Care Instructions. \"  Current as of: March 27, 2018  Content Version: 11.9  © 5781-6548 [employer]. Care instructions adapted under license by [employer] (which disclaims liability or warranty for this information). If you have questions about a medical condition or this instruction, always ask your healthcare professional. [place] [address] any warranty or liability for your use of this information.

## 2019-05-20 NOTE — ROUTINE PROCESS
Cameron Rosas 1950 
[id number] Situation: 
Verbal report received from: KURT Hamilton Procedure: Procedure(s): ESOPHAGOGASTRODUODENOSCOPY (EGD) ESOPHAGOGASTRODUODENAL (EGD) BIOPSY Background: 
 
Preoperative diagnosis: DYSPHAGIA Postoperative diagnosis: 1. Mild gastritis :  Dr. Brando Doss Assistant(s): Endoscopy Technician-1: Norris Higginbotham 
Endoscopy RN-1: Renetta Seo Specimens:  
ID Type Source Tests Collected by Time Destination 1 : duodenal bx Preservative Duodenum  Elson Duverney., MD 5/20/2019 1606 Pathology 2 : gastric bx Preservative Gastric  Elson Duverney., MD 5/20/2019 1606 Pathology H. Pylori  no Assessment: 
Intra-procedure medications Anesthesia gave intra-procedure sedation and medications, see anesthesia flow sheet yes Intravenous fluids: NS@ Horris Harms Vital signs stable Abdominal assessment: round and soft Recommendation: 
Discharge patient per MD order. Family or Friend Permission to share finding with family or friend yes

## 2019-11-11 RX ORDER — CETIRIZINE HCL 10 MG
TABLET ORAL
Qty: 90 TAB | Refills: 4 | Status: SHIPPED | OUTPATIENT
Start: 2019-11-11 | End: 2021-01-18

## 2020-10-30 ENCOUNTER — OFFICE VISIT (OUTPATIENT)
Dept: FAMILY MEDICINE CLINIC | Age: 70
End: 2020-10-30
Payer: MEDICARE

## 2020-10-30 VITALS
HEIGHT: 61 IN | RESPIRATION RATE: 18 BRPM | WEIGHT: 156.4 LBS | DIASTOLIC BLOOD PRESSURE: 72 MMHG | OXYGEN SATURATION: 98 % | TEMPERATURE: 97.1 F | SYSTOLIC BLOOD PRESSURE: 137 MMHG | BODY MASS INDEX: 29.53 KG/M2 | HEART RATE: 69 BPM

## 2020-10-30 DIAGNOSIS — Z51.81 ENCOUNTER FOR MEDICATION MONITORING: ICD-10-CM

## 2020-10-30 DIAGNOSIS — I10 ESSENTIAL HYPERTENSION: ICD-10-CM

## 2020-10-30 DIAGNOSIS — E78.00 HYPERCHOLESTEROLEMIA: ICD-10-CM

## 2020-10-30 DIAGNOSIS — Z00.00 MEDICARE ANNUAL WELLNESS VISIT, SUBSEQUENT: Primary | ICD-10-CM

## 2020-10-30 DIAGNOSIS — Z79.82 LONG TERM CURRENT USE OF ASPIRIN: ICD-10-CM

## 2020-10-30 DIAGNOSIS — Z12.11 ENCOUNTER FOR SCREENING FECAL OCCULT BLOOD TESTING: ICD-10-CM

## 2020-10-30 DIAGNOSIS — Q23.1 BICUSPID AORTIC VALVE: ICD-10-CM

## 2020-10-30 DIAGNOSIS — Z23 NEEDS FLU SHOT: ICD-10-CM

## 2020-10-30 LAB
HEMOCCULT STL QL: NEGATIVE
VALID INTERNAL CONTROL?: YES

## 2020-10-30 PROCEDURE — G8754 DIAS BP LESS 90: HCPCS | Performed by: FAMILY MEDICINE

## 2020-10-30 PROCEDURE — 3017F COLORECTAL CA SCREEN DOC REV: CPT | Performed by: FAMILY MEDICINE

## 2020-10-30 PROCEDURE — G8752 SYS BP LESS 140: HCPCS | Performed by: FAMILY MEDICINE

## 2020-10-30 PROCEDURE — G9899 SCRN MAM PERF RSLTS DOC: HCPCS | Performed by: FAMILY MEDICINE

## 2020-10-30 PROCEDURE — G0463 HOSPITAL OUTPT CLINIC VISIT: HCPCS | Performed by: FAMILY MEDICINE

## 2020-10-30 PROCEDURE — G8419 CALC BMI OUT NRM PARAM NOF/U: HCPCS | Performed by: FAMILY MEDICINE

## 2020-10-30 PROCEDURE — 1090F PRES/ABSN URINE INCON ASSESS: CPT | Performed by: FAMILY MEDICINE

## 2020-10-30 PROCEDURE — 1101F PT FALLS ASSESS-DOCD LE1/YR: CPT | Performed by: FAMILY MEDICINE

## 2020-10-30 PROCEDURE — 90694 VACC AIIV4 NO PRSRV 0.5ML IM: CPT

## 2020-10-30 PROCEDURE — 99213 OFFICE O/P EST LOW 20 MIN: CPT | Performed by: FAMILY MEDICINE

## 2020-10-30 PROCEDURE — G8432 DEP SCR NOT DOC, RNG: HCPCS | Performed by: FAMILY MEDICINE

## 2020-10-30 PROCEDURE — G8536 NO DOC ELDER MAL SCRN: HCPCS | Performed by: FAMILY MEDICINE

## 2020-10-30 PROCEDURE — 82272 OCCULT BLD FECES 1-3 TESTS: CPT | Performed by: FAMILY MEDICINE

## 2020-10-30 PROCEDURE — G0439 PPPS, SUBSEQ VISIT: HCPCS | Performed by: FAMILY MEDICINE

## 2020-10-30 PROCEDURE — G8427 DOCREV CUR MEDS BY ELIG CLIN: HCPCS | Performed by: FAMILY MEDICINE

## 2020-10-30 RX ORDER — OXYCODONE AND ACETAMINOPHEN 5; 325 MG/1; MG/1
1 TABLET ORAL
COMMUNITY
Start: 2020-10-27 | End: 2020-10-30 | Stop reason: ALTCHOICE

## 2020-10-30 RX ORDER — DESOXIMETASONE 2.5 MG/G
CREAM TOPICAL
COMMUNITY
End: 2020-10-30 | Stop reason: ALTCHOICE

## 2020-10-30 RX ORDER — IBUPROFEN 800 MG/1
800 TABLET ORAL
COMMUNITY
Start: 2020-10-08

## 2020-10-30 NOTE — PROGRESS NOTES
HISTORY OF PRESENT ILLNESS  Wil Arango is a 79 y.o. female. HPI   Follow up on chronic medical problems. Overall feeling well. Doing the precautionary measures at home to reduce risks of exposure COVID19. Also wearing mask when she is going out. No known sick contacts or known exposure to [address]. BP follow up:  Has borderline hypertension and bicuspid aortic valve followed by cardiology. Had follow up with cardiology for next month. Compliant w/ medication, low salt diet, and exercise. BP home monitoring 130s/70s. States that her BP is always higher at doctor visit. No swelling, headache or dizziness. No chest pain, SOB, palpitations      Patient Active Problem List   Diagnosis Code    Osteoporosis M81.0    Bicuspid aortic valve Q23.1    H/O: hypothyroidism Z86.39    Essential hypertension I10    Encounter for medication monitoring Z51.81       Current Outpatient Medications   Medication Sig Dispense Refill    cetirizine (ZYRTEC) 10 mg tablet TAKE 1 TABLET DAILY 90 Tab 4    multivit-min/iron/folic/lutein (CENTRUM SILVER WOMEN PO) Take 1 Tab by mouth daily.  ASPIRIN PO Take 81 mg by mouth every evening. coated      pantoprazole (PROTONIX) 40 mg tablet TAKE 1 TABLET BY MOUTH DAILY 30 Tab 11    fluticasone propionate (FLONASE) 50 mcg/actuation nasal spray USE 2 SPRAYS IN EACH NOSTRIL DAILY AS NEEDED FOR RHINITIS 3 Bottle 3    lisinopril (PRINIVIL, ZESTRIL) 5 mg tablet Take 5 mg by mouth every evening.  raloxifene (EVISTA) 60 mg tablet Take 60 mg by mouth daily. Indications: Post-Menopausal Osteoporosis Prevention      BIOTIN PO Take 10,000 mcg by mouth daily.  CALCIUM CARBONATE/VITAMIN D3 (CALCIUM + D PO) Take 600 mg by mouth two (2) times a day.          Allergies   Allergen Reactions    Penicillin G Hives       Past Medical History:   Diagnosis Date    Bicuspid aortic valve     leaky valve    Fibroid uterus     Hypertension     Obstructive sleep apnea on CPAP 2017  Osteoporosis     osteopenia now per pt per last 2 bone densities    Thyroid disease     hx of synthroid use/thyroid bloodwork normal now       Past Surgical History:   Procedure Laterality Date    COLONOSCOPY N/A 5/7/2018    COLONOSCOPY performed by Monica Lyons. Monserrat Fulton MD at [facility] ENDOSCOPY    ENDOSCOPY, COLON, DIAGNOSTIC      HX GYN      left ovarian cyst removed    HX GYN  12/2018    D&C    HX KNEE ARTHROSCOPY Bilateral     torn meniscus    HX ORTHOPAEDIC      cyst left hand    HX ORTHOPAEDIC      bakers cyst    HX TONSILLECTOMY         Family History   Problem Relation Age of Onset    Heart Disease Mother     Cancer Mother 80        colorectal cancer    Cancer Father         neck    No Known Problems Sister     No Known Problems Sister     Cancer Maternal Grandmother         ?colon cancer    Cancer Paternal Grandmother         ?colon cancer    Cancer Other         breast - cousins    Heart Disease Other         cousins       Social History     Tobacco Use    Smoking status: Never Smoker    Smokeless tobacco: Never Used   Substance Use Topics    Alcohol use:  Yes     Alcohol/week: 0.0 standard drinks     Comment: occasionally - very little        Lab Results   Component Value Date/Time    WBC 7.2 02/28/2018 10:21 AM    HGB 12.9 02/28/2018 10:21 AM    HCT 37.8 02/28/2018 10:21 AM    PLATELET 172 42/56/8807 10:21 AM    MCV 91 02/28/2018 10:21 AM     Lab Results   Component Value Date/Time    Cholesterol, total 221 (H) 02/28/2018 10:21 AM    HDL Cholesterol 81 02/28/2018 10:21 AM    LDL, calculated 124 (H) 02/28/2018 10:21 AM    Triglyceride 80 02/28/2018 10:21 AM     Lab Results   Component Value Date/Time    TSH 1.890 02/28/2018 10:21 AM    T4, Free 1.32 02/22/2017 11:53 AM      Lab Results   Component Value Date/Time    Sodium 141 02/28/2018 10:21 AM    Potassium 4.6 02/28/2018 10:21 AM    Chloride 99 02/28/2018 10:21 AM    CO2 26 02/28/2018 10:21 AM    Glucose 98 02/28/2018 10:21 AM    BUN 19 02/28/2018 10:21 AM    Creatinine 0.92 02/28/2018 10:21 AM    BUN/Creatinine ratio 21 02/28/2018 10:21 AM    GFR est AA 75 02/28/2018 10:21 AM    GFR est non-AA 65 02/28/2018 10:21 AM    Calcium 10.7 (H) 02/28/2018 10:21 AM    Bilirubin, total 0.4 02/28/2018 10:21 AM    ALT (SGPT) 16 02/28/2018 10:21 AM    Alk. phosphatase 53 02/28/2018 10:21 AM    Protein, total 7.2 02/28/2018 10:21 AM    Albumin 4.6 02/28/2018 10:21 AM    A-G Ratio 1.8 02/28/2018 10:21 AM      Lab Results   Component Value Date/Time    Hemoglobin A1c 5.8 (H) 01/07/2014 11:52 AM    Hemoglobin A1c (POC) 5.3 01/26/2016 10:30 AM         Review of Systems   Constitutional: Negative for malaise/fatigue. HENT: Negative for congestion. Eyes: Negative for blurred vision. Respiratory: Negative for cough and shortness of breath. Cardiovascular: Negative for chest pain, palpitations and leg swelling. Gastrointestinal: Negative for abdominal pain, constipation and heartburn. Genitourinary: Negative for dysuria, frequency and urgency. Musculoskeletal: Negative for back pain and joint pain. Neurological: Negative for dizziness, tingling and headaches. Endo/Heme/Allergies: Negative for environmental allergies. Psychiatric/Behavioral: Negative for depression. The patient does not have insomnia. Physical Exam  Vitals signs and nursing note reviewed. Constitutional:       Appearance: Normal appearance. She is well-developed. Comments: /72 (BP 1 Location: Left arm, BP Patient Position: Sitting)   Pulse 69   Temp 97.1 °F (36.2 °C) (Temporal)   Resp 18   Ht 5' 1\" (1.549 m)   Wt 156 lb 6.4 oz (70.9 kg)   LMP 01/01/2004   SpO2 98%   BMI 29.55 kg/m²    HENT:      Right Ear: Tympanic membrane and ear canal normal.      Left Ear: Tympanic membrane and ear canal normal.      Nose: No mucosal edema or rhinorrhea. Neck:      Musculoskeletal: Normal range of motion and neck supple. Thyroid: No thyromegaly.       Vascular: No carotid bruit. Cardiovascular:      Rate and Rhythm: Normal rate and regular rhythm. Pulses: Normal pulses. Heart sounds: Normal heart sounds. No gallop. Pulmonary:      Effort: Pulmonary effort is normal.      Breath sounds: Normal breath sounds. Chest:      Breasts:         Right: Normal.         Left: Normal.   Abdominal:      General: Bowel sounds are normal.      Palpations: Abdomen is soft. There is no mass. Tenderness: There is no abdominal tenderness. Genitourinary:     Rectum: Guaiac result negative. Musculoskeletal: Normal range of motion. General: No swelling. Lymphadenopathy:      Cervical: No cervical adenopathy. Upper Body:      Right upper body: No supraclavicular, axillary or pectoral adenopathy. Left upper body: No supraclavicular, axillary or pectoral adenopathy. Skin:     General: Skin is warm and dry. Neurological:      General: No focal deficit present. Mental Status: She is alert and oriented to person, place, and time. Psychiatric:         Mood and Affect: Mood normal.         ASSESSMENT and PLAN  Diagnoses and all orders for this visit:    1. Medicare annual wellness visit, subsequent    2. Essential hypertension  Stable     3. Hypercholesterolemia  -     LIPID PANEL; Future    4. Bicuspid aortic valve  As per cardiology    5. Encounter for medication monitoring  -     CBC W/O DIFF; Future  -     METABOLIC PANEL, COMPREHENSIVE; Future    6. Needs flu shot  -     FLU (FLUAD QUAD INFLUENZA VACCINE,QUAD,ADJUVANTED)          reviewed diet, exercise and weight control  cardiovascular risk and specific lipid/LDL goals reviewed  reviewed medications and side effects in detail      I have discussed diagnosis listed in this note with pt and/or family. I have discussed treatment plans and options and the risk/benefit analysis of those options, including safe use of medications and possible medication side effects.    Through the use of shared decision making we have agreed to the above plan. The patient has received an after-visit summary and questions were answered concerning future plans and follow up. Advise pt of any urgent changes then to proceed to the ER.

## 2020-10-30 NOTE — PROGRESS NOTES
Order placed for flu injection per Verbal Order from Dr. Almendarez on 10/30/2020 due to need    Flu injection given in left arm 0.5 ml IM, no reaction noted.

## 2020-10-30 NOTE — PROGRESS NOTES
This is a Subsequent Medicare Annual Wellness Exam (AWV) (Performed 12 months after IPPE or effective date of Medicare Part B enrollment)    I have reviewed the patient's medical history in detail and updated the computerized patient record. History     Patient Active Problem List   Diagnosis Code    Osteoporosis M81.0    Bicuspid aortic valve Q23.1    H/O: hypothyroidism Z86.39    Essential hypertension I10    Encounter for medication monitoring Z51.81       Current Outpatient Medications   Medication Sig Dispense Refill    cetirizine (ZYRTEC) 10 mg tablet TAKE 1 TABLET DAILY 90 Tab 4    multivit-min/iron/folic/lutein (CENTRUM SILVER WOMEN PO) Take 1 Tab by mouth daily.  ASPIRIN PO Take 81 mg by mouth every evening. coated      pantoprazole (PROTONIX) 40 mg tablet TAKE 1 TABLET BY MOUTH DAILY 30 Tab 11    fluticasone propionate (FLONASE) 50 mcg/actuation nasal spray USE 2 SPRAYS IN EACH NOSTRIL DAILY AS NEEDED FOR RHINITIS 3 Bottle 3    lisinopril (PRINIVIL, ZESTRIL) 5 mg tablet Take 5 mg by mouth every evening.  raloxifene (EVISTA) 60 mg tablet Take 60 mg by mouth daily. Indications: Post-Menopausal Osteoporosis Prevention      BIOTIN PO Take 10,000 mcg by mouth daily.  CALCIUM CARBONATE/VITAMIN D3 (CALCIUM + D PO) Take 600 mg by mouth two (2) times a day. Allergies   Allergen Reactions    Penicillin G Hives       Past Medical History:   Diagnosis Date    Bicuspid aortic valve     leaky valve    Fibroid uterus     Hypertension     Obstructive sleep apnea on CPAP 2017    Osteoporosis     osteopenia now per pt per last 2 bone densities    Thyroid disease     hx of synthroid use/thyroid bloodwork normal now       Past Surgical History:   Procedure Laterality Date    COLONOSCOPY N/A 5/7/2018    COLONOSCOPY performed by Mandy Rangel.  Destiny Guzmán MD at [facility] ENDOSCOPY    ENDOSCOPY, COLON, DIAGNOSTIC      HX GYN      left ovarian cyst removed    HX GYN  12/2018    D&C    HX KNEE ARTHROSCOPY Bilateral     torn meniscus    HX ORTHOPAEDIC      cyst left hand    HX ORTHOPAEDIC      bakers cyst    HX TONSILLECTOMY         Family History   Problem Relation Age of Onset    Heart Disease Mother     Cancer Mother 80        colorectal cancer    Cancer Father         neck    No Known Problems Sister     No Known Problems Sister     Cancer Maternal Grandmother         ?colon cancer    Cancer Paternal Grandmother         ?colon cancer    Cancer Other         breast - cousins    Heart Disease Other         cousins       Social History     Tobacco Use    Smoking status: Never Smoker    Smokeless tobacco: Never Used   Substance Use Topics    Alcohol use: Yes     Alcohol/week: 0.0 standard drinks     Comment: occasionally - very little         Depression Risk Factor Screening:     PHQ over the last two weeks    Little interest or pleasure in doing things Not at all   Feeling down, depressed or hopeless Not at all   Total Score PHQ 2 0     Alcohol Risk Factor Screening: You do not drink alcohol or very rarely. Functional Ability and Level of Safety:   Hearing Loss  Hearing is good. Activities of Daily Living  The home contains: no safety equipment. Patient does total self care    Fall RiskFall Risk Assessment, last 12 mths    Able to walk? Yes   Fall in past 12 months? No     Functional Ability:   Does the patient exhibit a steady gait? yes    How long did it take the patient to get up and walk from a sitting position? seconds    Is the patient self reliant? (ie can do own laundry, meals, household chores)  yes   Does the patient handle his/her own medications? yes   Does the patient handle his/her own money? yes   Is the patients home safe (ie good lighting, handrails on stairs and bath, etc.)? yes   Did you notice or did patient express any hearing difficulties? no   Did you notice or did patient express any vision difficulties?   no        Advance Care Planning:   Patient was offered the opportunity to discuss advance care planning:  yes    Does patient have an Advance Directive:  no   If no, did you provide information on Caring Connections? yes      Abuse Screen  Patient is not abused    Cognitive Screening   Evaluation of Cognitive Function:  Has your family/caregiver stated any concerns about your memory: no      Patient Care Team   Patient Care Team:  Trish Patel MD as PCP - General    Assessment/Plan   Education and counseling provided:  Are appropriate based on today's review and evaluation  End-of-Life planning (with patient's consent)    ASSESSMENT and PLAN    Medicare Annual Wellness  Continue current treatment plan. Continue annual follow up. I have discussed diagnosis listed in this note with pt and/or family. I have discussed treatment plans and options and the risk/benefit analysis of those options, including safe use of medications and possible medication side effects. Through the use of shared decision making we have agreed to the above plan. The patient has received an after-visit summary and questions were answered concerning future plans and follow up. Advise pt of any urgent changes then to proceed to the ER.

## 2020-10-30 NOTE — PROGRESS NOTES
Chief Complaint   Patient presents with   [facility] Annual Wellness Visit     Medicare wellness             Mammogram 1/28/2020    Colonoscopy 5/7/2018 by Dr. Ashutosh Alegria- repeat in 5 years    Patient states she had an eye exam 2/2019 by Dr. Cesar Vazquez. 1. Have you been to the ER, urgent care clinic since your last visit? Hospitalized since your last visit? No    2. Have you seen or consulted any other health care providers outside of the [address] [place] [address] since your last visit? Include any pap smears or colon screening.  Yes mammogram 1/30/2019

## 2020-11-10 ENCOUNTER — HOSPITAL ENCOUNTER (OUTPATIENT)
Dept: LAB | Age: 70
Discharge: HOME | End: 2020-11-10
Payer: MEDICARE

## 2020-11-10 PROCEDURE — 80061 LIPID PANEL: CPT

## 2020-11-10 PROCEDURE — 36415 COLL VENOUS BLD VENIPUNCTURE: CPT

## 2020-11-10 PROCEDURE — 85027 COMPLETE CBC AUTOMATED: CPT

## 2020-11-10 PROCEDURE — 80053 COMPREHEN METABOLIC PANEL: CPT

## 2020-11-11 LAB
ALBUMIN SERPL-MCNC: 4.7 G/DL (ref 3.8–4.8)
ALBUMIN/GLOB SERPL: 2 {RATIO} (ref 1.2–2.2)
ALP SERPL-CCNC: 73 IU/L (ref 39–117)
ALT SERPL-CCNC: 17 IU/L (ref 0–32)
AST SERPL-CCNC: 20 IU/L (ref 0–40)
BILIRUB SERPL-MCNC: 0.4 MG/DL (ref 0–1.2)
BUN SERPL-MCNC: 17 MG/DL (ref 8–27)
BUN/CREAT SERPL: 18 (ref 12–28)
CALCIUM SERPL-MCNC: 10 MG/DL (ref 8.7–10.3)
CHLORIDE SERPL-SCNC: 105 MMOL/L (ref 96–106)
CHOLEST SERPL-MCNC: 236 MG/DL (ref 100–199)
CO2 SERPL-SCNC: 25 MMOL/L (ref 20–29)
CREAT SERPL-MCNC: 0.92 MG/DL (ref 0.57–1)
ERYTHROCYTE [DISTWIDTH] IN BLOOD BY AUTOMATED COUNT: 13.2 % (ref 11.7–15.4)
GLOBULIN SER CALC-MCNC: 2.4 G/DL (ref 1.5–4.5)
GLUCOSE SERPL-MCNC: 95 MG/DL (ref 65–99)
HCT VFR BLD AUTO: 37.5 % (ref 34–46.6)
HDLC SERPL-MCNC: 73 MG/DL
HGB BLD-MCNC: 12.3 G/DL (ref 11.1–15.9)
INTERPRETATION, 910389: NORMAL
LDLC SERPL CALC-MCNC: 144 MG/DL (ref 0–99)
MCH RBC QN AUTO: 30.4 PG (ref 26.6–33)
MCHC RBC AUTO-ENTMCNC: 32.8 G/DL (ref 31.5–35.7)
MCV RBC AUTO: 93 FL (ref 79–97)
PLATELET # BLD AUTO: 208 X10E3/UL (ref 150–450)
POTASSIUM SERPL-SCNC: 4.4 MMOL/L (ref 3.5–5.2)
PROT SERPL-MCNC: 7.1 G/DL (ref 6–8.5)
RBC # BLD AUTO: 4.04 X10E6/UL (ref 3.77–5.28)
SODIUM SERPL-SCNC: 142 MMOL/L (ref 134–144)
TRIGL SERPL-MCNC: 110 MG/DL (ref 0–149)
VLDLC SERPL CALC-MCNC: 19 MG/DL (ref 5–40)
WBC # BLD AUTO: 7.1 X10E3/UL (ref 3.4–10.8)

## 2021-01-18 RX ORDER — CETIRIZINE HCL 10 MG
TABLET ORAL
Qty: 90 TAB | Refills: 3 | Status: SHIPPED | OUTPATIENT
Start: 2021-01-18 | End: 2022-02-21

## 2021-04-16 DIAGNOSIS — Z91.09 ENVIRONMENTAL ALLERGIES: ICD-10-CM

## 2021-04-16 RX ORDER — FLUTICASONE PROPIONATE 50 MCG
SPRAY, SUSPENSION (ML) NASAL
Qty: 3 BOTTLE | Refills: 3 | Status: SHIPPED | OUTPATIENT
Start: 2021-04-16 | End: 2022-07-26

## 2021-04-16 NOTE — TELEPHONE ENCOUNTER
Caller (if not patient): self     Relationship of caller (if not patient): n/a     Best contact number(s): [phone]      Name of medication and dosage if known:fluticasone propionate (FLONASE) 50 mcg/actuation nasal spray,desoximetasone (TOPICORT) 0.25 % topical cream                 Is patient out of this medication (yes/no): yes    90 or 30 day supply: 90     [facility]     Pharmacy listed in chart?(yes/no): july    Pharmacy phone number: n/a    Additional details to clarify the request: n/a

## 2021-07-20 RX ORDER — RALOXIFENE HYDROCHLORIDE 60 MG/1
60 TABLET, FILM COATED ORAL DAILY
Qty: 90 TABLET | Refills: 1 | Status: SHIPPED | OUTPATIENT
Start: 2021-07-20 | End: 2022-01-24

## 2021-11-02 ENCOUNTER — OFFICE VISIT (OUTPATIENT)
Dept: FAMILY MEDICINE CLINIC | Age: 71
End: 2021-11-02
Payer: MEDICARE

## 2021-11-02 VITALS
HEART RATE: 76 BPM | WEIGHT: 156 LBS | TEMPERATURE: 97.2 F | BODY MASS INDEX: 29.45 KG/M2 | OXYGEN SATURATION: 98 % | SYSTOLIC BLOOD PRESSURE: 150 MMHG | RESPIRATION RATE: 16 BRPM | DIASTOLIC BLOOD PRESSURE: 74 MMHG | HEIGHT: 61 IN

## 2021-11-02 DIAGNOSIS — Q23.1 BICUSPID AORTIC VALVE: ICD-10-CM

## 2021-11-02 DIAGNOSIS — I10 ESSENTIAL HYPERTENSION: ICD-10-CM

## 2021-11-02 DIAGNOSIS — E78.00 HYPERCHOLESTEROLEMIA: ICD-10-CM

## 2021-11-02 DIAGNOSIS — Z00.00 MEDICARE ANNUAL WELLNESS VISIT, SUBSEQUENT: Primary | ICD-10-CM

## 2021-11-02 DIAGNOSIS — Z51.81 ENCOUNTER FOR MEDICATION MONITORING: ICD-10-CM

## 2021-11-02 DIAGNOSIS — Z23 NEEDS FLU SHOT: ICD-10-CM

## 2021-11-02 DIAGNOSIS — M16.11 PRIMARY OSTEOARTHRITIS OF RIGHT HIP: ICD-10-CM

## 2021-11-02 LAB
ALBUMIN SERPL-MCNC: 3.8 G/DL (ref 3.5–5)
ALBUMIN/GLOB SERPL: 1.1 {RATIO} (ref 1.1–2.2)
ALP SERPL-CCNC: 65 U/L (ref 45–117)
ALT SERPL-CCNC: 23 U/L (ref 12–78)
ANION GAP SERPL CALC-SCNC: 2 MMOL/L (ref 5–15)
APPEARANCE UR: CLEAR
AST SERPL-CCNC: 12 U/L (ref 15–37)
BACTERIA URNS QL MICRO: NEGATIVE /HPF
BILIRUB SERPL-MCNC: 0.3 MG/DL (ref 0.2–1)
BILIRUB UR QL: NEGATIVE
BUN SERPL-MCNC: 18 MG/DL (ref 6–20)
BUN/CREAT SERPL: 21 (ref 12–20)
CALCIUM SERPL-MCNC: 9.9 MG/DL (ref 8.5–10.1)
CHLORIDE SERPL-SCNC: 111 MMOL/L (ref 97–108)
CHOLEST SERPL-MCNC: 226 MG/DL
CO2 SERPL-SCNC: 27 MMOL/L (ref 21–32)
COLOR UR: NORMAL
CREAT SERPL-MCNC: 0.85 MG/DL (ref 0.55–1.02)
EPITH CASTS URNS QL MICRO: NORMAL /LPF
ERYTHROCYTE [DISTWIDTH] IN BLOOD BY AUTOMATED COUNT: 12.8 % (ref 11.5–14.5)
GLOBULIN SER CALC-MCNC: 3.4 G/DL (ref 2–4)
GLUCOSE SERPL-MCNC: 100 MG/DL (ref 65–100)
GLUCOSE UR STRIP.AUTO-MCNC: NEGATIVE MG/DL
HCT VFR BLD AUTO: 37.9 % (ref 35–47)
HDLC SERPL-MCNC: 68 MG/DL
HDLC SERPL: 3.3 {RATIO} (ref 0–5)
HGB BLD-MCNC: 12.1 G/DL (ref 11.5–16)
HGB UR QL STRIP: NEGATIVE
HYALINE CASTS URNS QL MICRO: NORMAL /LPF (ref 0–5)
KETONES UR QL STRIP.AUTO: NEGATIVE MG/DL
LDLC SERPL CALC-MCNC: 142.2 MG/DL (ref 0–100)
LEUKOCYTE ESTERASE UR QL STRIP.AUTO: NEGATIVE
MCH RBC QN AUTO: 30.9 PG (ref 26–34)
MCHC RBC AUTO-ENTMCNC: 31.9 G/DL (ref 30–36.5)
MCV RBC AUTO: 96.7 FL (ref 80–99)
NITRITE UR QL STRIP.AUTO: NEGATIVE
NRBC # BLD: 0 K/UL (ref 0–0.01)
NRBC BLD-RTO: 0 PER 100 WBC
PH UR STRIP: 5 [PH] (ref 5–8)
PLATELET # BLD AUTO: 210 K/UL (ref 150–400)
PMV BLD AUTO: 10.5 FL (ref 8.9–12.9)
POTASSIUM SERPL-SCNC: 5 MMOL/L (ref 3.5–5.1)
PROT SERPL-MCNC: 7.2 G/DL (ref 6.4–8.2)
PROT UR STRIP-MCNC: NEGATIVE MG/DL
RBC # BLD AUTO: 3.92 M/UL (ref 3.8–5.2)
RBC #/AREA URNS HPF: NORMAL /HPF (ref 0–5)
SODIUM SERPL-SCNC: 140 MMOL/L (ref 136–145)
SP GR UR REFRACTOMETRY: 1.01 (ref 1–1.03)
TRIGL SERPL-MCNC: 79 MG/DL (ref ?–150)
UA: UC IF INDICATED,UAUC: NORMAL
UROBILINOGEN UR QL STRIP.AUTO: 0.2 EU/DL (ref 0.2–1)
VLDLC SERPL CALC-MCNC: 15.8 MG/DL
WBC # BLD AUTO: 5.5 K/UL (ref 3.6–11)
WBC URNS QL MICRO: NORMAL /HPF (ref 0–4)

## 2021-11-02 PROCEDURE — G8419 CALC BMI OUT NRM PARAM NOF/U: HCPCS | Performed by: FAMILY MEDICINE

## 2021-11-02 PROCEDURE — G8754 DIAS BP LESS 90: HCPCS | Performed by: FAMILY MEDICINE

## 2021-11-02 PROCEDURE — G8510 SCR DEP NEG, NO PLAN REQD: HCPCS | Performed by: FAMILY MEDICINE

## 2021-11-02 PROCEDURE — G0439 PPPS, SUBSEQ VISIT: HCPCS | Performed by: FAMILY MEDICINE

## 2021-11-02 PROCEDURE — G0463 HOSPITAL OUTPT CLINIC VISIT: HCPCS | Performed by: FAMILY MEDICINE

## 2021-11-02 PROCEDURE — G8427 DOCREV CUR MEDS BY ELIG CLIN: HCPCS | Performed by: FAMILY MEDICINE

## 2021-11-02 PROCEDURE — 90694 VACC AIIV4 NO PRSRV 0.5ML IM: CPT | Performed by: FAMILY MEDICINE

## 2021-11-02 PROCEDURE — G8753 SYS BP > OR = 140: HCPCS | Performed by: FAMILY MEDICINE

## 2021-11-02 PROCEDURE — G9899 SCRN MAM PERF RSLTS DOC: HCPCS | Performed by: FAMILY MEDICINE

## 2021-11-02 PROCEDURE — 1090F PRES/ABSN URINE INCON ASSESS: CPT | Performed by: FAMILY MEDICINE

## 2021-11-02 PROCEDURE — 3017F COLORECTAL CA SCREEN DOC REV: CPT | Performed by: FAMILY MEDICINE

## 2021-11-02 PROCEDURE — G8536 NO DOC ELDER MAL SCRN: HCPCS | Performed by: FAMILY MEDICINE

## 2021-11-02 PROCEDURE — 1101F PT FALLS ASSESS-DOCD LE1/YR: CPT | Performed by: FAMILY MEDICINE

## 2021-11-02 PROCEDURE — 99213 OFFICE O/P EST LOW 20 MIN: CPT | Performed by: FAMILY MEDICINE

## 2021-11-02 NOTE — PROGRESS NOTES
HISTORY OF PRESENT ILLNESS  Margo Copeland is a 70 y.o. female. HPI   Follow up on chronic medical problems. Overall feeling well. Doing the precautionary measures at home to reduce risks of exposure COVID19. Also wearing mask when she is going out. No known sick contacts or known exposure to Drew Mims. BP follow up:  Has hypertension and bicuspid aortic valve followed by cardiology. Had follow up with cardiology for this month. Compliant w/ medication, low salt diet, and exercise. BP home monitoring 130s/70s. States that her BP is always higher at doctor visit. No swelling, headache or dizziness. No chest pain, SOB, palpitations    C/o right hip pain for the past several weeks. Pain comes and goes but worse with walking. No injury noted. Has no swelling. Stiffness after sitting. Has taken motrin as needed for pain which does help. Pain is 5-6/10. Has no popping or giving away of the hip. Pain radiates to the groin and upper thigh. Patient Active Problem List   Diagnosis Code    Osteoporosis M81.0    Bicuspid aortic valve Q23.1    H/O: hypothyroidism Z86.39    Essential hypertension I10    Encounter for medication monitoring Z51.81       Current Outpatient Medications   Medication Sig Dispense Refill    raloxifene (EVISTA) 60 mg tablet Take 1 Tablet by mouth daily. Indications: post-menopausal osteoporosis prevention 90 Tablet 1    desoximetasone (TOPICORT) 0.25 % topical cream Apply  to affected area two (2) times daily as needed for Skin Irritation. 60 g 1    fluticasone propionate (FLONASE) 50 mcg/actuation nasal spray USE 2 SPRAYS IN EACH NOSTRIL DAILY AS NEEDED FOR RHINITIS 3 Bottle 3    cetirizine (ZYRTEC) 10 mg tablet TAKE 1 TABLET DAILY 90 Tab 3    ibuprofen (MOTRIN) 800 mg tablet Take 800 mg by mouth every six (6) hours as needed.  multivit-min/iron/folic/lutein (CENTRUM SILVER WOMEN PO) Take 1 Tab by mouth daily.       ASPIRIN PO Take 81 mg by mouth every evening. coated      lisinopril (PRINIVIL, ZESTRIL) 5 mg tablet Take 5 mg by mouth every evening.  BIOTIN PO Take 10,000 mcg by mouth daily.  CALCIUM CARBONATE/VITAMIN D3 (CALCIUM + D PO) Take 600 mg by mouth two (2) times a day. Allergies   Allergen Reactions    Penicillin G Hives         Past Medical History:   Diagnosis Date    Bicuspid aortic valve     leaky valve    Fibroid uterus     Hypertension     Obstructive sleep apnea on CPAP 2017    Osteoporosis     osteopenia now per pt per last 2 bone densities    Thyroid disease     hx of synthroid use/thyroid bloodwork normal now         Past Surgical History:   Procedure Laterality Date    COLONOSCOPY N/A 5/7/2018    COLONOSCOPY performed by Salomón Maldonado. Guido Renner MD [facility] ENDOSCOPY    ENDOSCOPY, COLON, DIAGNOSTIC      HX GYN      left ovarian cyst removed    HX GYN  12/2018, 8/2020    D&C    HX KNEE ARTHROSCOPY Bilateral     torn meniscus    HX ORTHOPAEDIC      cyst left hand    HX ORTHOPAEDIC      bakers cyst    HX ORTHOPAEDIC  10/28/2020    left knee- mensicus repair    HX TONSILLECTOMY           Family History   Problem Relation Age of Onset    Heart Disease Mother     Cancer Mother 80        colorectal cancer    Cancer Father         neck    No Known Problems Sister     No Known Problems Sister     Cancer Maternal Grandmother         ?colon cancer    Cancer Paternal Grandmother         ?colon cancer    Cancer Other         breast - cousins    Heart Disease Other         cousins       Social History     Tobacco Use    Smoking status: Never Smoker    Smokeless tobacco: Never Used   Substance Use Topics    Alcohol use:  Yes     Alcohol/week: 0.0 standard drinks     Comment: occasionally - very little        Lab Results   Component Value Date/Time    WBC 7.1 11/10/2020 08:37 AM    HGB 12.3 11/10/2020 08:37 AM    HCT 37.5 11/10/2020 08:37 AM    PLATELET 969 76/25/4029 08:37 AM    MCV 93 11/10/2020 08:37 AM     Lab Results Component Value Date/Time    Cholesterol, total 236 (H) 11/10/2020 08:37 AM    HDL Cholesterol 73 11/10/2020 08:37 AM    LDL, calculated 144 (H) 11/10/2020 08:37 AM    LDL, calculated 124 (H) 02/28/2018 10:21 AM    Triglyceride 110 11/10/2020 08:37 AM     Lab Results   Component Value Date/Time    TSH 1.890 02/28/2018 10:21 AM    T4, Free 1.32 02/22/2017 11:53 AM      Lab Results   Component Value Date/Time    Sodium 142 11/10/2020 08:37 AM    Potassium 4.4 11/10/2020 08:37 AM    Chloride 105 11/10/2020 08:37 AM    CO2 25 11/10/2020 08:37 AM    Glucose 95 11/10/2020 08:37 AM    BUN 17 11/10/2020 08:37 AM    Creatinine 0.92 11/10/2020 08:37 AM    BUN/Creatinine ratio 18 11/10/2020 08:37 AM    GFR est AA 73 11/10/2020 08:37 AM    GFR est non-AA 63 11/10/2020 08:37 AM    Calcium 10.0 11/10/2020 08:37 AM    Bilirubin, total 0.4 11/10/2020 08:37 AM    ALT (SGPT) 17 11/10/2020 08:37 AM    Alk. phosphatase 73 11/10/2020 08:37 AM    Protein, total 7.1 11/10/2020 08:37 AM    Albumin 4.7 11/10/2020 08:37 AM    A-G Ratio 2.0 11/10/2020 08:37 AM      Lab Results   Component Value Date/Time    Hemoglobin A1c 5.8 (H) 01/07/2014 11:52 AM    Hemoglobin A1c (POC) 5.3 01/26/2016 10:30 AM         Review of Systems   Constitutional: Negative for malaise/fatigue. HENT: Negative for congestion. Eyes: Negative for blurred vision. Respiratory: Negative for cough and shortness of breath. Cardiovascular: Negative for chest pain, palpitations and leg swelling. Gastrointestinal: Negative for abdominal pain, constipation and heartburn. Genitourinary: Negative for dysuria, frequency and urgency. Musculoskeletal: Negative for back pain and joint pain. Neurological: Negative for dizziness, tingling and headaches. Endo/Heme/Allergies: Negative for environmental allergies. Psychiatric/Behavioral: Negative for depression. The patient does not have insomnia. Physical Exam  Vitals and nursing note reviewed. Constitutional:       Appearance: Normal appearance. She is well-developed. Comments: BP (!) 150/74 (BP 1 Location: Right arm, BP Patient Position: Sitting)   Pulse 76   Temp 97.2 °F (36.2 °C) (Oral)   Resp 16   Ht 5' 1\" (1.549 m)   Wt 156 lb (70.8 kg)   LMP 01/01/2004   SpO2 98%   BMI 29.48 kg/m²       HENT:      Right Ear: Tympanic membrane and ear canal normal.      Left Ear: Tympanic membrane and ear canal normal.      Nose: No mucosal edema. Neck:      Thyroid: No thyromegaly. Vascular: No carotid bruit. Cardiovascular:      Rate and Rhythm: Normal rate and regular rhythm. Pulses: Normal pulses. Heart sounds: Normal heart sounds. No gallop. Pulmonary:      Effort: Pulmonary effort is normal.      Breath sounds: Normal breath sounds. Chest:      Breasts:         Right: Normal.         Left: Normal.   Abdominal:      General: Bowel sounds are normal.      Palpations: Abdomen is soft. There is no mass. Tenderness: There is no abdominal tenderness. Genitourinary:     Rectum: Guaiac result negative. Musculoskeletal:         General: No swelling. Normal range of motion. Cervical back: Normal range of motion and neck supple. Right hip: Tenderness and bony tenderness present. Normal range of motion. Normal strength. Right lower leg: No edema. Left lower leg: No edema. Lymphadenopathy:      Cervical: No cervical adenopathy. Upper Body:      Right upper body: No supraclavicular, axillary or pectoral adenopathy. Left upper body: No supraclavicular, axillary or pectoral adenopathy. Skin:     General: Skin is warm and dry. Neurological:      General: No focal deficit present. Mental Status: She is alert and oriented to person, place, and time. Psychiatric:         Mood and Affect: Mood normal.       ASSESSMENT and PLAN  Diagnoses and all orders for this visit:    1. Medicare annual wellness visit, subsequent    2.  Essential hypertension  Discussed sodium restriction, high k rich diet, maintaining ideal body weight and regular exercise program such as daily walking 30 min perday 4-5 times per week, as physiologic means to achieve blood pressure control. Medication compliance advised. 3. Hypercholesterolemia  -     LIPID PANEL; Future    4. Bicuspid aortic valve  Follow up with cardiology as planned    5. Primary osteoarthritis of right hip  Instructions for exercises given and reviewed with pt. Pt also to use heat to the area 3-4 times a day over the next several days until sx are improved. 6. Encounter for medication monitoring  -     METABOLIC PANEL, COMPREHENSIVE; Future  -     CBC W/O DIFF; Future  -     URINALYSIS W/ REFLEX CULTURE; Future    7. Needs flu shot  -     FLU (FLUAD QUAD INFLUENZA VACCINE,QUAD,ADJUVANTED)      reviewed diet, exercise and weight control  cardiovascular risk and specific lipid/LDL goals reviewed  reviewed medications and side effects in detail      I have discussed diagnosis listed in this note with pt and/or family. I have discussed treatment plans and options and the risk/benefit analysis of those options, including safe use of medications and possible medication side effects. Through the use of shared decision making we have agreed to the above plan. The patient has received an after-visit summary and questions were answered concerning future plans and follow up. Advise pt of any urgent changes then to proceed to the ER.

## 2021-11-02 NOTE — PROGRESS NOTES
Chief Complaint   Patient presents with    Annual Wellness Visit             Mammogram 1/20/2021    Colonoscopy 5/7/2018 by Dr. Kina Walter- repeat in 5 years    Verbal order received by Dr. Alfredo Narayanan dose flu vaccine IM. Pt received high dose flu vaccine IM in left deltoid without any difficulty. 1. Have you been to the ER, urgent care clinic since your last visit? Hospitalized since your last visit? No    2. Have you seen or consulted any other health care providers outside of the [address] [place] [address] since your last visit? Include any pap smears or colon screening.  Yes mammogram 1/30/2019

## 2021-11-02 NOTE — PROGRESS NOTES
This is a Subsequent Medicare Annual Wellness Exam (AWV) (Performed 12 months after IPPE or effective date of Medicare Part B enrollment)    I have reviewed the patient's medical history in detail and updated the computerized patient record. History     Patient Active Problem List   Diagnosis Code    Osteoporosis M81.0    Bicuspid aortic valve Q23.1    H/O: hypothyroidism Z86.39    Essential hypertension I10    Encounter for medication monitoring Z51.81       Current Outpatient Medications   Medication Sig Dispense Refill    raloxifene (EVISTA) 60 mg tablet Take 1 Tablet by mouth daily. Indications: post-menopausal osteoporosis prevention 90 Tablet 1    desoximetasone (TOPICORT) 0.25 % topical cream Apply  to affected area two (2) times daily as needed for Skin Irritation. 60 g 1    fluticasone propionate (FLONASE) 50 mcg/actuation nasal spray USE 2 SPRAYS IN EACH NOSTRIL DAILY AS NEEDED FOR RHINITIS 3 Bottle 3    cetirizine (ZYRTEC) 10 mg tablet TAKE 1 TABLET DAILY 90 Tab 3    ibuprofen (MOTRIN) 800 mg tablet Take 800 mg by mouth every six (6) hours as needed.  multivit-min/iron/folic/lutein (CENTRUM SILVER WOMEN PO) Take 1 Tab by mouth daily.  ASPIRIN PO Take 81 mg by mouth every evening. coated      lisinopril (PRINIVIL, ZESTRIL) 5 mg tablet Take 5 mg by mouth every evening.  BIOTIN PO Take 10,000 mcg by mouth daily.  CALCIUM CARBONATE/VITAMIN D3 (CALCIUM + D PO) Take 600 mg by mouth two (2) times a day.          Allergies   Allergen Reactions    Penicillin G Hives       Past Medical History:   Diagnosis Date    Bicuspid aortic valve     leaky valve    Fibroid uterus     Hypertension     Obstructive sleep apnea on CPAP 2017    Osteoporosis     osteopenia now per pt per last 2 bone densities    Thyroid disease     hx of synthroid use/thyroid bloodwork normal now       Past Surgical History:   Procedure Laterality Date    COLONOSCOPY N/A 5/7/2018    COLONOSCOPY performed by Iqra De Oliveira. Eliza Urbano MD at [facility] ENDOSCOPY    ENDOSCOPY, COLON, DIAGNOSTIC      HX GYN      left ovarian cyst removed    HX GYN  12/2018, 8/2020    D&C    HX KNEE ARTHROSCOPY Bilateral     torn meniscus    HX ORTHOPAEDIC      cyst left hand    HX ORTHOPAEDIC      bakers cyst    HX ORTHOPAEDIC  10/28/2020    left knee- mensicus repair    HX TONSILLECTOMY         Family History   Problem Relation Age of Onset    Heart Disease Mother     Cancer Mother 80        colorectal cancer    Cancer Father         neck    No Known Problems Sister     No Known Problems Sister     Cancer Maternal Grandmother         ?colon cancer    Cancer Paternal Grandmother         ?colon cancer    Cancer Other         breast - cousins    Heart Disease Other         cousins       Social History     Tobacco Use    Smoking status: Never Smoker    Smokeless tobacco: Never Used   Substance Use Topics    Alcohol use: Yes     Alcohol/week: 0.0 standard drinks     Comment: occasionally - very little         Depression Risk Factor Screening:     PHQ over the last two weeks    Little interest or pleasure in doing things Not at all   Feeling down, depressed or hopeless Not at all   Total Score PHQ 2 0     Alcohol Risk Factor Screening: You do not drink alcohol or very rarely. Functional Ability and Level of Safety:   Hearing Loss  Hearing is good. Activities of Daily Living  The home contains: discussed safety equipment. Patient does total self care    Fall RiskFall Risk Assessment, last 12 mths    Able to walk? Yes   Fall in past 12 months? No     Functional Ability:   Does the patient exhibit a steady gait? yes    How long did it take the patient to get up and walk from a sitting position? seconds    Is the patient self reliant? (ie can do own laundry, meals, household chores)  yes   Does the patient handle his/her own medications? yes   Does the patient handle his/her own money?   yes   Is the patients home safe (ie good lighting, handrails on stairs and bath, etc.)? yes   Did you notice or did patient express any hearing difficulties? no   Did you notice or did patient express any vision difficulties? no        Advance Care Planning:   Patient was offered the opportunity to discuss advance care planning:  yes    Does patient have an Advance Directive:  no   If no, did you provide information on Caring Connections? yes      Abuse Screen  Patient is not abused    Cognitive Screening   Evaluation of Cognitive Function:  Has your family/caregiver stated any concerns about your memory: no      Patient Care Team   Patient Care Team:  Tori Campbell MD as PCP - General    Assessment/Plan   Education and counseling provided:  Are appropriate based on today's review and evaluation  End-of-Life planning (with patient's consent)    ASSESSMENT and PLAN    Medicare Annual Wellness  Continue current treatment plan. Continue annual follow up. I have discussed diagnosis listed in this note with pt and/or family. I have discussed treatment plans and options and the risk/benefit analysis of those options, including safe use of medications and possible medication side effects. Through the use of shared decision making we have agreed to the above plan. The patient has received an after-visit summary and questions were answered concerning future plans and follow up. Advise pt of any urgent changes then to proceed to the ER.

## 2022-01-13 ENCOUNTER — TELEPHONE (OUTPATIENT)
Dept: SLEEP MEDICINE | Age: 72
End: 2022-01-13

## 2022-01-19 ENCOUNTER — DOCUMENTATION ONLY (OUTPATIENT)
Dept: SLEEP MEDICINE | Age: 72
End: 2022-01-19

## 2022-01-19 ENCOUNTER — OFFICE VISIT (OUTPATIENT)
Dept: SLEEP MEDICINE | Age: 72
End: 2022-01-19
Payer: MEDICARE

## 2022-01-19 ENCOUNTER — PATIENT MESSAGE (OUTPATIENT)
Dept: SLEEP MEDICINE | Age: 72
End: 2022-01-19

## 2022-01-19 VITALS
WEIGHT: 152 LBS | HEIGHT: 61 IN | OXYGEN SATURATION: 97 % | TEMPERATURE: 98.5 F | BODY MASS INDEX: 28.7 KG/M2 | HEART RATE: 80 BPM | SYSTOLIC BLOOD PRESSURE: 143 MMHG | DIASTOLIC BLOOD PRESSURE: 73 MMHG

## 2022-01-19 DIAGNOSIS — G47.33 OSA (OBSTRUCTIVE SLEEP APNEA): Primary | ICD-10-CM

## 2022-01-19 DIAGNOSIS — I10 PRIMARY HYPERTENSION: ICD-10-CM

## 2022-01-19 PROCEDURE — 3017F COLORECTAL CA SCREEN DOC REV: CPT | Performed by: NURSE PRACTITIONER

## 2022-01-19 PROCEDURE — G8419 CALC BMI OUT NRM PARAM NOF/U: HCPCS | Performed by: NURSE PRACTITIONER

## 2022-01-19 PROCEDURE — G8432 DEP SCR NOT DOC, RNG: HCPCS | Performed by: NURSE PRACTITIONER

## 2022-01-19 PROCEDURE — 99203 OFFICE O/P NEW LOW 30 MIN: CPT | Performed by: NURSE PRACTITIONER

## 2022-01-19 PROCEDURE — G8753 SYS BP > OR = 140: HCPCS | Performed by: NURSE PRACTITIONER

## 2022-01-19 PROCEDURE — 1101F PT FALLS ASSESS-DOCD LE1/YR: CPT | Performed by: NURSE PRACTITIONER

## 2022-01-19 PROCEDURE — 1090F PRES/ABSN URINE INCON ASSESS: CPT | Performed by: NURSE PRACTITIONER

## 2022-01-19 PROCEDURE — G9899 SCRN MAM PERF RSLTS DOC: HCPCS | Performed by: NURSE PRACTITIONER

## 2022-01-19 PROCEDURE — G8536 NO DOC ELDER MAL SCRN: HCPCS | Performed by: NURSE PRACTITIONER

## 2022-01-19 PROCEDURE — G8754 DIAS BP LESS 90: HCPCS | Performed by: NURSE PRACTITIONER

## 2022-01-19 PROCEDURE — G8427 DOCREV CUR MEDS BY ELIG CLIN: HCPCS | Performed by: NURSE PRACTITIONER

## 2022-01-19 NOTE — PROGRESS NOTES
[address] [facility]. [place], [address]   Tel.  [phone]   Fax. 100 [facility] 60   [place], [address] [facility]   Tel.  [phone]   Fax. [phone] [address]   Tel.  [phone]   Fax. [phone] Carolyn Perkins (: 1950) is a 70 y.o. female, established patient, seen for positive airway pressure follow-up and evaluation. She was last seen by Dr. Ashleigh Love on 2017, prior notes reviewed in detail. Home sleep test 2017 showed AHI of 15.9/hr with a lowest SaO2 of 88%, duration of SaO2 < 88% 0.0 min. She has not been followed since initial set up but notes no issues with device. ASSESSMENT/PLAN:    ICD-10-CM ICD-9-CM    1. JUSTA (obstructive sleep apnea)  G47.33 327.23 AMB SUPPLY ORDER   2. Primary hypertension  I10 401.9    3. Adult BMI 28.0-28.9 kg/sq m  Z68.28 V85.24        AHI = 15.9(2017). On CPAP, Respironics :  12 cmH2O. Set up 2017. She is adherent with PAP therapy and PAP continues to benefit patient and remains necessary for control of her sleep apnea. Her device is affected by the Zhang recall, review of  Care  shows no new device set up at this time. Follow-up and Dispositions    · Return in about 1 year (around 2023) for annual follow up. 1. Sleep Apnea -  Continue on current pressures. We have discussed the Zhang Respironics device recall, the need to register the device with Zhang, and I have advised positional therapy if PAP therapy is stopped. Her device does not show as registered in the [employer] system, [employer] information to be sent with number and link.     *  Tech to review mask fit for nasal pillows    * Supplies ordered - nasal pillows mask and heated tubing    Orders Placed This Encounter    AMB SUPPLY ORDER     Diagnosis: (G47.33) JUSTA (obstructive sleep apnea)  (primary encounter diagnosis)     Replacement Supplies for Positive Airway Pressure Therapy Device:   Duration of need: 99 months.  Nasal Pillows (Replace) 2 per month.  Nasal Interface Mask 1 every 3 months.  Pos Airway pressure chin strap   Headgear 1 every 6 months.  Tubing with heating element 1 every 3 months.  Filter(s) Disposable 2 per month.  Filter(s) Non-Disposable 1 every 6 months. .   [facility] for Humidifier (Replace) 1 every 6 months. ELOY HernandezAYDE-BC; NPI: [id number]    Electronically signed. Date:- 01/19/22       * Re-enforced proper and regular cleaning for the device. We discussed the So Clean or other ozone cleaning devices and the risks associated with Ozone, she was advised to stop using cleaning device and use soap and water instead. * She was asked to contact our office for any problems regarding PAP therapy. 2. Hypertension -  continue on her current regimen, she will continue to monitor her BP and follow up with her PMD for reevaluation/adjustment of medications if warranted. I have reviewed the relationship between hypertension as it relates to sleep-disordered breathing. 3. Encouraged continued weight management program through appropriate diet and exercise regimen as significant weight reduction has been shown to reduce severity of obstructive sleep apnea. SUBJECTIVE/OBJECTIVE:    She  is seen today for follow up on PAP device and reports no problems using the device. The following concerns identified:    Drowsiness no Problems exhaling no   Snoring no Forget to put on no   Mask Comfortable yes Can't fall asleep no   Dry Mouth no Mask falls off no   Air Leaking no Frequent awakenings no       She admits that her sleep has improved on PAP therapy using nasal pillows mask and heated tubing. She notes that the humidifier runs out sometimes, we discussed using a humidifier in the bed room.   She has been getting supplies regularly from equipment company, she will call [employer] to register her device. Review of device download indicated:  Set pressure: 12 cmH2O; Average % Night in Large Leak:  3.2  % Used Days >= 4 hours: 97.  Avg hours used:  9:43. Therapy Apnea Index averaged over PAP use: 2.5 /hr which reflects improved sleep breathing condition. Zaleski Sleepiness Score: 7 and Modified F.O.S.Q. Score Total / 2: 19 which reflects improved sleep quality over therapy time. Sleep Review of Systems: notable for Negative difficulty falling asleep; Negative awakenings at night; Negative early morning headaches; Negative memory problems; Negative concentration issues; Negative chest pain; Negative shortness of breath; Negative significant joint pain at night; Negative significant muscle pain at night; Negative rashes or itching; Negative heartburn; Negative significant mood issues    Review of Systems:  Constitutional:  No significant weight loss or weight gain. Eyes:  No blurred vision. CVS:  No significant chest pain  Pulm:  No significant shortness of breath  GI:  No significant nausea or vomiting  :  No significant nocturia  Musculoskeletal:  No significant joint pain at night  Skin:  No significant rashes  Neuro:  No significant dizziness   Psych:  No active mood issues      Visit Vitals  BP (!) 143/73 (BP 1 Location: Left upper arm, BP Patient Position: Sitting, BP Cuff Size: Adult)   Pulse 80   Temp 98.5 °F (36.9 °C) (Temporal)   Ht 5' 1\" (1.549 m)   Wt 152 lb (68.9 kg) Comment: patient reported   LMP 01/01/2004   SpO2 97%   BMI 28.72 kg/m²          General:   Alert, oriented, not in acute distress   Eyes:  Anicteric Sclerae; no obvious strabismus   Nose:  No obvious nasal septum deviation    Neck:   Midline trachea   Chest/Lungs:  Symmetrical lung expansion, clear lung fields on auscultation    CVS:  Normal rate, regular rhythm,  no JVD   Extremities:  No obvious rashes, no edema    Neuro:  No focal deficits;  No obvious tremor    Psych:  Normal affect,  normal countenance       An electronic signature was used to authenticate this note.     -- Mik Infante NP, [facility]  01/19/22

## 2022-01-19 NOTE — PATIENT INSTRUCTIONS
[address] [place]. [place], [address]  Tel.  [phone]  Fax. 100 [facility] 60  [place], [address] [facility]  Tel.  [phone]  Fax. [phone] [address]  Tel.  [phone]  Fax. [phone]     Learning About CPAP for Sleep Apnea  What is CPAP? CPAP is a small machine that you use at home every night while you sleep. It increases air pressure in your throat to keep your airway open. When you have sleep apnea, this can help you sleep better so you feel much better. CPAP stands for \"continuous positive airway pressure. \"  The CPAP machine will have one of the following:  · A mask that covers your nose and mouth  · Prongs that fit into your nose  · A mask that covers your nose only, the most common type. This type is called NCPAP. The N stands for \"nasal.\"  Why is it done? CPAP is usually the best treatment for obstructive sleep apnea. It is the first treatment choice and the most widely used. Your doctor may suggest CPAP if you have:  · Moderate to severe sleep apnea. · Sleep apnea and coronary artery disease (CAD) or heart failure. How does it help? · CPAP can help you have more normal sleep, so you feel less sleepy and more alert during the daytime. · CPAP may help keep heart failure or other heart problems from getting worse. · NCPAP may help lower your blood pressure. · If you use CPAP, your bed partner may also sleep better because you are not snoring or restless. What are the side effects? Some people who use CPAP have:  · A dry or stuffy nose and a sore throat. · Irritated skin on the face. · Sore eyes. · Bloating. If you have any of these problems, work with your doctor to fix them. Here are some things you can try:  · Be sure the mask or nasal prongs fit well. · See if your doctor can adjust the pressure of your CPAP. · If your nose is dry, try a humidifier.   · If your nose is runny or stuffy, try decongestant medicine or a steroid nasal spray. If these things do not help, you might try a different type of machine. Some machines have air pressure that adjusts on its own. Others have air pressures that are different when you breathe in than when you breathe out. This may reduce discomfort caused by too much pressure in your nose. Where can you learn more? Go to [employer]  Enter Chelsie Gisela in the search box to learn more about \"Learning About CPAP for Sleep Apnea. \"   © 1487-5525 Healthwise, Incorporated. Care instructions adapted under license by [employer] (which disclaims liability or warranty for this information). This care instruction is for use with your licensed healthcare professional. If you have questions about a medical condition or this instruction, always ask your healthcare professional. [place] [address] any warranty or liability for your use of this information. Content Version: 8.1.84946; Last Revised: January 11, 2010  PROPER SLEEP HYGIENE    What to avoid  · Do not have drinks with caffeine, such as coffee or black tea, for 8 hours before bed. · Do not smoke or use other types of tobacco near bedtime. Nicotine is a stimulant and can keep you awake. · Avoid drinking alcohol late in the evening, because it can cause you to wake in the middle of the night. · Do not eat a big meal close to bedtime. If you are hungry, eat a light snack. · Do not drink a lot of water close to bedtime, because the need to urinate may wake you up during the night. · Do not read or watch TV in bed. Use the bed only for sleeping and sexual activity. What to try  · Go to bed at the same time every night, and wake up at the same time every morning. Do not take naps during the day. · Keep your bedroom quiet, dark, and cool. · Get regular exercise, but not within 3 to 4 hours of your bedtime. .  · Sleep on a comfortable pillow and mattress.   · If watching the clock makes you anxious, turn it facing away from you so you cannot see the time. · If you worry when you lie down, start a worry book. Well before bedtime, write down your worries, and then set the book and your concerns aside. · Try meditation or other relaxation techniques before you go to bed. · If you cannot fall asleep, get up and go to another room until you feel sleepy. Do something relaxing. Repeat your bedtime routine before you go to bed again. · Make your house quiet and calm about an hour before bedtime. Turn down the lights, turn off the TV, log off the computer, and turn down the volume on music. This can help you relax after a busy day. Drowsy Driving: The [facility] 54 cites drowsiness as a causing factor in more than 937,302 police reported crashes annually, resulting in 76,000 injuries and 1,500 deaths. Other surveys suggest 55% of people polled have driven while drowsy in the past year, 23% had fallen asleep but not crashed, 3% crashed, and 2% had and accident due to drowsy driving. Who is at risk? Young Drivers: One study of drowsy driving accidents states that 55% of the drivers were under 25 years. Of those, 75% were male. Shift Workers and Travelers: People who work overnight or travel across time zones frequently are at higher risk of experiencing Circadian Rhythm Disorders. They are trying to work and function when their body is programed to sleep. Sleep Deprived: Lack of sleep has a serious impact on your ability to pay attention or focus on a task. Consistently getting less than the average of 8 hours your body needs creates partial or cumulative sleep deprivation. Untreated Sleep Disorders: Sleep Apnea, Narcolepsy, R.L.S., and other sleep disorders (untreated) prevent a person from getting enough restful sleep. This leads to excessive daytime sleepiness and increases the risk for drowsy driving accidents by up to 7 times.   Medications / Alcohol: Even over the counter medications can cause drowsiness. Medications that impair a drivers attention should have a warning label. Alcohol naturally makes you sleepy and on its own can cause accidents. Combined with excessive drowsiness its effects are amplified. Signs of Drowsy Driving:   * You don't remember driving the last few miles   * You may drift out of your ronn   * You are unable to focus and your thoughts wander   * You may yawn more often than normal   * You have difficulty keeping your eyes open / nodding off   * Missing traffic signs, speeding, or tailgating  Prevention-   Good sleep hygiene, lifestyle and behavioral choices have the most impact on drowsy driving. There is no substitute for sleep and the average person requires 8 hours nightly. If you find yourself driving drowsy, stop and sleep. Consider the sleep hygiene tips provided during your visit as well. Medication Refill Policy: Refills for all medications require 1 week advance notice. Please have your pharmacy fax a refill request. We are unable to fax, or call in \"controled substance\" medications and you will need to pick these prescriptions up from our office. TouchTen Activation    Thank you for requesting access to TouchTen. Please follow the instructions below to securely access and download your online medical record. TouchTen allows you to send messages to your doctor, view your test results, renew your prescriptions, schedule appointments, and more. How Do I Sign Up? 1. In your internet browser, go to [email or web address]. 2. Click on the First Time User? Click Here link in the Sign In box. You will see the New Member Sign Up page. 3. Enter your TouchTen Access Code exactly as it appears below. You will not need to use this code after youve completed the sign-up process. If you do not sign up before the expiration date, you must request a new code. TouchTen Access Code:  Activation code not generated  Current BlackArrow Status: Active (This is the date your BlackArrow access code will )    4. Enter the last four digits of your Social Security Number (xxxx) and Date of Birth (mm/dd/yyyy) as indicated and click Submit. You will be taken to the next sign-up page. 5. Create a GrabCADt ID. This will be your GrabCADt login ID and cannot be changed, so think of one that is secure and easy to remember. 6. Create a BlackArrow password. You can change your password at any time. 7. Enter your Password Reset Question and Answer. This can be used at a later time if you forget your password. 8. Enter your e-mail address. You will receive e-mail notification when new information is available in [address] 9. Click Sign Up. You can now view and download portions of your medical record. 10. Click the Download Summary menu link to download a portable copy of your medical information. Additional Information    If you have questions, please call [phone]. Remember, BlackArrow is NOT to be used for urgent needs. For medical emergencies, dial 911.

## 2022-02-08 ENCOUNTER — TELEPHONE (OUTPATIENT)
Dept: FAMILY MEDICINE CLINIC | Age: 72
End: 2022-02-08

## 2022-02-08 NOTE — TELEPHONE ENCOUNTER
----- Message from Ward Baker sent at 2/8/2022  9:19 AM EST -----  Subject: Message to Provider    QUESTIONS  Information for Provider? PT called asking if her provider could call   [facility] as PT stated she needed her Provider had to call per her insurance   as she needs to get an ultrasound. Please call [phone]. If you have   any questions please call PT.  ---------------------------------------------------------------------------  --------------  CALL BACK INFO  What is the best way for the office to contact you? OK to leave message on   voicemail  Preferred Call Back Phone Number? [phone]  ---------------------------------------------------------------------------  --------------  SCRIPT ANSWERS  Relationship to Patient?  Self

## 2022-02-08 NOTE — TELEPHONE ENCOUNTER
Spoke with patient and states Sarah Walton needs to call her insurance company for breast ultrasound. Informed patient usually Sarah Walton does ultrasound and they would need to contact insurance company if they are doing an ultrasound. Patient agreed and stated she has an appt Thursday and she would get it straight because it did not make sense to her why Sarah Walton wanted her PCP to call her insurance for ultrasound. Informed patient if any questions to please call back if writer could be of any assistance. Patient verbalized understanding.

## 2022-02-21 RX ORDER — CETIRIZINE HCL 10 MG
TABLET ORAL
Qty: 90 TABLET | Refills: 3 | Status: SHIPPED | OUTPATIENT
Start: 2022-02-21

## 2022-02-24 RX ORDER — RALOXIFENE HYDROCHLORIDE 60 MG/1
60 TABLET, FILM COATED ORAL DAILY
Qty: 90 TABLET | Refills: 3 | Status: SHIPPED | OUTPATIENT
Start: 2022-02-24

## 2022-02-24 NOTE — TELEPHONE ENCOUNTER
Last visit:11/02/21  Next visit: not scheduled  Previous refill 1/24/22(90+3R) [facility] sent fax requesting medication be sent to them    Requested Prescriptions     Pending Prescriptions Disp Refills    raloxifene (EVISTA) 60 mg tablet 90 Tablet 3     Sig: Take 1 Tablet by mouth daily.

## 2022-03-19 PROBLEM — Z51.81 ENCOUNTER FOR MEDICATION MONITORING: Status: ACTIVE | Noted: 2017-02-22

## 2022-04-22 NOTE — PROGRESS NOTES
Samanta Roche (: 1950) is a 67 y.o. female, patient, here for evaluation of the following chief complaint(s):  Knee Pain (right)       HPI:    She began having increased right knee pain several weeks ago. The patient reports no specific injury and states that her pain came on gradually. She describes her pain as mild and dull. The patient has been experiencing some swelling in her right knee. Over the last several weeks, the patient states that her pain levels unchanged. She reports that lifting and kneeling make her pain worse and ice and elevation make her pain better. She has been taking ibuprofen for discomfort as needed. Allergies   Allergen Reactions    Penicillin G Hives       Current Outpatient Medications   Medication Sig    ibuprofen (MOTRIN) 800 mg tablet Take 1 Tablet by mouth three (3) times daily.  raloxifene (EVISTA) 60 mg tablet Take 1 Tablet by mouth daily.  cetirizine (ZYRTEC) 10 mg tablet TAKE 1 TABLET DAILY    desoximetasone (TOPICORT) 0.25 % topical cream Apply  to affected area two (2) times daily as needed for Skin Irritation.  fluticasone propionate (FLONASE) 50 mcg/actuation nasal spray USE 2 SPRAYS IN EACH NOSTRIL DAILY AS NEEDED FOR RHINITIS    ibuprofen (MOTRIN) 800 mg tablet Take 800 mg by mouth every six (6) hours as needed.  multivit-min/iron/folic/lutein (CENTRUM SILVER WOMEN PO) Take 1 Tab by mouth daily.  ASPIRIN PO Take 81 mg by mouth every evening. coated    lisinopril (PRINIVIL, ZESTRIL) 5 mg tablet Take 5 mg by mouth every evening.  BIOTIN PO Take 10,000 mcg by mouth daily.  CALCIUM CARBONATE/VITAMIN D3 (CALCIUM + D PO) Take 600 mg by mouth two (2) times a day. No current facility-administered medications for this visit.        Past Medical History:   Diagnosis Date    Bicuspid aortic valve     leaky valve    Fibroid uterus     Hypertension     Obstructive sleep apnea on CPAP     Osteoporosis     osteopenia now per pt per last 2 bone densities    Thyroid disease     hx of synthroid use/thyroid bloodwork normal now        Past Surgical History:   Procedure Laterality Date    COLONOSCOPY N/A 5/7/2018    COLONOSCOPY performed by Ana Jack. Antione Goins MD at [facility] ENDOSCOPY    ENDOSCOPY, COLON, DIAGNOSTIC      HX GYN      left ovarian cyst removed    HX GYN  12/2018, 8/2020    D&C    HX KNEE ARTHROSCOPY Bilateral     torn meniscus    HX ORTHOPAEDIC      cyst left hand    HX ORTHOPAEDIC      bakers cyst    HX ORTHOPAEDIC  10/28/2020    left knee- mensicus repair    HX TONSILLECTOMY         Family History   Problem Relation Age of Onset    Heart Disease Mother     Cancer Mother 80        colorectal cancer    Cancer Father         neck    No Known Problems Sister     No Known Problems Sister     Cancer Maternal Grandmother         ?colon cancer    Cancer Paternal Grandmother         ?colon cancer    Cancer Other         breast - cousins    Heart Disease Other         cousins        Social History     Socioeconomic History    Marital status:      Spouse name: Not on file    Number of children: Not on file    Years of education: Not on file    Highest education level: Not on file   Occupational History    Not on file   Tobacco Use    Smoking status: Never Smoker    Smokeless tobacco: Never Used   Vaping Use    Vaping Use: Never used   Substance and Sexual Activity    Alcohol use:  Yes     Alcohol/week: 0.0 standard drinks     Comment: occasionally - very little    Drug use: No    Sexual activity: Yes     Partners: Male     Birth control/protection: None   Other Topics Concern    Not on file   Social History Narrative    Not on file     Social Determinants of Health     Financial Resource Strain:     Difficulty of Paying Living Expenses: Not on file   Food Insecurity:     Worried About Running Out of Food in the Last Year: Not on file    Khushbu of Food in the Last Year: Not on file   Transportation Needs:  Lack of Transportation (Medical): Not on file    Lack of Transportation (Non-Medical): Not on file   Physical Activity:     Days of Exercise per Week: Not on file    Minutes of Exercise per Session: Not on file   Stress:     Feeling of Stress : Not on file   Social Connections:     Frequency of Communication with Friends and Family: Not on file    Frequency of Social Gatherings with Friends and Family: Not on file    Attends Orthodox Services: Not on file    Active Member of [address] [place] [address] or Organizations: Not on file    Attends Club or Organization Meetings: Not on file    Marital Status: Not on file   Intimate Partner Violence:     Fear of Current or Ex-Partner: Not on file    Emotionally Abused: Not on file    Physically Abused: Not on file    Sexually Abused: Not on file   Housing Stability:     Unable to Pay for Housing in the Last Year: Not on file    Number of [place] in the Last Year: Not on file    Unstable Housing in the Last Year: Not on file       Review of Systems   All other systems reviewed and are negative. Vitals:  Ht 5' 1\" (1.549 m)   Wt 155 lb (70.3 kg)   LMP 01/01/2004   BMI 29.29 kg/m²    Body mass index is 29.29 kg/m². Ortho Exam     The patient is well-developed and well-nourished. The patient presents today in alert and oriented x3 with a normal mood and affect. The patient stands with a normal weightbearing line but walks with a slightly antalgic gait because of her right knee pain. Right knee, the patient is tender to palpation along the medial joint line, and has a moderate-sized effusion. They are tender to palpation along the medial and lateral facets of the patella. The patient has no discomfort with Samir's maneuvers, and the knee is stable. They have limited range of motion. They have 5/5 strength, and are neurovascularly intact distally. There is no erythema, warmth or skin lesions present. ASSESSMENT/PLAN:      1.  Chronic pain of right knee  -     ibuprofen (MOTRIN) 800 mg tablet; Take 1 Tablet by mouth three (3) times daily. , Normal, Disp-90 Tablet, R-1  2. Primary osteoarthritis of right knee  -     bupivacaine (PF) (MARCAINE) 0.5 % (5 mg/mL) injection 15 mg; 15 mg (3 mL), Intra artICUlar, ONCE, 1 dose, On Tue 4/26/22 at 1200  -     triamcinolone acetonide (KENALOG-40) 40 mg/mL injection 80 mg; 80 mg, Intra artICUlar, ONCE, 1 dose, On Tue 4/26/22 at 1200       Below is the assessment and plan developed based on review of pertinent history, physical exam, labs, studies, and medications. We discussed the patient's ongoing right knee pain and her signs, symptoms, physical exam, and description of her pain are consistent with medial compartment and patellofemoral compartment osteoarthritis with a moderate size effusion. The possible treatment options were discussed with the patient and because of an effusion present we elected to aspirate and inject her right knee with cortisone today to try and alleviate some of her discomfort. The risks and benefits of the aspiration and injection were discussed in detail with the patient and under sterile prep the patient's right knee was aspirated of approximately 20 ccs straw-colored synovial fluid and injected with 2 ccs of 40 mg/cc of Kenalog and 3 cc of 0.5% Sensorcaine. She tolerated both the aspiration and injection well. I did encourage her to continue to ice and elevate when possible, modify her activity level based on her right knee pain, monitor her effusion, and use anti-inflammatory medication when necessary. The patient was given a prescription for ibuprofen which she will use as needed and as directed. The patient will also work on range of motion, strengthening, and stretching exercises with an at-home exercise program as pain tolerates. She is to avoid any deep knee bend activities against resistance, squatting, kneeling, stairs, lunging, and high impact loading activities.   I will see her back on an as-needed basis for right knee pain. Return if symptoms worsen or fail to improve. An electronic signature was used to authenticate this note.   -- Shelbie Camargo MD

## 2022-04-26 ENCOUNTER — OFFICE VISIT (OUTPATIENT)
Dept: ORTHOPEDIC SURGERY | Age: 72
End: 2022-04-26
Payer: MEDICARE

## 2022-04-26 VITALS — HEIGHT: 61 IN | BODY MASS INDEX: 29.27 KG/M2 | WEIGHT: 155 LBS

## 2022-04-26 DIAGNOSIS — G89.29 CHRONIC PAIN OF RIGHT KNEE: Primary | ICD-10-CM

## 2022-04-26 DIAGNOSIS — M17.11 PRIMARY OSTEOARTHRITIS OF RIGHT KNEE: ICD-10-CM

## 2022-04-26 DIAGNOSIS — M25.561 CHRONIC PAIN OF RIGHT KNEE: Primary | ICD-10-CM

## 2022-04-26 PROCEDURE — 20610 DRAIN/INJ JOINT/BURSA W/O US: CPT | Performed by: ORTHOPAEDIC SURGERY

## 2022-04-26 PROCEDURE — G8427 DOCREV CUR MEDS BY ELIG CLIN: HCPCS | Performed by: ORTHOPAEDIC SURGERY

## 2022-04-26 PROCEDURE — 3017F COLORECTAL CA SCREEN DOC REV: CPT | Performed by: ORTHOPAEDIC SURGERY

## 2022-04-26 PROCEDURE — 99214 OFFICE O/P EST MOD 30 MIN: CPT | Performed by: ORTHOPAEDIC SURGERY

## 2022-04-26 PROCEDURE — 1090F PRES/ABSN URINE INCON ASSESS: CPT | Performed by: ORTHOPAEDIC SURGERY

## 2022-04-26 PROCEDURE — G8536 NO DOC ELDER MAL SCRN: HCPCS | Performed by: ORTHOPAEDIC SURGERY

## 2022-04-26 PROCEDURE — G8756 NO BP MEASURE DOC: HCPCS | Performed by: ORTHOPAEDIC SURGERY

## 2022-04-26 PROCEDURE — 1101F PT FALLS ASSESS-DOCD LE1/YR: CPT | Performed by: ORTHOPAEDIC SURGERY

## 2022-04-26 PROCEDURE — G9899 SCRN MAM PERF RSLTS DOC: HCPCS | Performed by: ORTHOPAEDIC SURGERY

## 2022-04-26 PROCEDURE — G8432 DEP SCR NOT DOC, RNG: HCPCS | Performed by: ORTHOPAEDIC SURGERY

## 2022-04-26 PROCEDURE — G8419 CALC BMI OUT NRM PARAM NOF/U: HCPCS | Performed by: ORTHOPAEDIC SURGERY

## 2022-04-26 RX ORDER — IBUPROFEN 800 MG/1
800 TABLET ORAL 3 TIMES DAILY
Qty: 90 TABLET | Refills: 1 | Status: SHIPPED | OUTPATIENT
Start: 2022-04-26 | End: 2022-11-04 | Stop reason: ALTCHOICE

## 2022-04-26 RX ORDER — TRIAMCINOLONE ACETONIDE 40 MG/ML
80 INJECTION, SUSPENSION INTRA-ARTICULAR; INTRAMUSCULAR ONCE
Status: COMPLETED | OUTPATIENT
Start: 2022-04-26 | End: 2022-04-26

## 2022-04-26 RX ORDER — BUPIVACAINE HYDROCHLORIDE 5 MG/ML
3 INJECTION, SOLUTION EPIDURAL; INTRACAUDAL ONCE
Status: COMPLETED | OUTPATIENT
Start: 2022-04-26 | End: 2022-04-26

## 2022-04-26 RX ADMIN — TRIAMCINOLONE ACETONIDE 80 MG: 40 INJECTION, SUSPENSION INTRA-ARTICULAR; INTRAMUSCULAR at 11:07

## 2022-04-26 RX ADMIN — BUPIVACAINE HYDROCHLORIDE 15 MG: 5 INJECTION, SOLUTION EPIDURAL; INTRACAUDAL at 11:07

## 2022-07-26 DIAGNOSIS — Z91.09 ENVIRONMENTAL ALLERGIES: ICD-10-CM

## 2022-07-26 RX ORDER — FLUTICASONE PROPIONATE 50 MCG
SPRAY, SUSPENSION (ML) NASAL
Qty: 48 G | Refills: 3 | Status: SHIPPED | OUTPATIENT
Start: 2022-07-26

## 2022-08-15 ENCOUNTER — DOCUMENTATION ONLY (OUTPATIENT)
Dept: SLEEP MEDICINE | Age: 72
End: 2022-08-15

## 2022-08-15 ENCOUNTER — VIRTUAL VISIT (OUTPATIENT)
Dept: SLEEP MEDICINE | Age: 72
End: 2022-08-15
Payer: MEDICARE

## 2022-08-15 DIAGNOSIS — G47.33 OSA (OBSTRUCTIVE SLEEP APNEA): Primary | ICD-10-CM

## 2022-08-15 DIAGNOSIS — I10 PRIMARY HYPERTENSION: ICD-10-CM

## 2022-08-15 PROCEDURE — G8417 CALC BMI ABV UP PARAM F/U: HCPCS | Performed by: NURSE PRACTITIONER

## 2022-08-15 PROCEDURE — G8427 DOCREV CUR MEDS BY ELIG CLIN: HCPCS | Performed by: NURSE PRACTITIONER

## 2022-08-15 PROCEDURE — G8756 NO BP MEASURE DOC: HCPCS | Performed by: NURSE PRACTITIONER

## 2022-08-15 PROCEDURE — 99213 OFFICE O/P EST LOW 20 MIN: CPT | Performed by: NURSE PRACTITIONER

## 2022-08-15 PROCEDURE — G9899 SCRN MAM PERF RSLTS DOC: HCPCS | Performed by: NURSE PRACTITIONER

## 2022-08-15 PROCEDURE — G8536 NO DOC ELDER MAL SCRN: HCPCS | Performed by: NURSE PRACTITIONER

## 2022-08-15 PROCEDURE — G8432 DEP SCR NOT DOC, RNG: HCPCS | Performed by: NURSE PRACTITIONER

## 2022-08-15 PROCEDURE — 1101F PT FALLS ASSESS-DOCD LE1/YR: CPT | Performed by: NURSE PRACTITIONER

## 2022-08-15 PROCEDURE — 1090F PRES/ABSN URINE INCON ASSESS: CPT | Performed by: NURSE PRACTITIONER

## 2022-08-15 PROCEDURE — 1123F ACP DISCUSS/DSCN MKR DOCD: CPT | Performed by: NURSE PRACTITIONER

## 2022-08-15 PROCEDURE — 3017F COLORECTAL CA SCREEN DOC REV: CPT | Performed by: NURSE PRACTITIONER

## 2022-08-15 NOTE — PROGRESS NOTES
[address] [facility] [place]. [place], [address]   Tel.  [phone]   Fax. 100 [facility] 60   [place], [address] [facility]   Tel.  [phone]   Fax. [phone] [address] [facility] [address]   Tel.  [phone]   Fax. [phone] Carolyn Perkins (: 1950) is a 70 y.o. female, established patient, seen for positive airway pressure follow-up and evaluation. She was last seen by Sapna Holguin NP on 2022, previously seen by Dr. Kait Mata on 2017, prior notes reviewed in detail. Home sleep test 2017 showed AHI of 15.9/hr with a lowest SaO2 of 88%, duration of SaO2 < 88% 0.0 min. She is seen today for follow up. ASSESSMENT/PLAN:    ICD-10-CM ICD-9-CM    1. JUSTA (obstructive sleep apnea)  G47.33 327.23 AMB SUPPLY ORDER      2. Primary hypertension  I10 401.9       3. BMI 29.0-29.9,adult  Z68.29 V85.25         AHI = 15.9 (2017). On DS 2 Respironics CPAP :  9 cmH2O. Set up 2017. She is adherent with PAP therapy and PAP continues to benefit patient and remains necessary for control of her sleep apnea. Sleep Apnea -  She has received her new replacement device from [facility] and reports doing well. * Supplies ordered - nasal mask and heated tubing    Orders Placed This Encounter    AMB SUPPLY ORDER     Diagnosis: (G47.33) JUSTA (obstructive sleep apnea)  (primary encounter diagnosis)     Replacement Supplies for Positive Airway Pressure Therapy Device:   Duration of need: 99 months.  Nasal Pillows Combo Mask (Replace) 2 per month.  Nasal Pillows (Replace) 2 per month.  Nasal Cushion (Replace) 2 per month.  Nasal Interface Mask 1 every 3 months.  Headgear 1 every 6 months.  Positive Airway Pressure chinstrap 1 every 6 months.  Tubing with heating element 1 every 3 months.  Filter(s) Disposable 2 per month.  Filter(s) Non-Disposable 1 every 6 months.    .   [address] Chamber for Humidifier (Replace) 1 every 6 months. Estrella Chen, P-BC NPI: [id number]    Electronically signed. Date:- 08/15/22     *  Counseling was provided regarding the importance of regular PAP use with emphasis on ensuring sufficient total sleep time, proper sleep hygiene, and safe driving. * Re-enforced proper and regular cleaning for the device. * She was asked to contact our office for any problems regarding PAP therapy. 2. Hypertension -  continue on her current regimen, she will continue to monitor her BP and follow up with her PMD for reevaluation/adjustment of medications if warranted. I have reviewed the relationship between hypertension as it relates to sleep-disordered breathing. 3. Recommended a dedicated weight loss program through appropriate diet and exercise regimen as significant weight reduction has been shown to reduce severity of obstructive sleep apnea. SUBJECTIVE/OBJECTIVE:    She  is seen today for follow up on PAP device and reports no problems using the device. The following concerns reviewed:    Drowsiness no Problems exhaling no   Snoring no Forget to put on no   Mask Comfortable yes Can't fall asleep no   Dry Mouth no Mask falls off no   Air Leaking no Frequent awakenings no     She admits that her sleep has significantly improved on PAP therapy using nasal mask and heated tubing. Review of device download indicated:  CPAP pressure: 9 cmH2O   Average % Night in Large Leak:  0  % Used Days >= 4 hours: 96.7. Avg hours used:  9 hours 18 minutes. Therapy Apnea Index averaged over PAP use: 2.8 /hr which reflects significantly improved sleep breathing condition. [place] Sleepiness Score: 11 and Modified F.O.S.Q. Score Total / 2: 19.5 which reflects improved sleep quality over therapy time. Sleep Review of Systems: notable for Negative difficulty falling asleep;  Negative awakenings at night; Negative early morning headaches; Negative memory problems; Negative concentration issues; Negative chest pain; Negative shortness of breath; Negative significant joint pain at night; Negative significant muscle pain at night; Negative rashes or itching; Negative heartburn; Negative significant mood issues    Vitals reported by patient   Patient-Reported Vitals 8/15/2022   Patient-Reported Weight 153 lb   Patient-Reported Systolic  746   Patient-Reported Diastolic 80      Calculated BMI 29    Physical Exam completed by visual and auditory observation of patient with verbal input from patient. General:   Alert, oriented, not in acute distress   Eyes:  Anicteric Sclerae; no obvious strabismus   Nose:  No obvious nasal septum deviation    Neck:   Midline trachea, no visible mass   Chest/Lungs:  Respiratory effort normal, no visualized signs of difficulty breathing or respiratory distress   CVS:  No JVD   Extremities:  No obvious rashes noted on face, neck, or hands   Neuro:  No facial asymmetry, no focal deficits; no obvious tremor    Psych:  Normal affect,  normal countenance     Ky Acosta is being evaluated by a Virtual Visit (video visit) encounter to address concerns as mentioned above. A caregiver was present when appropriate. Due to this being a TeleHealth encounter ([facility] emergency), evaluation of the following organ systems was limited: Vitals/Constitutional/EENT/Resp/CV/GI//MS/Neuro/Skin/Heme-Lymph-Imm. Pursuant to the emergency declaration under the [address] [place] [address] and the [employer] General Act, this Virtual Visit was conducted with patient's (and/or legal guardian's) consent, to reduce the patient's risk of exposure to COVID-19 and provide necessary medical care. The patient (or guardian if applicable) is aware that this is a billable service, which includes applicable copays.      Patient identification was verified at the start of the visit: YES using name and date of birth. Patient's phone number [phone] (home) [phone] (work) was confirmed for accuracy. She gives permission for messages regarding results and appointments to be left at that number. Services were provided through a video synchronous discussion virtually to substitute for in-person clinic visit. I was at home while conducting this encounter, patient located at their home or alternate location of their choice. Patrizia Love, was evaluated through a synchronous (real-time) audio-video encounter. The patient (or guardian if applicable) is aware that this is a billable service, which includes applicable co-pays. This Virtual Visit was conducted with patient's (and/or legal guardian's) consent. The visit was conducted pursuant to the emergency declaration under the [address] [place] [address], [address] [place] [address] authority and the [employer] General Act. Patient identification was verified, and a caregiver was present when appropriate. The patient was located at: Home: [address] [place] [id number]  The provider was located at: Home: [unfilled]      --Franco Mari NP on 8/15/2022 at 8:19 AM    An electronic signature was used to authenticate this note.

## 2022-08-15 NOTE — PATIENT INSTRUCTIONS
[address] [facility] [place]. [place], [address]  Tel.  [phone]  Fax. 100 [facility] 60  [place], [address] [facility]  Tel.  [phone]  Fax. [phone] [address]  Tel.  [phone]  Fax. [phone]     Learning About CPAP for Sleep Apnea  What is CPAP? CPAP is a small machine that you use at home every night while you sleep. It increases air pressure in your throat to keep your airway open. When you have sleep apnea, this can help you sleep better so you feel much better. CPAP stands for \"continuous positive airway pressure. \"  The CPAP machine will have one of the following:  A mask that covers your nose and mouth  Prongs that fit into your nose  A mask that covers your nose only, the most common type. This type is called NCPAP. The N stands for \"nasal.\"  Why is it done? CPAP is usually the best treatment for obstructive sleep apnea. It is the first treatment choice and the most widely used. Your doctor may suggest CPAP if you have: Moderate to severe sleep apnea. Sleep apnea and coronary artery disease (CAD) or heart failure. How does it help? CPAP can help you have more normal sleep, so you feel less sleepy and more alert during the daytime. CPAP may help keep heart failure or other heart problems from getting worse. NCPAP may help lower your blood pressure. If you use CPAP, your bed partner may also sleep better because you are not snoring or restless. What are the side effects? Some people who use CPAP have:  A dry or stuffy nose and a sore throat. Irritated skin on the face. Sore eyes. Bloating. If you have any of these problems, work with your doctor to fix them. Here are some things you can try:  Be sure the mask or nasal prongs fit well. See if your doctor can adjust the pressure of your CPAP. If your nose is dry, try a humidifier.   If your nose is runny or stuffy, try decongestant medicine or a steroid nasal spray. If these things do not help, you might try a different type of machine. Some machines have air pressure that adjusts on its own. Others have air pressures that are different when you breathe in than when you breathe out. This may reduce discomfort caused by too much pressure in your nose. Where can you learn more? Go to [employer]  Enter Giovana Moore in the search box to learn more about \"Learning About CPAP for Sleep Apnea. \"   © 4160-1736 Healthwise, Incorporated. Care instructions adapted under license by [address] [place] [address] [employer] (which disclaims liability or warranty for this information). This care instruction is for use with your licensed healthcare professional. If you have questions about a medical condition or this instruction, always ask your healthcare professional. [address] any warranty or liability for your use of this information. Content Version: 2.6.24784; Last Revised: January 11, 2010  PROPER SLEEP HYGIENE    What to avoid  Do not have drinks with caffeine, such as coffee or black tea, for 8 hours before bed. Do not smoke or use other types of tobacco near bedtime. Nicotine is a stimulant and can keep you awake. Avoid drinking alcohol late in the evening, because it can cause you to wake in the middle of the night. Do not eat a big meal close to bedtime. If you are hungry, eat a light snack. Do not drink a lot of water close to bedtime, because the need to urinate may wake you up during the night. Do not read or watch TV in bed. Use the bed only for sleeping and sexual activity. What to try  Go to bed at the same time every night, and wake up at the same time every morning. Do not take naps during the day. Keep your bedroom quiet, dark, and cool. Get regular exercise, but not within 3 to 4 hours of your bedtime. .  Sleep on a comfortable pillow and mattress.   If watching the clock makes you anxious, turn it facing away from you so you cannot see the time. If you worry when you lie down, start a worry book. Well before bedtime, write down your worries, and then set the book and your concerns aside. Try meditation or other relaxation techniques before you go to bed. If you cannot fall asleep, get up and go to another room until you feel sleepy. Do something relaxing. Repeat your bedtime routine before you go to bed again. Make your house quiet and calm about an hour before bedtime. Turn down the lights, turn off the TV, log off the computer, and turn down the volume on music. This can help you relax after a busy day. Drowsy Driving: The [employer] cites drowsiness as a causing factor in more than 622,857 police reported crashes annually, resulting in 76,000 injuries and 1,500 deaths. Other surveys suggest 55% of people polled have driven while drowsy in the past year, 23% had fallen asleep but not crashed, 3% crashed, and 2% had and accident due to drowsy driving. Who is at risk? Young Drivers: One study of drowsy driving accidents states that 55% of the drivers were under 25 years. Of those, 75% were male. Shift Workers and Travelers: People who work overnight or travel across time zones frequently are at higher risk of experiencing Circadian Rhythm Disorders. They are trying to work and function when their body is programed to sleep. Sleep Deprived: Lack of sleep has a serious impact on your ability to pay attention or focus on a task. Consistently getting less than the average of 8 hours your body needs creates partial or cumulative sleep deprivation. Untreated Sleep Disorders: Sleep Apnea, Narcolepsy, R.L.S., and other sleep disorders (untreated) prevent a person from getting enough restful sleep. This leads to excessive daytime sleepiness and increases the risk for drowsy driving accidents by up to 7 times.   Medications / Alcohol: Even over the counter medications can cause drowsiness. Medications that impair a drivers attention should have a warning label. Alcohol naturally makes you sleepy and on its own can cause accidents. Combined with excessive drowsiness its effects are amplified. Signs of Drowsy Driving:   * You don't remember driving the last few miles   * You may drift out of your ronn   * You are unable to focus and your thoughts wander   * You may yawn more often than normal   * You have difficulty keeping your eyes open / nodding off   * Missing traffic signs, speeding, or tailgating  Prevention-   Good sleep hygiene, lifestyle and behavioral choices have the most impact on drowsy driving. There is no substitute for sleep and the average person requires 8 hours nightly. If you find yourself driving drowsy, stop and sleep. Consider the sleep hygiene tips provided during your visit as well. Medication Refill Policy: Refills for all medications require 1 week advance notice. Please have your pharmacy fax a refill request. We are unable to fax, or call in \"controled substance\" medications and you will need to pick these prescriptions up from our office. [employer] Activation    Thank you for requesting access to [employer]. Please follow the instructions below to securely access and download your online medical record. [employer] allows you to send messages to your doctor, view your test results, renew your prescriptions, schedule appointments, and more. How Do I Sign Up? In your internet browser, go to [email or web address]. Click on the First Time User? Click Here link in the Sign In box. You will see the New Member Sign Up page. Enter your [employer] Access Code exactly as it appears below. You will not need to use this code after youve completed the sign-up process. If you do not sign up before the expiration date, you must request a new code. [employer] Access Code:  Activation code not generated  Current [employer] Status: Active (This is the date your [employer]XT-[employer] access code will )    Enter the last four digits of your Social Security Number (xxxx) and Date of Birth (mm/dd/yyyy) as indicated and click Submit. You will be taken to the next sign-up page. Create a [employer]XT-ID ID. This will be your [employer]-ID login ID and cannot be changed, so think of one that is secure and easy to remember. Create a [employer] password. You can change your password at any time. Enter your Password Reset Question and Answer. This can be used at a later time if you forget your password. Enter your e-mail address. You will receive e-mail notification when new information is available in [address] Click Sign Up. You can now view and download portions of your medical record. Click the [employer] link to download a portable copy of your medical information. Additional Information    If you have questions, please call [phone]. Remember, NXT-ID is NOT to be used for urgent needs. For medical emergencies, dial 911.

## 2022-08-29 ENCOUNTER — OFFICE VISIT (OUTPATIENT)
Dept: ORTHOPEDIC SURGERY | Age: 72
End: 2022-08-29
Payer: MEDICARE

## 2022-08-29 VITALS — BODY MASS INDEX: 28.32 KG/M2 | WEIGHT: 150 LBS | HEIGHT: 61 IN

## 2022-08-29 DIAGNOSIS — M67.432 GANGLION CYST OF WRIST, LEFT: Primary | ICD-10-CM

## 2022-08-29 DIAGNOSIS — M19.042 PRIMARY OSTEOARTHRITIS OF BOTH HANDS: ICD-10-CM

## 2022-08-29 DIAGNOSIS — M19.041 PRIMARY OSTEOARTHRITIS OF BOTH HANDS: ICD-10-CM

## 2022-08-29 DIAGNOSIS — M72.0 DUPUYTREN'S DISEASE OF PALM OF LEFT HAND: ICD-10-CM

## 2022-08-29 DIAGNOSIS — M79.641 RIGHT HAND PAIN: ICD-10-CM

## 2022-08-29 DIAGNOSIS — M79.642 LEFT HAND PAIN: ICD-10-CM

## 2022-08-29 DIAGNOSIS — M18.0 PRIMARY OSTEOARTHRITIS OF BOTH FIRST CARPOMETACARPAL JOINTS: ICD-10-CM

## 2022-08-29 PROCEDURE — G8536 NO DOC ELDER MAL SCRN: HCPCS | Performed by: ORTHOPAEDIC SURGERY

## 2022-08-29 PROCEDURE — 99203 OFFICE O/P NEW LOW 30 MIN: CPT | Performed by: ORTHOPAEDIC SURGERY

## 2022-08-29 PROCEDURE — G8756 NO BP MEASURE DOC: HCPCS | Performed by: ORTHOPAEDIC SURGERY

## 2022-08-29 PROCEDURE — G8417 CALC BMI ABV UP PARAM F/U: HCPCS | Performed by: ORTHOPAEDIC SURGERY

## 2022-08-29 PROCEDURE — 1101F PT FALLS ASSESS-DOCD LE1/YR: CPT | Performed by: ORTHOPAEDIC SURGERY

## 2022-08-29 PROCEDURE — 1090F PRES/ABSN URINE INCON ASSESS: CPT | Performed by: ORTHOPAEDIC SURGERY

## 2022-08-29 PROCEDURE — 1123F ACP DISCUSS/DSCN MKR DOCD: CPT | Performed by: ORTHOPAEDIC SURGERY

## 2022-08-29 PROCEDURE — 3017F COLORECTAL CA SCREEN DOC REV: CPT | Performed by: ORTHOPAEDIC SURGERY

## 2022-08-29 PROCEDURE — G9899 SCRN MAM PERF RSLTS DOC: HCPCS | Performed by: ORTHOPAEDIC SURGERY

## 2022-08-29 PROCEDURE — G8432 DEP SCR NOT DOC, RNG: HCPCS | Performed by: ORTHOPAEDIC SURGERY

## 2022-08-29 PROCEDURE — G8427 DOCREV CUR MEDS BY ELIG CLIN: HCPCS | Performed by: ORTHOPAEDIC SURGERY

## 2022-08-29 RX ORDER — METHYLPREDNISOLONE 4 MG/1
TABLET ORAL
Qty: 1 DOSE PACK | Refills: 0 | Status: SHIPPED | OUTPATIENT
Start: 2022-08-29 | End: 2022-11-04 | Stop reason: ALTCHOICE

## 2022-08-29 NOTE — PROGRESS NOTES
HPI: Mallorie Ascencio (: 1950) is a 67 y.o. female, patient, here for evaluation of the following chief complaint(s):    Hand Pain (Bilateral hand pain. 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 She has developed a cyst to the ulnar aspect of her wrist that she states has been there for many years. This is just distal and beneath the ulnar head. She also is developing very small area of Dupuytren nodule and cord formation to the left middle finger ray more than the right side. She has not developed any digital clicking or locking. She also complains of some basal joint arthritic pain bilaterally and is seen for further treatment. Vitals:  Ht 5' 1\" (1.549 m)   Wt 150 lb (68 kg)   LMP 2004   BMI 28.34 kg/m²    Body mass index is 28.34 kg/m². Allergies   Allergen Reactions    Penicillin G Hives       Current Outpatient Medications   Medication Sig    methylPREDNISolone (MEDROL DOSEPACK) 4 mg tablet Per dose pack instructions    fluticasone propionate (FLONASE) 50 mcg/actuation nasal spray USE 2 SPRAYS IN EACH NOSTRIL DAILY AS NEEDED FOR RHINITIS    raloxifene (EVISTA) 60 mg tablet Take 1 Tablet by mouth daily. ibuprofen (MOTRIN) 800 mg tablet Take 800 mg by mouth every six (6) hours as needed. multivit-min/iron/folic/lutein (CENTRUM SILVER WOMEN PO) Take 1 Tab by mouth daily. ASPIRIN PO Take 81 mg by mouth every evening. coated    lisinopril (PRINIVIL, ZESTRIL) 5 mg tablet Take 5 mg by mouth every evening. BIOTIN PO Take 10,000 mcg by mouth daily. CALCIUM CARBONATE/VITAMIN D3 (CALCIUM + D PO) Take 600 mg by mouth two (2) times a day. ibuprofen (MOTRIN) 800 mg tablet Take 1 Tablet by mouth three (3) times daily. cetirizine (ZYRTEC) 10 mg tablet TAKE 1 TABLET DAILY    desoximetasone (TOPICORT) 0.25 % topical cream Apply  to affected area two (2) times daily as needed for Skin Irritation.      No current facility-administered medications for this visit. Past Medical History:   Diagnosis Date    Bicuspid aortic valve     leaky valve    Fibroid uterus     Hypertension     Obstructive sleep apnea on CPAP 2017    Osteoporosis     osteopenia now per pt per last 2 bone densities    Thyroid disease     hx of synthroid use/thyroid bloodwork normal now        Past Surgical History:   Procedure Laterality Date    COLONOSCOPY N/A 5/7/2018    COLONOSCOPY performed by Jose Hutson MD at [facility] ENDOSCOPY    ENDOSCOPY, COLON, DIAGNOSTIC      HX GYN      left ovarian cyst removed    HX GYN  12/2018, 8/2020    D&C    HX KNEE ARTHROSCOPY Bilateral     torn meniscus    HX ORTHOPAEDIC      cyst left hand    HX ORTHOPAEDIC      bakers cyst    HX ORTHOPAEDIC  10/28/2020    left knee- mensicus repair    HX TONSILLECTOMY         Family History   Problem Relation Age of Onset    Heart Disease Mother     Cancer Mother 80        colorectal cancer    Cancer Father         neck    No Known Problems Sister     No Known Problems Sister     Cancer Maternal Grandmother         ?colon cancer    Cancer Paternal Grandmother         ?colon cancer    Cancer Other         breast - cousins    Heart Disease Other         cousins        Social History     Tobacco Use    Smoking status: Never    Smokeless tobacco: Never   Vaping Use    Vaping Use: Never used   Substance Use Topics    Alcohol use: Yes     Alcohol/week: 0.0 standard drinks     Comment: occasionally - very little    Drug use: No        Review of Systems   All other systems reviewed and are negative. Physical Exam    Patient does have some pain and crepitation with grind testing of each thumb carpometacarpal joint. She has a Dupuytren nodule in the distal palmar crease over the middle finger ray without locking.   She has a cyst about a centimeter and 1/2 to 2 cm in size in the fovea interval between the ECU and FCU tendons ulnarly in the left wrist.  No complaints with right wrist and hand although she does have advanced thumb CMC and some IP joint arthritis. Imaging:    XR Results (most recent):  Results from Appointment encounter on 08/29/22    XR HAND BILAT AP LAT OBL (MIN 3 V)    Narrative  AP, lateral and oblique x-ray of both hands show bilateral thumb carpometacarpal joint osteoarthritis with collapse, sclerosis, osteophyte formation with lesser involvement of the index middle MP joints both hands and DIP a little bit more narrowing and sclerosis in the right than left especially middle and index fingers. No fractures. ASSESSMENT/PLAN:  Below is the assessment and plan developed based on review of pertinent history, physical exam, labs, studies, and medications. Patient's examination was consistent with bilateral thumb CMC basal joint osteoarthritis. She also has an ulnar located cyst involving the left wrist and a thickened pretendinous cord of Dupuytren's origin in the left palm. This extends to the middle finger ray without contracture. She was offered but deferred an aspiration or injection. We spoke to her regarding surgical options that she might consider in 2023. This can include a left thumb CMC arthroplasty with FCR tendon transfer, left ulnar wrist ganglion cyst excision and left middle finger ray palmar fasciectomy. I reviewed risks that include but are not limited to stiffness, pain, nerve or tendon damage and incomplete relief of pain. She is giving careful consideration of surgical management and will advise the office later in the year if she wants to proceed with surgical treatment. She does agree to trial of Medrol Dosepak in the interim. 1. Ganglion cyst of wrist, left  -     XR WRIST LT AP/LAT/OBL MIN 3V; Future  2. Left hand pain  -     XR HAND BILAT AP LAT OBL (MIN 3 V); Future  3. Right hand pain  -     XR HAND BILAT AP LAT OBL (MIN 3 V); Future  4.  Primary osteoarthritis of both hands  -     methylPREDNISolone (MEDROL DOSEPACK) 4 mg tablet; Per dose pack instructions, Normal, Disp-1 Dose Pack, R-0  5. Primary osteoarthritis of both first carpometacarpal joints  6. Dupuytren's disease of palm of left hand      Return in about 4 weeks (around 9/26/2022). An electronic signature was used to authenticate this note.   -- Flo Casey MD

## 2022-08-29 NOTE — PATIENT INSTRUCTIONS
Learning About Arthritis at the [facility] of the Thumb  What is it? Arthritis at the base of the thumb joint is wear and tear on the cartilage. Cartilage is a firm, thick, slippery tissue. It covers and protects the ends of bones where they meet to form a joint. When you have arthritis, there are changes in the cartilage that cause it to break down. The bones rub together and cause joint damage and pain. What causes it? Experts don't know what causes arthritis at the base of the thumb. But aging, a lot of use, an injury, or family history may play a part. What are the symptoms? Symptoms of arthritis at the base of the thumb include aching in your joint. Or the pain may feel burning or sharp. You may feel clicking, creaking, or catching in the joint. It may get stiff. You may have more pain and less strength when you pinch or  things. Symptoms may come and go, stay the same, or get worse over time. How is it diagnosed? Your doctor can often diagnose arthritis by asking you questions about your joint pain and other symptoms and examining you. You may also have X-rays and blood tests. Blood tests can help make sure another disease isn't causing your symptoms. How is it treated? Arthritis at the base of your thumb may be treated with rest, pain relievers, steroid medicines, using a brace or splint, and--in some cases--surgery. To help relieve pain in the joint, rest your sore hand. Switch hands for some activities. You can try heat and cold therapy, such as hot compresses, paraffin wax, cold packs, or ice massage. Your doctor may give you a splint to wear during some activities or when pain flares up. You can often manage mild or moderate arthritis pain with over-the-counter pain relievers. These include medicines that reduce swelling, such as ibuprofen or naproxen. You can also use acetaminophen. Sometimes these medicines are in creams that you can rub on your thumb and hand.  Your doctor may also prescribe other medicine for your pain. For some people, steroid shots may be an option. If none of the treatments work, your doctor may discuss surgery with you. Follow-up care is a key part of your treatment and safety. Be sure to make and go to all appointments, and call your doctor if you are having problems. It's also a good idea to know your test results and keep a list of the medicines you take. Where can you learn more? Go to http://www.gray.com/  Enter T110 in the search box to learn more about \"Learning About Arthritis at the [facility] of the Thumb. \"  Current as of: December 20, 2021               Content Version: 13.2  © 0015-4887 [employer]. Care instructions adapted under license by [employer] (which disclaims liability or warranty for this information). If you have questions about a medical condition or this instruction, always ask your healthcare professional. [address] any warranty or liability for your use of this information.

## 2022-11-04 ENCOUNTER — OFFICE VISIT (OUTPATIENT)
Dept: FAMILY MEDICINE CLINIC | Age: 72
End: 2022-11-04
Payer: MEDICARE

## 2022-11-04 VITALS
HEIGHT: 61 IN | RESPIRATION RATE: 12 BRPM | WEIGHT: 146.4 LBS | SYSTOLIC BLOOD PRESSURE: 131 MMHG | DIASTOLIC BLOOD PRESSURE: 68 MMHG | TEMPERATURE: 98.9 F | OXYGEN SATURATION: 99 % | HEART RATE: 61 BPM | BODY MASS INDEX: 27.64 KG/M2

## 2022-11-04 DIAGNOSIS — Z00.00 MEDICARE ANNUAL WELLNESS VISIT, SUBSEQUENT: Primary | ICD-10-CM

## 2022-11-04 DIAGNOSIS — E78.00 HYPERCHOLESTEROLEMIA: ICD-10-CM

## 2022-11-04 DIAGNOSIS — Q23.1 BICUSPID AORTIC VALVE: ICD-10-CM

## 2022-11-04 DIAGNOSIS — I10 ESSENTIAL HYPERTENSION: ICD-10-CM

## 2022-11-04 DIAGNOSIS — G47.33 OBSTRUCTIVE SLEEP APNEA ON CPAP: ICD-10-CM

## 2022-11-04 DIAGNOSIS — Z99.89 OBSTRUCTIVE SLEEP APNEA ON CPAP: ICD-10-CM

## 2022-11-04 DIAGNOSIS — Z23 NEEDS FLU SHOT: ICD-10-CM

## 2022-11-04 DIAGNOSIS — Z51.81 ENCOUNTER FOR MEDICATION MONITORING: ICD-10-CM

## 2022-11-04 DIAGNOSIS — Z12.11 COLON CANCER SCREENING: ICD-10-CM

## 2022-11-04 PROCEDURE — G0463 HOSPITAL OUTPT CLINIC VISIT: HCPCS | Performed by: FAMILY MEDICINE

## 2022-11-04 PROCEDURE — G0439 PPPS, SUBSEQ VISIT: HCPCS | Performed by: FAMILY MEDICINE

## 2022-11-04 PROCEDURE — 1123F ACP DISCUSS/DSCN MKR DOCD: CPT | Performed by: FAMILY MEDICINE

## 2022-11-04 PROCEDURE — 1090F PRES/ABSN URINE INCON ASSESS: CPT | Performed by: FAMILY MEDICINE

## 2022-11-04 PROCEDURE — G8417 CALC BMI ABV UP PARAM F/U: HCPCS | Performed by: FAMILY MEDICINE

## 2022-11-04 PROCEDURE — G8754 DIAS BP LESS 90: HCPCS | Performed by: FAMILY MEDICINE

## 2022-11-04 PROCEDURE — 3017F COLORECTAL CA SCREEN DOC REV: CPT | Performed by: FAMILY MEDICINE

## 2022-11-04 PROCEDURE — 90694 VACC AIIV4 NO PRSRV 0.5ML IM: CPT | Performed by: FAMILY MEDICINE

## 2022-11-04 PROCEDURE — 3074F SYST BP LT 130 MM HG: CPT | Performed by: FAMILY MEDICINE

## 2022-11-04 PROCEDURE — G9899 SCRN MAM PERF RSLTS DOC: HCPCS | Performed by: FAMILY MEDICINE

## 2022-11-04 PROCEDURE — G8536 NO DOC ELDER MAL SCRN: HCPCS | Performed by: FAMILY MEDICINE

## 2022-11-04 PROCEDURE — G8752 SYS BP LESS 140: HCPCS | Performed by: FAMILY MEDICINE

## 2022-11-04 PROCEDURE — G8510 SCR DEP NEG, NO PLAN REQD: HCPCS | Performed by: FAMILY MEDICINE

## 2022-11-04 PROCEDURE — G8427 DOCREV CUR MEDS BY ELIG CLIN: HCPCS | Performed by: FAMILY MEDICINE

## 2022-11-04 PROCEDURE — 99213 OFFICE O/P EST LOW 20 MIN: CPT | Performed by: FAMILY MEDICINE

## 2022-11-04 PROCEDURE — 1101F PT FALLS ASSESS-DOCD LE1/YR: CPT | Performed by: FAMILY MEDICINE

## 2022-11-04 PROCEDURE — 3078F DIAST BP <80 MM HG: CPT | Performed by: FAMILY MEDICINE

## 2022-11-04 NOTE — PATIENT INSTRUCTIONS
Vaccine Information Statement    Influenza (Flu) Vaccine (Inactivated or Recombinant): What You Need to Know    Many vaccine information statements are available in Mongolian and other languages. See www.immunize.org/vis. Hojas de información sobre vacunas están disponibles en español y en muchos otros idiomas. Visite www.immunize.org/vis. 1. Why get vaccinated? Influenza vaccine can prevent influenza (flu). Flu is a contagious disease that spreads around the [facility] every year, usually between October and May. Anyone can get the flu, but it is more dangerous for some people. Infants and young children, people 72 years and older, pregnant people, and people with certain health conditions or a weakened immune system are at greatest risk of flu complications. Pneumonia, bronchitis, sinus infections, and ear infections are examples of flu-related complications. If you have a medical condition, such as heart disease, cancer, or diabetes, flu can make it worse. Flu can cause fever and chills, sore throat, muscle aches, fatigue, cough, headache, and runny or stuffy nose. Some people may have vomiting and diarrhea, though this is more common in children than adults. In an average year, thousands of people in the [facility] die from flu, and many more are hospitalized. Flu vaccine prevents millions of illnesses and flu-related visits to the doctor each year. 2. Influenza vaccines     CDC recommends everyone 6 months and older get vaccinated every flu season. Children 6 months through 6years of age may need 2 doses during a single flu season. Everyone else needs only 1 dose each flu season. It takes about 2 weeks for protection to develop after vaccination. There are many flu viruses, and they are always changing. Each year a new flu vaccine is made to protect against the influenza viruses believed to be likely to cause disease in the upcoming flu season.  Even when the vaccine doesnt exactly match these viruses, it may still provide some protection. Influenza vaccine does not cause flu. Influenza vaccine may be given at the same time as other vaccines. 3. Talk with your health care provider    Tell your vaccination provider if the person getting the vaccine:  Has had an allergic reaction after a previous dose of influenza vaccine, or has any severe, life-threatening allergies   Has ever had Guillain-Barré Syndrome (also called GBS)    In some cases, your health care provider may decide to postpone influenza vaccination until a future visit. Influenza vaccine can be administered at any time during pregnancy. People who are or will be pregnant during influenza season should receive inactivated influenza vaccine. People with minor illnesses, such as a cold, may be vaccinated. People who are moderately or severely ill should usually wait until they recover before getting influenza vaccine. Your health care provider can give you more information. 4. Risks of a vaccine reaction    Soreness, redness, and swelling where the shot is given, fever, muscle aches, and headache can happen after influenza vaccination. There may be a very small increased risk of Guillain-Barré Syndrome (GBS) after inactivated influenza vaccine (the flu shot). Francoise Salcido children who get the flu shot along with pneumococcal vaccine (PCV13) and/or DTaP vaccine at the same time might be slightly more likely to have a seizure caused by fever. Tell your health care provider if a child who is getting flu vaccine has ever had a seizure. People sometimes faint after medical procedures, including vaccination. Tell your provider if you feel dizzy or have vision changes or ringing in the ears. As with any medicine, there is a very remote chance of a vaccine causing a severe allergic reaction, other serious injury, or death. 5. What if there is a serious problem?     An allergic reaction could occur after the vaccinated person leaves the clinic. If you see signs of a severe allergic reaction (hives, swelling of the face and throat, difficulty breathing, a fast heartbeat, dizziness, or weakness), call 9-1-1 and get the person to the nearest hospital.    For other signs that concern you, call your health care provider. Adverse reactions should be reported to the Vaccine Adverse Event Reporting System (VAERS). Your health care provider will usually file this report, or you can do it yourself. Visit the VAERS website at www.vaers. [facility].gov or call 7-624.451.4054. VAERS is only for reporting reactions, and VAERS staff members do not give medical advice. 6. The National Vaccine Injury Compensation Program    The [facility] Vaccine Injury Compensation Program (VICP) is a federal program that was created to compensate people who may have been injured by certain vaccines. Claims regarding alleged injury or death due to vaccination have a time limit for filing, which may be as short as two years. Visit the VICP website at www.[facility] [[facility]]a.gov/vaccinecompensation or call 4-609.852.5681 to learn about the program and about filing a claim. 7. How can I learn more? Ask your health care provider. Call your local or state health department. Visit the website of the Food and Drug Administration (FDA) for vaccine package inserts and additional information at www.fda.gov/vaccines-blood-biologics/vaccines. Contact the Centers for Disease Control and Prevention (CDC): Call 0-550.839.2159 (8-098-IIX-INFO) or  Visit CDCs influenza website at www.cdc.gov/flu. Vaccine Information Statement   Inactivated Influenza Vaccine   8/6/2021  42 QUEENIE Schmid [id number]   Department of Health and Human Services  Centers for Disease Control and Prevention    Office Use Only

## 2022-11-04 NOTE — PROGRESS NOTES
HISTORY OF PRESENT ILLNESS  Nishi Gonzales is a 67 y.o. female. HPI   Follow up on chronic medical problems. Overall feeling well. Doing the precautionary measures at home to reduce risks of exposure COVID19. Also wearing mask when she is going out. No known sick contacts or known exposure to [address]. BP follow up:  Has hypertension and bicuspid aortic valve followed by cardiology. Has follow up with cardiology for this month. Compliant w/ medication, low salt diet, and exercise. BP home monitoring 130s/70s. No swelling, headache or dizziness. No chest pain, SOB, palpitations    HM:  Mammo: 1/25/22  Colonoscopy: 5/2018 Dr. Heide Andersen. Repeat in 5 years. Patient Active Problem List   Diagnosis Code    Osteoporosis M81.0    Bicuspid aortic valve Q23.1    H/O: hypothyroidism Z86.39    Essential hypertension I10    Encounter for medication monitoring Z51.81       Current Outpatient Medications   Medication Sig Dispense Refill    methylPREDNISolone (MEDROL DOSEPACK) 4 mg tablet Per dose pack instructions 1 Dose Pack 0    fluticasone propionate (FLONASE) 50 mcg/actuation nasal spray USE 2 SPRAYS IN EACH NOSTRIL DAILY AS NEEDED FOR RHINITIS 48 g 3    ibuprofen (MOTRIN) 800 mg tablet Take 1 Tablet by mouth three (3) times daily. 90 Tablet 1    raloxifene (EVISTA) 60 mg tablet Take 1 Tablet by mouth daily. 90 Tablet 3    cetirizine (ZYRTEC) 10 mg tablet TAKE 1 TABLET DAILY 90 Tablet 3    desoximetasone (TOPICORT) 0.25 % topical cream Apply  to affected area two (2) times daily as needed for Skin Irritation. 60 g 1    ibuprofen (MOTRIN) 800 mg tablet Take 800 mg by mouth every six (6) hours as needed. multivit-min/iron/folic/lutein (CENTRUM SILVER WOMEN PO) Take 1 Tab by mouth daily. ASPIRIN PO Take 81 mg by mouth every evening. coated      lisinopril (PRINIVIL, ZESTRIL) 5 mg tablet Take 5 mg by mouth every evening. BIOTIN PO Take 10,000 mcg by mouth daily.       CALCIUM CARBONATE/VITAMIN D3 (CALCIUM + D PO) Take 600 mg by mouth two (2) times a day. Allergies   Allergen Reactions    Penicillin G Hives         Past Medical History:   Diagnosis Date    Bicuspid aortic valve     leaky valve    Fibroid uterus     Hypertension     Obstructive sleep apnea on CPAP 2017    Osteoporosis     osteopenia now per pt per last 2 bone densities    Thyroid disease     hx of synthroid use/thyroid bloodwork normal now         Past Surgical History:   Procedure Laterality Date    COLONOSCOPY N/A 5/7/2018    COLONOSCOPY performed by Rosemary Howell. Catherine Parsons MD at [facility] ENDOSCOPY    ENDOSCOPY, COLON, DIAGNOSTIC      HX GYN      left ovarian cyst removed    HX GYN  12/2018, 8/2020    D&C    HX KNEE ARTHROSCOPY Bilateral     torn meniscus    HX ORTHOPAEDIC      cyst left hand    HX ORTHOPAEDIC      bakers cyst    HX ORTHOPAEDIC  10/28/2020    left knee- mensicus repair    HX TONSILLECTOMY             Family History   Problem Relation Age of Onset    Heart Disease Mother     Cancer Mother 80        colorectal cancer    Cancer Father         neck    No Known Problems Sister     No Known Problems Sister     Cancer Maternal Grandmother         ?colon cancer    Cancer Paternal Grandmother         ?colon cancer    Cancer Other         breast - cousins    Heart Disease Other         cousins       Social History     Tobacco Use    Smoking status: Never    Smokeless tobacco: Never   Substance Use Topics    Alcohol use:  Yes     Alcohol/week: 0.0 standard drinks     Comment: occasionally - very little        Lab Results   Component Value Date/Time    WBC 5.5 11/02/2021 10:24 AM    HGB 12.1 11/02/2021 10:24 AM    HCT 37.9 11/02/2021 10:24 AM    PLATELET 850 76/43/3323 10:24 AM    MCV 96.7 11/02/2021 10:24 AM     Lab Results   Component Value Date/Time    Cholesterol, total 226 (H) 11/02/2021 10:24 AM    HDL Cholesterol 68 11/02/2021 10:24 AM    LDL, calculated 142.2 (H) 11/02/2021 10:24 AM    Triglyceride 79 11/02/2021 10:24 AM CHOL/HDL Ratio 3.3 11/02/2021 10:24 AM     Lab Results   Component Value Date/Time    TSH 1.890 02/28/2018 10:21 AM    T4, Free 1.32 02/22/2017 11:53 AM      Lab Results   Component Value Date/Time    Sodium 140 11/02/2021 10:24 AM    Potassium 5.0 11/02/2021 10:24 AM    Chloride 111 (H) 11/02/2021 10:24 AM    CO2 27 11/02/2021 10:24 AM    Anion gap 2 (L) 11/02/2021 10:24 AM    Glucose 100 11/02/2021 10:24 AM    BUN 18 11/02/2021 10:24 AM    Creatinine 0.85 11/02/2021 10:24 AM    BUN/Creatinine ratio 21 (H) 11/02/2021 10:24 AM    GFR est AA >60 11/02/2021 10:24 AM    GFR est non-AA >60 11/02/2021 10:24 AM    Calcium 9.9 11/02/2021 10:24 AM    Bilirubin, total 0.3 11/02/2021 10:24 AM    ALT (SGPT) 23 11/02/2021 10:24 AM    Alk. phosphatase 65 11/02/2021 10:24 AM    Protein, total 7.2 11/02/2021 10:24 AM    Albumin 3.8 11/02/2021 10:24 AM    Globulin 3.4 11/02/2021 10:24 AM    A-G Ratio 1.1 11/02/2021 10:24 AM      Lab Results   Component Value Date/Time    Hemoglobin A1c 5.8 (H) 01/07/2014 11:52 AM    Hemoglobin A1c (POC) 5.3 01/26/2016 10:30 AM         Review of Systems   Constitutional:  Negative for malaise/fatigue. HENT:  Negative for congestion. Eyes:  Negative for blurred vision. Respiratory:  Negative for cough and shortness of breath. Cardiovascular:  Negative for chest pain, palpitations and leg swelling. Gastrointestinal:  Negative for abdominal pain, constipation and heartburn. Genitourinary:  Negative for dysuria, frequency and urgency. Musculoskeletal:  Negative for back pain and joint pain. Neurological:  Negative for dizziness, tingling and headaches. Endo/Heme/Allergies:  Negative for environmental allergies. Psychiatric/Behavioral:  Negative for depression. The patient does not have insomnia. Physical Exam  Vitals and nursing note reviewed. Constitutional:       Appearance: Normal appearance. She is well-developed.       Comments: /68   Pulse 61   Temp 98.9 °F (37.2 °C)   Resp 12   Ht 5' 1\" (1.549 m)   Wt 146 lb 6.4 oz (66.4 kg)   LMP 01/01/2004   SpO2 99%   BMI 27.66 kg/m²    HENT:      Right Ear: Tympanic membrane and ear canal normal.      Left Ear: Tympanic membrane and ear canal normal.   Neck:      Thyroid: No thyromegaly. Cardiovascular:      Rate and Rhythm: Normal rate and regular rhythm. Heart sounds: Murmur heard. Systolic murmur is present with a grade of 2/6. Pulmonary:      Effort: Pulmonary effort is normal.      Breath sounds: Normal breath sounds. Abdominal:      General: Bowel sounds are normal.      Palpations: Abdomen is soft. There is no mass. Tenderness: There is no abdominal tenderness. Musculoskeletal:         General: Normal range of motion. Cervical back: Normal range of motion and neck supple. Right lower leg: No edema. Left lower leg: No edema. Lymphadenopathy:      Cervical: No cervical adenopathy. Skin:     General: Skin is warm and dry. Neurological:      General: No focal deficit present. Mental Status: She is alert and oriented to person, place, and time. Psychiatric:         Mood and Affect: Mood normal.     ASSESSMENT and PLAN  Diagnoses and all orders for this visit:    1. Medicare annual wellness visit, subsequent    2. Essential hypertension  Stable     3. Hypercholesterolemia  Continue to monitor. Work on diet and exercise.  -     LIPID PANEL; Future    4. Bicuspid aortic valve  Follow up with cardiology as planned. 5. Obstructive sleep apnea on CPAP  Stable     6. Encounter for medication monitoring  -     CBC W/O DIFF; Future  -     METABOLIC PANEL, COMPREHENSIVE; Future  -     URINALYSIS W/MICROSCOPIC; Future      reviewed diet, exercise and weight control  cardiovascular risk and specific lipid/LDL goals reviewed  reviewed medications and side effects in detail    I have discussed diagnosis listed in this note with pt and/or family.  I have discussed treatment plans and options and the risk/benefit analysis of those options, including safe use of medications and possible medication side effects. Through the use of shared decision making we have agreed to the above plan. The patient has received an after-visit summary and questions were answered concerning future plans and follow up. Advise pt of any urgent changes then to proceed to the ER.

## 2022-11-04 NOTE — PROGRESS NOTES
This is a Subsequent Medicare Annual Wellness Exam (AWV) (Performed 12 months after IPPE or effective date of Medicare Part B enrollment)    I have reviewed the patient's medical history in detail and updated the computerized patient record. History     Patient Active Problem List   Diagnosis Code    Osteoporosis M81.0    Bicuspid aortic valve Q23.1    H/O: hypothyroidism Z86.39    Essential hypertension I10    Encounter for medication monitoring Z51.81       Current Outpatient Medications   Medication Sig Dispense Refill    methylPREDNISolone (MEDROL DOSEPACK) 4 mg tablet Per dose pack instructions 1 Dose Pack 0    fluticasone propionate (FLONASE) 50 mcg/actuation nasal spray USE 2 SPRAYS IN EACH NOSTRIL DAILY AS NEEDED FOR RHINITIS 48 g 3    ibuprofen (MOTRIN) 800 mg tablet Take 1 Tablet by mouth three (3) times daily. 90 Tablet 1    raloxifene (EVISTA) 60 mg tablet Take 1 Tablet by mouth daily. 90 Tablet 3    cetirizine (ZYRTEC) 10 mg tablet TAKE 1 TABLET DAILY 90 Tablet 3    desoximetasone (TOPICORT) 0.25 % topical cream Apply  to affected area two (2) times daily as needed for Skin Irritation. 60 g 1    ibuprofen (MOTRIN) 800 mg tablet Take 800 mg by mouth every six (6) hours as needed. multivit-min/iron/folic/lutein (CENTRUM SILVER WOMEN PO) Take 1 Tab by mouth daily. ASPIRIN PO Take 81 mg by mouth every evening. coated      lisinopril (PRINIVIL, ZESTRIL) 5 mg tablet Take 5 mg by mouth every evening. BIOTIN PO Take 10,000 mcg by mouth daily. CALCIUM CARBONATE/VITAMIN D3 (CALCIUM + D PO) Take 600 mg by mouth two (2) times a day.          Allergies   Allergen Reactions    Penicillin G Hives         Past Medical History:   Diagnosis Date    Bicuspid aortic valve     leaky valve    Fibroid uterus     Hypertension     Obstructive sleep apnea on CPAP 2017    Osteoporosis     osteopenia now per pt per last 2 bone densities    Thyroid disease     hx of synthroid use/thyroid bloodwork normal now         Past Surgical History:   Procedure Laterality Date    COLONOSCOPY N/A 5/7/2018    COLONOSCOPY performed by Avery Delcid. Noy Hui MD at [facility] ENDOSCOPY    ENDOSCOPY, COLON, DIAGNOSTIC      HX GYN      left ovarian cyst removed    HX GYN  12/2018, 8/2020    D&C    HX KNEE ARTHROSCOPY Bilateral     torn meniscus    HX ORTHOPAEDIC      cyst left hand    HX ORTHOPAEDIC      bakers cyst    HX ORTHOPAEDIC  10/28/2020    left knee- mensicus repair    HX TONSILLECTOMY           Family History   Problem Relation Age of Onset    Heart Disease Mother     Cancer Mother 80        colorectal cancer    Cancer Father         neck    No Known Problems Sister     No Known Problems Sister     Cancer Maternal Grandmother         ?colon cancer    Cancer Paternal Grandmother         ?colon cancer    Cancer Other         breast - cousins    Heart Disease Other         cousins       Social History     Tobacco Use    Smoking status: Never    Smokeless tobacco: Never   Substance Use Topics    Alcohol use: Yes     Alcohol/week: 0.0 standard drinks     Comment: occasionally - very little         Depression Risk Factor Screening:     PHQ over the last two weeks    Little interest or pleasure in doing things Not at all   Feeling down, depressed or hopeless Not at all   Total Score PHQ 2 0     Alcohol Risk Factor Screening: You do not drink alcohol or very rarely. Functional Ability and Level of Safety:   Hearing Loss  Hearing is good. Activities of Daily Living  The home contains: discussed safety equipment. Patient does total self care    Fall RiskFall Risk Assessment, last 12 mths    Able to walk? Yes   Fall in past 12 months? No     Functional Ability:   Does the patient exhibit a steady gait? yes    How long did it take the patient to get up and walk from a sitting position? seconds    Is the patient self reliant? (ie can do own laundry, meals, household chores)  yes   Does the patient handle his/her own medications?   yes Does the patient handle his/her own money? yes   Is the patients home safe (ie good lighting, handrails on stairs and bath, etc.)? yes   Did you notice or did patient express any hearing difficulties? no   Did you notice or did patient express any vision difficulties? no        Advance Care Planning:   Patient was offered the opportunity to discuss advance care planning:  yes    Does patient have an Advance Directive:  no   If no, did you provide information on Caring Connections? yes      Abuse Screen  Patient is not abused    Cognitive Screening   Evaluation of Cognitive Function:  Has your family/caregiver stated any concerns about your memory: no      Patient Care Team   Patient Care Team:  Kwame Enriquez MD as PCP - General    Assessment/Plan   Education and counseling provided:  Are appropriate based on today's review and evaluation  End-of-Life planning (with patient's consent)    ASSESSMENT and PLAN    Medicare Annual Wellness  Continue current treatment plan. Continue annual follow up. I have discussed diagnosis listed in this note with pt and/or family. I have discussed treatment plans and options and the risk/benefit analysis of those options, including safe use of medications and possible medication side effects. Through the use of shared decision making we have agreed to the above plan. The patient has received an after-visit summary and questions were answered concerning future plans and follow up. Advise pt of any urgent changes then to proceed to the ER.

## 2022-11-04 NOTE — PROGRESS NOTES
Patient identified by 2 identifiers. Chief Complaint   Patient presents with    Annual Wellness Visit     1. Have you been to the ER, urgent care clinic since your last visit? Hospitalized since your last visit? No    2. Have you seen or consulted any other health care providers outside of the [address] [place] [address] since your last visit? Include any pap smears or colon screening. No  Verbal Order with Readback given by William Mata MD for Influenza. Given in left Deltoid without difficulty.

## 2022-11-05 LAB
ALBUMIN SERPL-MCNC: 4 G/DL (ref 3.5–5)
ALBUMIN/GLOB SERPL: 1.2 {RATIO} (ref 1.1–2.2)
ALP SERPL-CCNC: 52 U/L (ref 45–117)
ALT SERPL-CCNC: 34 U/L (ref 12–78)
ANION GAP SERPL CALC-SCNC: 5 MMOL/L (ref 5–15)
APPEARANCE UR: CLEAR
AST SERPL-CCNC: 17 U/L (ref 15–37)
BACTERIA URNS QL MICRO: NEGATIVE /HPF
BILIRUB SERPL-MCNC: 0.3 MG/DL (ref 0.2–1)
BILIRUB UR QL: NEGATIVE
BUN SERPL-MCNC: 14 MG/DL (ref 6–20)
BUN/CREAT SERPL: 16 (ref 12–20)
CALCIUM SERPL-MCNC: 10.2 MG/DL (ref 8.5–10.1)
CHLORIDE SERPL-SCNC: 105 MMOL/L (ref 97–108)
CHOLEST SERPL-MCNC: 200 MG/DL
CO2 SERPL-SCNC: 30 MMOL/L (ref 21–32)
COLOR UR: NORMAL
CREAT SERPL-MCNC: 0.85 MG/DL (ref 0.55–1.02)
EPITH CASTS URNS QL MICRO: NORMAL /LPF
ERYTHROCYTE [DISTWIDTH] IN BLOOD BY AUTOMATED COUNT: 12.9 % (ref 11.5–14.5)
GLOBULIN SER CALC-MCNC: 3.3 G/DL (ref 2–4)
GLUCOSE SERPL-MCNC: 91 MG/DL (ref 65–100)
GLUCOSE UR STRIP.AUTO-MCNC: NEGATIVE MG/DL
HCT VFR BLD AUTO: 40.3 % (ref 35–47)
HDLC SERPL-MCNC: 53 MG/DL
HDLC SERPL: 3.8 {RATIO} (ref 0–5)
HGB BLD-MCNC: 12.9 G/DL (ref 11.5–16)
HGB UR QL STRIP: NEGATIVE
HYALINE CASTS URNS QL MICRO: NORMAL /LPF (ref 0–5)
KETONES UR QL STRIP.AUTO: NEGATIVE MG/DL
LDLC SERPL CALC-MCNC: 119.2 MG/DL (ref 0–100)
LEUKOCYTE ESTERASE UR QL STRIP.AUTO: NEGATIVE
MCH RBC QN AUTO: 30.9 PG (ref 26–34)
MCHC RBC AUTO-ENTMCNC: 32 G/DL (ref 30–36.5)
MCV RBC AUTO: 96.4 FL (ref 80–99)
NITRITE UR QL STRIP.AUTO: NEGATIVE
NRBC # BLD: 0 K/UL (ref 0–0.01)
NRBC BLD-RTO: 0 PER 100 WBC
PH UR STRIP: 7 [PH] (ref 5–8)
PLATELET # BLD AUTO: 193 K/UL (ref 150–400)
PMV BLD AUTO: 10.5 FL (ref 8.9–12.9)
POTASSIUM SERPL-SCNC: 4.6 MMOL/L (ref 3.5–5.1)
PROT SERPL-MCNC: 7.3 G/DL (ref 6.4–8.2)
PROT UR STRIP-MCNC: NEGATIVE MG/DL
RBC # BLD AUTO: 4.18 M/UL (ref 3.8–5.2)
RBC #/AREA URNS HPF: NORMAL /HPF (ref 0–5)
SODIUM SERPL-SCNC: 140 MMOL/L (ref 136–145)
SP GR UR REFRACTOMETRY: 1.01 (ref 1–1.03)
TRIGL SERPL-MCNC: 139 MG/DL (ref ?–150)
UROBILINOGEN UR QL STRIP.AUTO: 0.2 EU/DL (ref 0.2–1)
VLDLC SERPL CALC-MCNC: 27.8 MG/DL
WBC # BLD AUTO: 6.4 K/UL (ref 3.6–11)
WBC URNS QL MICRO: NORMAL /HPF (ref 0–4)

## 2022-11-29 ENCOUNTER — HOSPITAL ENCOUNTER (EMERGENCY)
Age: 72
Discharge: HOME | End: 2022-11-29
Attending: EMERGENCY MEDICINE
Payer: MEDICARE

## 2022-11-29 VITALS
TEMPERATURE: 98.7 F | HEIGHT: 61 IN | OXYGEN SATURATION: 96 % | HEART RATE: 97 BPM | RESPIRATION RATE: 18 BRPM | WEIGHT: 147.93 LBS | BODY MASS INDEX: 27.93 KG/M2 | DIASTOLIC BLOOD PRESSURE: 79 MMHG | SYSTOLIC BLOOD PRESSURE: 152 MMHG

## 2022-11-29 DIAGNOSIS — B34.9 VIRAL ILLNESS: Primary | ICD-10-CM

## 2022-11-29 LAB
FLUAV AG NPH QL IA: NEGATIVE
FLUBV AG NOSE QL IA: NEGATIVE

## 2022-11-29 PROCEDURE — 96372 THER/PROPH/DIAG INJ SC/IM: CPT

## 2022-11-29 PROCEDURE — 74011250637 HC RX REV CODE- 250/637: Performed by: EMERGENCY MEDICINE

## 2022-11-29 PROCEDURE — 99284 EMERGENCY DEPT VISIT MOD MDM: CPT

## 2022-11-29 PROCEDURE — 87804 INFLUENZA ASSAY W/OPTIC: CPT

## 2022-11-29 PROCEDURE — 74011250636 HC RX REV CODE- 250/636: Performed by: EMERGENCY MEDICINE

## 2022-11-29 RX ORDER — BENZONATATE 100 MG/1
100 CAPSULE ORAL
Qty: 21 CAPSULE | Refills: 0 | Status: SHIPPED | OUTPATIENT
Start: 2022-11-29 | End: 2022-12-06

## 2022-11-29 RX ORDER — CODEINE PHOSPHATE AND GUAIFENESIN 10; 100 MG/5ML; MG/5ML
10 SOLUTION ORAL
Qty: 118 ML | Refills: 0 | Status: SHIPPED | OUTPATIENT
Start: 2022-11-29 | End: 2022-12-02

## 2022-11-29 RX ORDER — ACETAMINOPHEN 500 MG
1000 TABLET ORAL ONCE
Status: COMPLETED | OUTPATIENT
Start: 2022-11-29 | End: 2022-11-29

## 2022-11-29 RX ORDER — BENZONATATE 100 MG/1
100 CAPSULE ORAL
Status: COMPLETED | OUTPATIENT
Start: 2022-11-29 | End: 2022-11-29

## 2022-11-29 RX ORDER — KETOROLAC TROMETHAMINE 30 MG/ML
30 INJECTION, SOLUTION INTRAMUSCULAR; INTRAVENOUS ONCE
Status: COMPLETED | OUTPATIENT
Start: 2022-11-29 | End: 2022-11-29

## 2022-11-29 RX ORDER — CODEINE PHOSPHATE AND GUAIFENESIN 10; 100 MG/5ML; MG/5ML
10 SOLUTION ORAL
Status: COMPLETED | OUTPATIENT
Start: 2022-11-29 | End: 2022-11-29

## 2022-11-29 RX ADMIN — GUAIFENESIN AND CODEINE PHOSPHATE 10 ML: 100; 10 SOLUTION ORAL at 22:15

## 2022-11-29 RX ADMIN — ACETAMINOPHEN 1000 MG: 500 TABLET ORAL at 22:15

## 2022-11-29 RX ADMIN — BENZONATATE 100 MG: 100 CAPSULE ORAL at 22:15

## 2022-11-29 RX ADMIN — KETOROLAC TROMETHAMINE 30 MG: 30 INJECTION, SOLUTION INTRAMUSCULAR; INTRAVENOUS at 22:15

## 2022-11-30 NOTE — ED NOTES
Patient discharged by provider, discharge instructions provided to patient and reviewed, all questions answered. Patient A/Ox4, breathing unlabored, VSS.  Patient ambulatory on discharge

## 2022-11-30 NOTE — DISCHARGE INSTRUCTIONS
Please take tylenol and motrin for fevers and body aches as needed. Take tessalon perles and robitussin with codeine for cough. Thank you.

## 2022-11-30 NOTE — ED PROVIDER NOTES
Patient is a 79-year-old female with history of bicuspid aortic valve, fibroid uterus, hypertension, osteoporosis, sleep apnea, thyroid disease who presents with 1 day history of fevers, headache, cough, body aches. Patient reports that she was with her grandchild a few days ago, then her  started symptoms and then she began symptoms. She has received the flu vaccination, and is vaccinated and boosted for COVID. Denies any trouble breathing. No chest pain. Past Medical History:   Diagnosis Date    Bicuspid aortic valve     leaky valve    Fibroid uterus     Hypertension     Obstructive sleep apnea on CPAP 2017    Osteoporosis     osteopenia now per pt per last 2 bone densities    Thyroid disease     hx of synthroid use/thyroid bloodwork normal now       Past Surgical History:   Procedure Laterality Date    COLONOSCOPY N/A 5/7/2018    COLONOSCOPY performed by Nina Chester.  Nadia Gonzalez MD at [facility] ENDOSCOPY    ENDOSCOPY, COLON, DIAGNOSTIC      HX GYN      left ovarian cyst removed    HX GYN  12/2018, 8/2020    D&C    HX KNEE ARTHROSCOPY Bilateral     torn meniscus    HX ORTHOPAEDIC      cyst left hand    HX ORTHOPAEDIC      bakers cyst    HX ORTHOPAEDIC  10/28/2020    left knee- mensicus repair    HX TONSILLECTOMY           Family History:   Problem Relation Age of Onset    Heart Disease Mother     Cancer Mother 80        colorectal cancer    Cancer Father         neck    No Known Problems Sister     No Known Problems Sister     Cancer Maternal Grandmother         ?colon cancer    Cancer Paternal Grandmother         ?colon cancer    Cancer Other         breast - cousins    Heart Disease Other         cousins       Social History     Socioeconomic History    Marital status:      Spouse name: Not on file    Number of children: Not on file    Years of education: Not on file    Highest education level: Not on file   Occupational History    Not on file   Tobacco Use    Smoking status: Never    Smokeless tobacco: Never   Vaping Use    Vaping Use: Never used   Substance and Sexual Activity    Alcohol use: Yes     Alcohol/week: 0.0 standard drinks     Comment: occasionally - very little    Drug use: No    Sexual activity: Yes     Partners: Male     Birth control/protection: None   Other Topics Concern    Not on file   Social History Narrative    Not on file     Social Determinants of Health     Financial Resource Strain: Not on file   Food Insecurity: Not on file   Transportation Needs: Not on file   Physical Activity: Not on file   Stress: Not on file   Social Connections: Not on file   Intimate Partner Violence: Not on file   Housing Stability: Not on file         ALLERGIES: Penicillin g    Review of Systems   Constitutional:  Positive for fatigue and fever. Negative for chills. HENT:  Negative for drooling and nosebleeds. Eyes:  Negative for pain and itching. Respiratory:  Positive for cough. Negative for choking and stridor. Cardiovascular:  Negative for leg swelling. Gastrointestinal:  Negative for abdominal distention and rectal pain. Endocrine: Negative for heat intolerance and polyphagia. Genitourinary:  Negative for enuresis and genital sores. Musculoskeletal:  Negative for arthralgias and joint swelling. Skin:  Negative for color change. Allergic/Immunologic: Negative for immunocompromised state. Neurological:  Positive for headaches. Negative for tremors and speech difficulty. Hematological:  Negative for adenopathy. Psychiatric/Behavioral:  Negative for dysphoric mood and sleep disturbance. Vitals:    11/29/22 2014 11/29/22 2249   BP: (!) 152/79    Pulse: 97    Resp: 18    Temp: 100.4 °F (38 °C) 98.7 °F (37.1 °C)   SpO2: 96%    Weight: 67.1 kg (147 lb 14.9 oz)    Height: 5' 1\" (1.549 m)             Physical Exam  Vitals and nursing note reviewed. Constitutional:       General: She is not in acute distress. Appearance: She is well-developed.  She is not ill-appearing, toxic-appearing or diaphoretic. HENT:      Head: Normocephalic. Nose: Nose normal.      Mouth/Throat:      Pharynx: No oropharyngeal exudate or posterior oropharyngeal erythema. Eyes:      Conjunctiva/sclera: Conjunctivae normal.   Cardiovascular:      Rate and Rhythm: Normal rate and regular rhythm. Pulmonary:      Effort: Pulmonary effort is normal. No respiratory distress. Breath sounds: Normal breath sounds. Abdominal:      General: There is no distension. Palpations: Abdomen is soft. Musculoskeletal:         General: No deformity. Normal range of motion. Cervical back: Normal range of motion and neck supple. Skin:     General: Skin is warm and dry. Neurological:      Mental Status: She is alert and oriented to person, place, and time. Coordination: Coordination normal.   Psychiatric:         Behavior: Behavior normal.        MDM  Number of Diagnoses or Management Options  Viral illness  Diagnosis management comments: Patient is nontoxic-appearing, exam consistent with viral illness. Clear lung sounds on auscultation, no criteria for x-ray at this time. Discussed symptomatic care, and she will follow-up with the pediatrician later this week for symptom recheck. She is stable for discharge. Procedures    Patient's results have been reviewed with them. Patient and/or family have verbally conveyed their understanding and agreement of the patient's signs, symptoms, diagnosis, treatment and prognosis and additionally agree to follow up as recommended or return to the Emergency Room should their condition change prior to follow-up. Discharge instructions have also been provided to the patient with some educational information regarding their diagnosis as well a list of reasons why they would want to return to the ER prior to their follow-up appointment should their condition change.

## 2022-11-30 NOTE — ED TRIAGE NOTES
Pt reports headache, cough, fever and back pain since yesterday. Took tylenol cough and flu at 9am, no other pain or fever medication. Multiple sick contacts at home.

## 2023-02-16 RX ORDER — CETIRIZINE HYDROCHLORIDE 10 MG/1
TABLET ORAL
Qty: 90 TABLET | Refills: 3 | Status: SHIPPED | OUTPATIENT
Start: 2023-02-16

## 2023-03-27 ENCOUNTER — OFFICE VISIT (OUTPATIENT)
Dept: FAMILY MEDICINE CLINIC | Age: 73
End: 2023-03-27
Payer: MEDICARE

## 2023-03-27 VITALS
TEMPERATURE: 97.6 F | HEART RATE: 65 BPM | SYSTOLIC BLOOD PRESSURE: 131 MMHG | BODY MASS INDEX: 28.02 KG/M2 | DIASTOLIC BLOOD PRESSURE: 73 MMHG | WEIGHT: 148.4 LBS | HEIGHT: 61 IN | OXYGEN SATURATION: 98 % | RESPIRATION RATE: 12 BRPM

## 2023-03-27 DIAGNOSIS — H25.013 CORTICAL AGE-RELATED CATARACT OF BOTH EYES: ICD-10-CM

## 2023-03-27 DIAGNOSIS — E78.00 HYPERCHOLESTEROLEMIA: ICD-10-CM

## 2023-03-27 DIAGNOSIS — Z01.818 PRE-OP EXAM: Primary | ICD-10-CM

## 2023-03-27 DIAGNOSIS — Z51.81 ENCOUNTER FOR MEDICATION MONITORING: ICD-10-CM

## 2023-03-27 DIAGNOSIS — Z99.89 OBSTRUCTIVE SLEEP APNEA ON CPAP: ICD-10-CM

## 2023-03-27 DIAGNOSIS — Z91.09 ENVIRONMENTAL ALLERGIES: ICD-10-CM

## 2023-03-27 DIAGNOSIS — Q23.1 BICUSPID AORTIC VALVE: ICD-10-CM

## 2023-03-27 DIAGNOSIS — G47.33 OBSTRUCTIVE SLEEP APNEA ON CPAP: ICD-10-CM

## 2023-03-27 DIAGNOSIS — I10 ESSENTIAL HYPERTENSION: ICD-10-CM

## 2023-03-27 PROCEDURE — 1123F ACP DISCUSS/DSCN MKR DOCD: CPT | Performed by: FAMILY MEDICINE

## 2023-03-27 PROCEDURE — G8536 NO DOC ELDER MAL SCRN: HCPCS | Performed by: FAMILY MEDICINE

## 2023-03-27 PROCEDURE — G9899 SCRN MAM PERF RSLTS DOC: HCPCS | Performed by: FAMILY MEDICINE

## 2023-03-27 PROCEDURE — 99213 OFFICE O/P EST LOW 20 MIN: CPT | Performed by: FAMILY MEDICINE

## 2023-03-27 PROCEDURE — G8510 SCR DEP NEG, NO PLAN REQD: HCPCS | Performed by: FAMILY MEDICINE

## 2023-03-27 PROCEDURE — 1090F PRES/ABSN URINE INCON ASSESS: CPT | Performed by: FAMILY MEDICINE

## 2023-03-27 PROCEDURE — G8417 CALC BMI ABV UP PARAM F/U: HCPCS | Performed by: FAMILY MEDICINE

## 2023-03-27 PROCEDURE — 3075F SYST BP GE 130 - 139MM HG: CPT | Performed by: FAMILY MEDICINE

## 2023-03-27 PROCEDURE — 3017F COLORECTAL CA SCREEN DOC REV: CPT | Performed by: FAMILY MEDICINE

## 2023-03-27 PROCEDURE — 1101F PT FALLS ASSESS-DOCD LE1/YR: CPT | Performed by: FAMILY MEDICINE

## 2023-03-27 PROCEDURE — G0463 HOSPITAL OUTPT CLINIC VISIT: HCPCS | Performed by: FAMILY MEDICINE

## 2023-03-27 PROCEDURE — G8427 DOCREV CUR MEDS BY ELIG CLIN: HCPCS | Performed by: FAMILY MEDICINE

## 2023-03-27 PROCEDURE — 3078F DIAST BP <80 MM HG: CPT | Performed by: FAMILY MEDICINE

## 2023-03-27 RX ORDER — OFLOXACIN 3 MG/ML
1 SOLUTION/ DROPS OPHTHALMIC 2 TIMES DAILY
COMMUNITY
Start: 2023-03-24

## 2023-03-27 RX ORDER — METRONIDAZOLE 7.5 MG/G
1 CREAM TOPICAL 2 TIMES DAILY
COMMUNITY
Start: 2023-03-22

## 2023-03-27 RX ORDER — PREDNISOLONE ACETATE 10 MG/ML
1 SUSPENSION/ DROPS OPHTHALMIC 2 TIMES DAILY
COMMUNITY
Start: 2023-03-24

## 2023-03-27 NOTE — PROGRESS NOTES
HISTORY OF PRESENT ILLNESS  Richa Leon is a 68 y.o. female. HPI  Pre-op exam for bilateral cataract surgery. BP follow up:  Has hypertension and bicuspid aortic valve followed by cardiology. Has follow up with cardiology for this month. Also has sleep apneas and uses her CPAP nightly. Compliant w/ medication, low salt diet, and exercise. BP home monitoring 130s/70s. No swelling, headache or dizziness. No chest pain, SOB, palpitations    HM:  Mammo: 1/30/23  Colonoscopy: 5/2018 Dr. Craig Villatoro. Repeat in 5 years. Has her appt  schedule. Patient Active Problem List   Diagnosis Code    Osteoporosis M81.0    Bicuspid aortic valve Q23.1    H/O: hypothyroidism Z86.39    Essential hypertension I10    Encounter for medication monitoring Z51.81       Current Outpatient Medications   Medication Sig Dispense Refill    cetirizine (ZYRTEC) 10 mg tablet TAKE 1 TABLET DAILY 90 Tablet 3    fluticasone propionate (FLONASE) 50 mcg/actuation nasal spray USE 2 SPRAYS IN EACH NOSTRIL DAILY AS NEEDED FOR RHINITIS 48 g 3    raloxifene (EVISTA) 60 mg tablet Take 1 Tablet by mouth daily. 90 Tablet 3    desoximetasone (TOPICORT) 0.25 % topical cream Apply  to affected area two (2) times daily as needed for Skin Irritation. 60 g 1    ibuprofen (MOTRIN) 800 mg tablet Take 800 mg by mouth every six (6) hours as needed. multivit-min/iron/folic/lutein (CENTRUM SILVER WOMEN PO) Take 1 Tab by mouth daily. ASPIRIN PO Take 81 mg by mouth every evening. coated      lisinopril (PRINIVIL, ZESTRIL) 5 mg tablet Take 5 mg by mouth every evening. BIOTIN PO Take 10,000 mcg by mouth daily. CALCIUM CARBONATE/VITAMIN D3 (CALCIUM + D PO) Take 600 mg by mouth two (2) times a day. metroNIDAZOLE (METROCREAM) 0.75 % topical cream Apply 1 Applicator to affected area two (2) times a day. ofloxacin (OCUFLOX) 0.3 % ophthalmic solution Administer 1 Drop to left eye two (2) times a day.       prednisoLONE acetate (PRED FORTE) 1 % ophthalmic suspension Administer 1 Drop to left eye two (2) times a day. Allergies   Allergen Reactions    Penicillin G Hives         Past Medical History:   Diagnosis Date    Bicuspid aortic valve     leaky valve    Fibroid uterus     Hypertension     Obstructive sleep apnea on CPAP 2017    Osteoporosis     osteopenia now per pt per last 2 bone densities    Thyroid disease 1985    hx of synthroid use/thyroid bloodwork normal now         Past Surgical History:   Procedure Laterality Date    COLONOSCOPY N/A 05/07/2018    COLONOSCOPY performed by Lelia Ratliff. Vicky Lees MD at [facility] ENDOSCOPY    ENDOSCOPY, COLON, DIAGNOSTIC  2015    HX GYN      left ovarian cyst removed    HX GYN  12/2018, 8/2020    D&C    HX KNEE ARTHROSCOPY Bilateral     torn meniscus    HX ORTHOPAEDIC      cyst left hand    HX ORTHOPAEDIC      bakers cyst    HX ORTHOPAEDIC  10/28/2020    left knee- mensicus repair    HX TONSILLECTOMY           Family History   Problem Relation Age of Onset    Heart Disease Mother     Cancer Mother 80        colorectal cancer    Cancer Father         neck    No Known Problems Sister     No Known Problems Sister     Cancer Maternal Grandmother         ?colon cancer    Cancer Paternal Grandmother         ?colon cancer    Cancer Other         breast - cousins    Heart Disease Other         cousins       Social History     Tobacco Use    Smoking status: Never    Smokeless tobacco: Never   Substance Use Topics    Alcohol use:  Yes     Alcohol/week: 0.0 standard drinks     Comment: occasionally - very little        Lab Results   Component Value Date/Time    WBC 6.4 11/04/2022 11:38 AM    HGB 12.9 11/04/2022 11:38 AM    HCT 40.3 11/04/2022 11:38 AM    PLATELET 580 03/65/3738 11:38 AM    MCV 96.4 11/04/2022 11:38 AM     Lab Results   Component Value Date/Time    Cholesterol, total 200 (H) 11/04/2022 11:38 AM    HDL Cholesterol 53 11/04/2022 11:38 AM    LDL, calculated 119.2 (H) 11/04/2022 11:38 AM    Triglyceride 139 11/04/2022 11:38 AM    CHOL/HDL Ratio 3.8 11/04/2022 11:38 AM     Lab Results   Component Value Date/Time    TSH 1.890 02/28/2018 10:21 AM    T4, Free 1.32 02/22/2017 11:53 AM      Lab Results   Component Value Date/Time    Sodium 140 11/04/2022 11:38 AM    Potassium 4.6 11/04/2022 11:38 AM    Chloride 105 11/04/2022 11:38 AM    CO2 30 11/04/2022 11:38 AM    Anion gap 5 11/04/2022 11:38 AM    Glucose 91 11/04/2022 11:38 AM    BUN 14 11/04/2022 11:38 AM    Creatinine 0.85 11/04/2022 11:38 AM    BUN/Creatinine ratio 16 11/04/2022 11:38 AM    GFR est AA >60 11/02/2021 10:24 AM    GFR est non-AA >60 11/02/2021 10:24 AM    Calcium 10.2 (H) 11/04/2022 11:38 AM    Bilirubin, total 0.3 11/04/2022 11:38 AM    ALT (SGPT) 34 11/04/2022 11:38 AM    Alk. phosphatase 52 11/04/2022 11:38 AM    Protein, total 7.3 11/04/2022 11:38 AM    Albumin 4.0 11/04/2022 11:38 AM    Globulin 3.3 11/04/2022 11:38 AM    A-G Ratio 1.2 11/04/2022 11:38 AM      Lab Results   Component Value Date/Time    Hemoglobin A1c 5.8 (H) 01/07/2014 11:52 AM    Hemoglobin A1c (POC) 5.3 01/26/2016 10:30 AM         Review of Systems   Constitutional:  Negative for malaise/fatigue. HENT:  Negative for congestion. Eyes:  Negative for blurred vision. Respiratory:  Negative for cough and shortness of breath. Cardiovascular:  Negative for chest pain, palpitations and leg swelling. Gastrointestinal:  Negative for abdominal pain, constipation and heartburn. Genitourinary:  Negative for dysuria, frequency and urgency. Musculoskeletal:  Negative for back pain and joint pain. Neurological:  Negative for dizziness, tingling and headaches. Endo/Heme/Allergies:  Positive for environmental allergies. Psychiatric/Behavioral:  Negative for depression. The patient does not have insomnia. Physical Exam  Vitals and nursing note reviewed. Constitutional:       Appearance: Normal appearance. She is well-developed.       Comments: /73   Pulse 65 Temp 97.6 °F (36.4 °C)   Resp 12   Ht 5' 1\" (1.549 m)   Wt 148 lb 6.4 oz (67.3 kg)   LMP 01/01/2004   SpO2 98%   BMI 28.04 kg/m²    HENT:      Right Ear: Tympanic membrane and ear canal normal.      Left Ear: Tympanic membrane and ear canal normal.      Nose: Rhinorrhea present. Neck:      Thyroid: No thyromegaly. Cardiovascular:      Rate and Rhythm: Normal rate and regular rhythm. Heart sounds: Murmur heard. Systolic murmur is present with a grade of 3/6. Pulmonary:      Effort: Pulmonary effort is normal.      Breath sounds: Normal breath sounds. Abdominal:      General: Bowel sounds are normal.      Palpations: Abdomen is soft. There is no mass. Tenderness: There is no abdominal tenderness. Musculoskeletal:         General: Normal range of motion. Cervical back: Normal range of motion and neck supple. Right lower leg: No edema. Left lower leg: No edema. Lymphadenopathy:      Cervical: No cervical adenopathy. Skin:     General: Skin is warm and dry. Neurological:      General: No focal deficit present. Mental Status: She is alert and oriented to person, place, and time. Psychiatric:         Mood and Affect: Mood normal.       ASSESSMENT and PLAN  Diagnoses and all orders for this visit:    1. Pre-op exam  2. Cortical age-related cataract of both eyes  Medically stable for surgery     3. Essential hypertension  Stable     4. Hypercholesterolemia  Stable. Continue to monitor. Work on diet and exercise. 5. Bicuspid aortic valve  Stable as per cardiology    6. Obstructive sleep apnea on CPAP  Stable     7. Environmental allergies  Continue with allergy meds    8. Encounter for medication monitoring    reviewed diet, exercise and weight control  cardiovascular risk and specific lipid/LDL goals reviewed  reviewed medications and side effects in detail    I have discussed diagnosis listed in this note with pt and/or family.  I have discussed treatment plans and options and the risk/benefit analysis of those options, including safe use of medications and possible medication side effects. Through the use of shared decision making we have agreed to the above plan. The patient has received an after-visit summary and questions were answered concerning future plans and follow up. Advise pt of any urgent changes then to proceed to the ER.

## 2023-03-27 NOTE — PROGRESS NOTES
Patient identified by 2 identifiers. Chief Complaint   Patient presents with    Pre-op Exam     1. Have you been to the ER, urgent care clinic since your last visit? Hospitalized since your last visit? Yes Where: [facility] [place]    2. Have you seen or consulted any other health care providers outside of the [address] [place] [address] since your last visit? Include any pap smears or colon screening. No    Pre op clearance and office note faxed for upcoming eye surgery.

## 2023-04-19 RX ORDER — RALOXIFENE HYDROCHLORIDE 60 MG/1
TABLET, FILM COATED ORAL
Qty: 90 TABLET | Refills: 3 | Status: SHIPPED | OUTPATIENT
Start: 2023-04-19

## 2023-05-12 ENCOUNTER — ANESTHESIA (OUTPATIENT)
Facility: HOSPITAL | Age: 73
End: 2023-05-12
Payer: MEDICARE

## 2023-05-12 ENCOUNTER — ANESTHESIA EVENT (OUTPATIENT)
Facility: HOSPITAL | Age: 73
End: 2023-05-12
Payer: MEDICARE

## 2023-05-12 ENCOUNTER — HOSPITAL ENCOUNTER (OUTPATIENT)
Facility: HOSPITAL | Age: 73
Setting detail: OUTPATIENT SURGERY
Discharge: HOME | End: 2023-05-12
Attending: INTERNAL MEDICINE | Admitting: INTERNAL MEDICINE
Payer: MEDICARE

## 2023-05-12 VITALS
BODY MASS INDEX: 27.24 KG/M2 | HEIGHT: 61 IN | DIASTOLIC BLOOD PRESSURE: 54 MMHG | RESPIRATION RATE: 15 BRPM | HEART RATE: 66 BPM | TEMPERATURE: 98.2 F | OXYGEN SATURATION: 97 % | SYSTOLIC BLOOD PRESSURE: 107 MMHG | WEIGHT: 144.3 LBS

## 2023-05-12 PROCEDURE — 3600007502: Performed by: INTERNAL MEDICINE

## 2023-05-12 PROCEDURE — 6360000002 HC RX W HCPCS: Performed by: NURSE ANESTHETIST, CERTIFIED REGISTERED

## 2023-05-12 PROCEDURE — 3700000001 HC ADD 15 MINUTES (ANESTHESIA): Performed by: INTERNAL MEDICINE

## 2023-05-12 PROCEDURE — 3700000000 HC ANESTHESIA ATTENDED CARE: Performed by: INTERNAL MEDICINE

## 2023-05-12 PROCEDURE — 7100000010 HC PHASE II RECOVERY - FIRST 15 MIN: Performed by: INTERNAL MEDICINE

## 2023-05-12 PROCEDURE — 3600007512: Performed by: INTERNAL MEDICINE

## 2023-05-12 PROCEDURE — 2580000003 HC RX 258: Performed by: INTERNAL MEDICINE

## 2023-05-12 PROCEDURE — 2500000003 HC RX 250 WO HCPCS: Performed by: NURSE ANESTHETIST, CERTIFIED REGISTERED

## 2023-05-12 RX ORDER — SODIUM CHLORIDE 9 MG/ML
25 INJECTION, SOLUTION INTRAVENOUS PRN
Status: DISCONTINUED | OUTPATIENT
Start: 2023-05-12 | End: 2023-05-12 | Stop reason: HOSPADM

## 2023-05-12 RX ORDER — SODIUM CHLORIDE 0.9 % (FLUSH) 0.9 %
5-40 SYRINGE (ML) INJECTION PRN
Status: DISCONTINUED | OUTPATIENT
Start: 2023-05-12 | End: 2023-05-12 | Stop reason: HOSPADM

## 2023-05-12 RX ORDER — LIDOCAINE HYDROCHLORIDE 20 MG/ML
INJECTION, SOLUTION EPIDURAL; INFILTRATION; INTRACAUDAL; PERINEURAL PRN
Status: DISCONTINUED | OUTPATIENT
Start: 2023-05-12 | End: 2023-05-12 | Stop reason: SDUPTHER

## 2023-05-12 RX ORDER — SODIUM CHLORIDE 9 MG/ML
INJECTION, SOLUTION INTRAVENOUS CONTINUOUS
Status: DISCONTINUED | OUTPATIENT
Start: 2023-05-12 | End: 2023-05-12 | Stop reason: HOSPADM

## 2023-05-12 RX ORDER — SODIUM CHLORIDE 0.9 % (FLUSH) 0.9 %
5-40 SYRINGE (ML) INJECTION EVERY 12 HOURS SCHEDULED
Status: DISCONTINUED | OUTPATIENT
Start: 2023-05-12 | End: 2023-05-12 | Stop reason: HOSPADM

## 2023-05-12 RX ADMIN — LIDOCAINE HYDROCHLORIDE 50 MG: 20 INJECTION, SOLUTION EPIDURAL; INFILTRATION; INTRACAUDAL; PERINEURAL at 10:42

## 2023-05-12 RX ADMIN — PROPOFOL 100 MG: 10 INJECTION, EMULSION INTRAVENOUS at 10:57

## 2023-05-12 RX ADMIN — PROPOFOL 100 MG: 10 INJECTION, EMULSION INTRAVENOUS at 10:42

## 2023-05-12 RX ADMIN — PROPOFOL 100 MG: 10 INJECTION, EMULSION INTRAVENOUS at 10:49

## 2023-05-12 RX ADMIN — SODIUM CHLORIDE: 9 INJECTION, SOLUTION INTRAVENOUS at 10:34

## 2023-05-12 NOTE — ANESTHESIA POSTPROCEDURE EVALUATION
Department of Anesthesiology  Postprocedure Note    Patient: Hong Melendrez  MRN: [id number]  YOB: 1950  Date of evaluation: 5/12/2023      Procedure Summary     Date: 05/12/23 Room / Location: [facility] ENDO  / [facility] ENDOSCOPY    Anesthesia Start: 1042 Anesthesia Stop: 1103    Procedure: COLONOSCOPY (Lower GI Region) Diagnosis:       Family history of malignant neoplasm of gastrointestinal tract      (Family history of malignant neoplasm of gastrointestinal tract [Z80.0])    Surgeons: Daniel Bhakta MD Responsible Provider: Adrien Blake MD    Anesthesia Type: MAC ASA Status: 3          Anesthesia Type: No value filed.     Rik Phase I: Rik Score: 10    Rik Phase II: Rik Score: 10      Anesthesia Post Evaluation    Patient location during evaluation: PACU  Level of consciousness: awake  Airway patency: patent  Nausea & Vomiting: no nausea  Complications: no  Cardiovascular status: blood pressure returned to baseline  Respiratory status: acceptable  Hydration status: euvolemic

## 2023-05-12 NOTE — PROGRESS NOTES

## 2023-05-12 NOTE — ANESTHESIA PRE PROCEDURE
hypothyroidism Z86.39    Osteoporosis M81.0    Encounter for medication monitoring Z51.81       Past Medical History:        Diagnosis Date    Bicuspid aortic valve     leaky valve    Fibroid uterus     Hypertension     Obstructive sleep apnea on CPAP 2017    Osteoporosis     osteopenia now per pt per last 2 bone densities    Thyroid disease 1985    hx of synthroid use/thyroid bloodwork normal now       Past Surgical History:        Procedure Laterality Date    COLONOSCOPY N/A 05/07/2018    COLONOSCOPY performed by Kathy Starkey. Enrique Sampson MD at [address] [place] [address] ENDOSCOPY    COLONOSCOPY  2015    GYN      left ovarian cyst removed    GYN  12/2018, 8/2020    D&C    KNEE ARTHROSCOPY Bilateral     torn meniscus    ORTHOPEDIC SURGERY      cyst left hand    ORTHOPEDIC SURGERY      bakers cyst    ORTHOPEDIC SURGERY Left 10/28/2020    left knee- mensicus repair    TONSILLECTOMY         Social History:    Social History     Tobacco Use    Smoking status: Never    Smokeless tobacco: Never   Substance Use Topics    Alcohol use: Yes     Alcohol/week: 0.0 standard drinks                                Counseling given: Not Answered      Vital Signs (Current): There were no vitals filed for this visit.                                            BP Readings from Last 3 Encounters:   03/27/23 131/73   11/04/22 131/68   01/19/22 (!) 143/73       NPO Status: Time of last liquid consumption: 1800                        Time of last solid consumption: 0600                        Date of last liquid consumption: 05/10/23                        Date of last solid food consumption: 05/12/23    BMI:   Wt Readings from Last 3 Encounters:   03/27/23 67.3 kg (148 lb 6.4 oz)   11/04/22 66.4 kg (146 lb 6.4 oz)   08/29/22 68 kg (150 lb)     There is no height or weight on file to calculate BMI.    CBC:   Lab Results   Component Value Date/Time    WBC 6.4 11/04/2022 11:38 AM    RBC 4.18 11/04/2022 11:38 AM    HGB 12.9 11/04/2022 11:38 AM    HCT

## 2023-05-12 NOTE — OP NOTE
[address], [address] [place] [address]        Colonoscopy Operative Report    Kieran Clarke  [id number]  1950      Procedure Type:   Colonoscopy --screening     Indications:    Personal history of colon polyps (screening only)         Pre-operative Diagnosis: see indication above    Post-operative Diagnosis:  See findings below    :  Marta Cody MD    Staff: Circulator: Donzell Cranker, RN  Endoscopy Technician: Jordy Robins     Referring Provider: Laurie Hale MD      Sedation:  MAC      Procedure Details:  After informed consent was obtained with all risks and benefits of procedure explained and preoperative exam completed, the patient was taken to the endoscopy suite and placed in the left lateral decubitus position. Upon sequential sedation as per above, a digital rectal exam was performed demonstrating internal hemorrhoids. The Olympus pediatric videocolonoscope  was inserted in the rectum and carefully advanced to the terminal ileum. The cecum was identified by the ileocecal valve and appendiceal orifice. The quality of preparation was good. The colonoscope was slowly withdrawn with careful evaluation between folds. Retroflexion in the rectum was completed . Findings:   Mild to moderate left-sided diverticulosis. Specimen Removed:  none    Complications: None. EBL:  None.     Impression:     As above    Recommendations:  Repeat colonoscopy in 5 years  High fiber diet education    Signed By: Marta Cody MD     5/12/2023  11:06 AM

## 2023-05-12 NOTE — H&P
[address]        History and Physical       NAME:  Reji Álvarez   :   1950   MRN:   [id number]             History of Present Illness:  Patient is a 68 y.o. who is seen for history of colon polyps and family history of colon cancer. Last colonoscopy was in 2018 and no polyps were removed. PMH:  Past Medical History:   Diagnosis Date    Bicuspid aortic valve     leaky valve    Fibroid uterus     Hypertension     Obstructive sleep apnea on CPAP     Osteoporosis     osteopenia now per pt per last 2 bone densities    Thyroid disease 1985    hx of synthroid use/thyroid bloodwork normal now       PSH:  Past Surgical History:   Procedure Laterality Date    COLONOSCOPY N/A 2018    COLONOSCOPY performed by Ailyn Kelly. Bill Valdes MD at [facility] ENDOSCOPY    COLONOSCOPY      GYN      left ovarian cyst removed    GYN  2018, 2020    D&C    KNEE ARTHROSCOPY Bilateral     torn meniscus    ORTHOPEDIC SURGERY      cyst left hand    ORTHOPEDIC SURGERY      bakers cyst    ORTHOPEDIC SURGERY Left 10/28/2020    left knee- mensicus repair    TONSILLECTOMY         Allergies: Allergies   Allergen Reactions    Penicillin G Hives       Home Medications:  Prior to Admission Medications   Prescriptions Last Dose Informant Patient Reported? Taking?    ASPIRIN PO Past Week  Yes No   Sig: Take by mouth every evening   BIOTIN PO 2023  Yes No   Sig: Take by mouth daily   cetirizine (ZYRTEC) 10 MG tablet 2023  Yes No   Sig: TAKE 1 TABLET DAILY   desoximetasone (TOPICORT) 0.25 % cream Past Month  Yes No   Sig: Apply topically 2 times daily as needed   fluticasone (FLONASE) 50 MCG/ACT nasal spray Past Week  Yes No   Sig: USE 2 SPRAYS IN EACH NOSTRIL DAILY AS NEEDED FOR RHINITIS   ibuprofen (ADVIL;MOTRIN) 800 MG tablet Past Week  Yes No   Sig: Take by mouth every 6 hours as needed   lisinopril (PRINIVIL;ZESTRIL) 5 MG tablet 5/10/2023  Yes No   Sig: Take by

## 2023-05-12 NOTE — DISCHARGE INSTRUCTIONS
[address]  [id number]  1950    Discomfort:  Redness at IV site- apply warm compress to area; if redness or soreness persist- contact your physician  There may be a slight amount of blood passed from the rectum  Gaseous discomfort- walking, belching will help relieve any discomfort  You may not operate a vehicle for 12 hours  You may not engage in an occupation involving machinery or appliances for rest of today  You may not drink alcoholic beverages for at least 12 hours  Avoid making any critical decisions for at least 24 hour  DIET:  You may resume your regular diet - however -  remember your colon is empty and a heavy meal will produce gas. Avoid these foods:  vegetables, fried / greasy foods, carbonated drinks     ACTIVITY:  You may  resume your normal daily activities it is recommended that you spend the remainder of the day resting -  avoid any strenuous activity. CALL M.D. ANY SIGN OF:   Increasing pain, nausea, vomiting  Abdominal distension (swelling)  New increased bleeding (oral or rectal)  Fever (chills)  Pain in chest area  Bloody discharge from nose or mouth  Shortness of breath      Follow-up Instructions:   Call Dr. Alicia Granados for any questions or problems at [address] [place] [address] St:   Your colonoscopy showed no polyps. Please maintain a high fiber diet. Your next colonoscopy will be due in 5 years. Telephone # [phone]      Signed By: Alicia Granados MD     5/12/2023  11:05 AM       DISCHARGE SUMMARY from Nurse    The following personal items collected during your admission are returned to you:   Dental Appliance:    Vision:    Hearing Aid:    Jewelry:    Clothing:    Other Valuables:    Valuables sent to safe:      Learning About Coronavirus (COVID-19)  Coronavirus (COVID-19): Overview  What is coronavirus (RJLVI-04)?   The coronavirus disease (COVID-19)

## 2023-07-14 ENCOUNTER — TELEPHONE (OUTPATIENT)
Age: 73
End: 2023-07-14

## 2023-07-14 NOTE — TELEPHONE ENCOUNTER
Phoned the patient to reschedule her 8/21/23 vv. She stated that she was out of town at a ball game and will call back Monday to reschedule.

## 2023-09-08 RX ORDER — FLUTICASONE PROPIONATE 50 MCG
SPRAY, SUSPENSION (ML) NASAL
Qty: 48 G | Refills: 3 | Status: SHIPPED | OUTPATIENT
Start: 2023-09-08

## 2023-09-08 NOTE — TELEPHONE ENCOUNTER
Last appointment: 3/27/23  Next appointment: 11/6/23  Previous refill encounter(s): 7/26/22 #3 with 3 refills    Requested Prescriptions     Pending Prescriptions Disp Refills    fluticasone (FLONASE) 50 MCG/ACT nasal spray [Pharmacy Med Name: FLUTICASONE PROP NASAL SPRAY 16GM 50MCG] 48 g 3     Sig: USE 2 SPRAYS IN EACH NOSTRIL DAILY AS NEEDED FOR RHINITIS         For Pharmacy Admin Tracking Only    Program: Medication Refill  CPA in place:    Recommendation Provided To:    Intervention Detail: New Rx: 1, reason: Patient Preference  Intervention Accepted By:   Mario Arnett Closed?:    Time Spent (min): 5

## 2023-09-12 ENCOUNTER — TELEPHONE (OUTPATIENT)
Age: 73
End: 2023-09-12

## 2023-09-12 RX ORDER — LISINOPRIL 5 MG/1
5 TABLET ORAL EVERY EVENING
Qty: 10 TABLET | Refills: 0 | Status: SHIPPED | OUTPATIENT
Start: 2023-09-12

## 2023-09-12 NOTE — TELEPHONE ENCOUNTER
Pt requesting 10 Lisinopril tablets until her mail order comes on Saturday.   Rx sent to  for approval.

## 2023-09-12 NOTE — TELEPHONE ENCOUNTER
----- Message from Nicko Maldonado sent at 3/70/1715 11:09 AM EDT -----  Subject: Medication Problem    Medication: lisinopril (PRINIVIL;ZESTRIL) 5 MG tablet  Dosage:  Take by mouth every evening  Ordering Provider: Carol Sullivan    Question/Problem: Pt is completely out, requesting enough to get through   next week      Pharmacy: [facility], [address] [place] [address] [phone] Christi Resendiz [phone]    ---------------------------------------------------------------------------  --------------  [address] [place] [address] Mary  [phone]; OK to leave message on voicemail  ---------------------------------------------------------------------------  --------------    SCRIPT ANSWERS  Relationship to Patient: Self

## 2023-09-12 NOTE — TELEPHONE ENCOUNTER
----- Message from Antonietta Perez sent at 3/41/6665 11:09 AM EDT -----  Subject: Medication Problem    Medication: lisinopril (PRINIVIL;ZESTRIL) 5 MG tablet  Dosage:  Take by mouth every evening  Ordering Provider: Keysha Piña    Question/Problem: Pt is completely out, requesting enough to get through   next week      Pharmacy: [facility], [address] [place] [address] [phone] Claudette Irby [phone]    ---------------------------------------------------------------------------  --------------  [address] [place] [address] Mary  [phone]; OK to leave message on voicemail  ---------------------------------------------------------------------------  --------------    SCRIPT ANSWERS  Relationship to Patient: Self

## 2023-10-17 ENCOUNTER — OFFICE VISIT (OUTPATIENT)
Age: 73
End: 2023-10-17
Payer: MEDICARE

## 2023-10-17 ENCOUNTER — CLINICAL DOCUMENTATION (OUTPATIENT)
Age: 73
End: 2023-10-17

## 2023-10-17 VITALS
OXYGEN SATURATION: 98 % | SYSTOLIC BLOOD PRESSURE: 136 MMHG | WEIGHT: 143 LBS | DIASTOLIC BLOOD PRESSURE: 75 MMHG | BODY MASS INDEX: 27 KG/M2 | HEART RATE: 67 BPM | HEIGHT: 61 IN

## 2023-10-17 DIAGNOSIS — I10 PRIMARY HYPERTENSION: ICD-10-CM

## 2023-10-17 DIAGNOSIS — G47.33 OSA (OBSTRUCTIVE SLEEP APNEA): Primary | ICD-10-CM

## 2023-10-17 PROCEDURE — 99213 OFFICE O/P EST LOW 20 MIN: CPT | Performed by: INTERNAL MEDICINE

## 2023-10-17 PROCEDURE — G8419 CALC BMI OUT NRM PARAM NOF/U: HCPCS | Performed by: INTERNAL MEDICINE

## 2023-10-17 PROCEDURE — 3078F DIAST BP <80 MM HG: CPT | Performed by: INTERNAL MEDICINE

## 2023-10-17 PROCEDURE — 1123F ACP DISCUSS/DSCN MKR DOCD: CPT | Performed by: INTERNAL MEDICINE

## 2023-10-17 PROCEDURE — G8484 FLU IMMUNIZE NO ADMIN: HCPCS | Performed by: INTERNAL MEDICINE

## 2023-10-17 PROCEDURE — G9899 SCRN MAM PERF RSLTS DOC: HCPCS | Performed by: INTERNAL MEDICINE

## 2023-10-17 PROCEDURE — 1036F TOBACCO NON-USER: CPT | Performed by: INTERNAL MEDICINE

## 2023-10-17 PROCEDURE — 1090F PRES/ABSN URINE INCON ASSESS: CPT | Performed by: INTERNAL MEDICINE

## 2023-10-17 PROCEDURE — 3017F COLORECTAL CA SCREEN DOC REV: CPT | Performed by: INTERNAL MEDICINE

## 2023-10-17 PROCEDURE — G8399 PT W/DXA RESULTS DOCUMENT: HCPCS | Performed by: INTERNAL MEDICINE

## 2023-10-17 PROCEDURE — 3075F SYST BP GE 130 - 139MM HG: CPT | Performed by: INTERNAL MEDICINE

## 2023-10-17 PROCEDURE — G8427 DOCREV CUR MEDS BY ELIG CLIN: HCPCS | Performed by: INTERNAL MEDICINE

## 2023-10-17 ASSESSMENT — SLEEP AND FATIGUE QUESTIONNAIRES
HOW LIKELY ARE YOU TO NOD OFF OR FALL ASLEEP WHILE LYING DOWN TO REST IN THE AFTERNOON WHEN CIRCUMSTANCES PERMIT: 0
HOW LIKELY ARE YOU TO NOD OFF OR FALL ASLEEP WHILE SITTING AND TALKING TO SOMEONE: 0
HOW LIKELY ARE YOU TO NOD OFF OR FALL ASLEEP WHILE SITTING INACTIVE IN A PUBLIC PLACE: 0
ESS TOTAL SCORE: 5
HOW LIKELY ARE YOU TO NOD OFF OR FALL ASLEEP IN A CAR, WHILE STOPPED FOR A FEW MINUTES IN TRAFFIC: 0
HOW LIKELY ARE YOU TO NOD OFF OR FALL ASLEEP WHILE WATCHING TV: 1
HOW LIKELY ARE YOU TO NOD OFF OR FALL ASLEEP WHILE SITTING AND READING: 2
HOW LIKELY ARE YOU TO NOD OFF OR FALL ASLEEP WHILE SITTING QUIETLY AFTER LUNCH WITHOUT ALCOHOL: 0
HOW LIKELY ARE YOU TO NOD OFF OR FALL ASLEEP WHEN YOU ARE A PASSENGER IN A CAR FOR AN HOUR WITHOUT A BREAK: 2

## 2023-10-17 NOTE — PATIENT INSTRUCTIONS
[facility].Kathy, [address]  Tel.  [phone]  Fax. 8779 [facility], [facility] Aretha Terry  Tel.  [phone]  Fax. [phone] [address]  Tel.  [phone]  Fax. [phone]     Learning About CPAP for Sleep Apnea  What is CPAP? CPAP is a small machine that you use at home every night while you sleep. It increases air pressure in your throat to keep your airway open. When you have sleep apnea, this can help you sleep better so you feel much better. CPAP stands for \"continuous positive airway pressure. \"  The CPAP machine will have one of the following:  A mask that covers your nose and mouth  Prongs that fit into your nose  A mask that covers your nose only, the most common type. This type is called NCPAP. The N stands for \"nasal.\"  Why is it done? CPAP is usually the best treatment for obstructive sleep apnea. It is the first treatment choice and the most widely used. Your doctor may suggest CPAP if you have: Moderate to severe sleep apnea. Sleep apnea and coronary artery disease (CAD) or heart failure. How does it help? CPAP can help you have more normal sleep, so you feel less sleepy and more alert during the daytime. CPAP may help keep heart failure or other heart problems from getting worse. NCPAP may help lower your blood pressure. If you use CPAP, your bed partner may also sleep better because you are not snoring or restless. What are the side effects? Some people who use CPAP have:  A dry or stuffy nose and a sore throat. Irritated skin on the face. Sore eyes. Bloating. If you have any of these problems, work with your doctor to fix them. Here are some things you can try:  Be sure the mask or nasal prongs fit well. See if your doctor can adjust the pressure of your CPAP. If your nose is dry, try a humidifier.   If your nose is runny or stuffy, try decongestant medicine or a steroid

## 2023-10-17 NOTE — PROGRESS NOTES
[address] [facility], [address]  Tel.  [phone]    Fax. 0034 [facility], [address] [place], [place] [address]alice Rincon  Tel.  [phone]    Fax. [phone]     [facility], [address] [facility]  Tel.  [phone]    Fax. Barry Cabral (: 1950) is a 68 y.o. female, established patient, seen for positive airway pressure follow-up and evaluation of the following chief complaint(s):   Sleep Apnea (Yearly PAP follow up)       Giana Nicole was last seen by Ms. Faizan Bueno on 08-15-22, prior notes reviewed in detail. Home sleep test 2017 showed AHI of 15.9/hr with a lowest SaO2 of 88%, duration of SaO2 < 88% 0.0 min. She is seen today for follow up. ASSESSMENT/PLAN:   Diagnosis Orders   1. JULIO CESAR (obstructive sleep apnea)  DME Order for [facility]) as OP      2. Primary hypertension        3. BMI 27.0-27.9,adult            On Respironics :  DreamStation 2 Auto CPAP Advanced. Set up date  2017. Settings:  CPAP: 9 cmH2O      Sleep Apnea -   * She is adherent with PAP therapy and PAP continues to benefit patient and remains necessary for control of her sleep apnea. Continue on current pressures    * Supplies for his device were ordered as noted below:    Orders Placed This Encounter   Procedures    DME Order for (Specify) as OP     Diagnosis: (G47.33) JULIO CESAR (obstructive sleep apnea)  (primary encounter diagnosis)     Replacement Supplies for Positive Airway Pressure Therapy Device:   Duration of need: 99 months.  Nasal Cushion (Replace) 2 per month.  Nasal Interface Mask 1 every 3 months.  Headgear 1 every 6 months.  Tubing with heating element 1 every 3 months.  Filter(s) Disposable 2 per month.  Filter(s) Non-Disposable 1 every 6 months. .   [address] for Humidifier (Replace) 1 every 6 months. Deedee Estrada MD, FAA; NPI: [id number]    Electronically signed.  Date:- 05/15/23

## 2023-11-06 ENCOUNTER — OFFICE VISIT (OUTPATIENT)
Age: 73
End: 2023-11-06
Payer: MEDICARE

## 2023-11-06 VITALS
RESPIRATION RATE: 20 BRPM | SYSTOLIC BLOOD PRESSURE: 142 MMHG | HEART RATE: 61 BPM | WEIGHT: 141 LBS | TEMPERATURE: 97.9 F | HEIGHT: 61 IN | OXYGEN SATURATION: 98 % | BODY MASS INDEX: 26.62 KG/M2 | DIASTOLIC BLOOD PRESSURE: 78 MMHG

## 2023-11-06 DIAGNOSIS — G47.33 OSA ON CPAP: ICD-10-CM

## 2023-11-06 DIAGNOSIS — Z00.00 MEDICARE ANNUAL WELLNESS VISIT, SUBSEQUENT: Primary | ICD-10-CM

## 2023-11-06 DIAGNOSIS — Q23.1 BICUSPID AORTIC VALVE: ICD-10-CM

## 2023-11-06 DIAGNOSIS — I10 ESSENTIAL (PRIMARY) HYPERTENSION: ICD-10-CM

## 2023-11-06 DIAGNOSIS — Z79.899 ENCOUNTER FOR LONG-TERM (CURRENT) USE OF MEDICATIONS: ICD-10-CM

## 2023-11-06 DIAGNOSIS — E78.00 HYPERCHOLESTEROLEMIA: ICD-10-CM

## 2023-11-06 LAB
ALBUMIN SERPL-MCNC: 4.1 G/DL (ref 3.5–5)
ALBUMIN/GLOB SERPL: 1.4 (ref 1.1–2.2)
ALP SERPL-CCNC: 53 U/L (ref 45–117)
ALT SERPL-CCNC: 23 U/L (ref 12–78)
ANION GAP SERPL CALC-SCNC: 4 MMOL/L (ref 5–15)
APPEARANCE UR: CLEAR
AST SERPL-CCNC: 16 U/L (ref 15–37)
BACTERIA URNS QL MICRO: NEGATIVE /HPF
BILIRUB SERPL-MCNC: 0.4 MG/DL (ref 0.2–1)
BILIRUB UR QL: NEGATIVE
BUN SERPL-MCNC: 15 MG/DL (ref 6–20)
BUN/CREAT SERPL: 17 (ref 12–20)
CALCIUM SERPL-MCNC: 10.1 MG/DL (ref 8.5–10.1)
CHLORIDE SERPL-SCNC: 108 MMOL/L (ref 97–108)
CHOLEST SERPL-MCNC: 217 MG/DL
CO2 SERPL-SCNC: 28 MMOL/L (ref 21–32)
COLOR UR: NORMAL
CREAT SERPL-MCNC: 0.88 MG/DL (ref 0.55–1.02)
EPITH CASTS URNS QL MICRO: NORMAL /LPF
ERYTHROCYTE [DISTWIDTH] IN BLOOD BY AUTOMATED COUNT: 13.2 % (ref 11.5–14.5)
GLOBULIN SER CALC-MCNC: 3 G/DL (ref 2–4)
GLUCOSE SERPL-MCNC: 93 MG/DL (ref 65–100)
GLUCOSE UR STRIP.AUTO-MCNC: NEGATIVE MG/DL
HCT VFR BLD AUTO: 38.4 % (ref 35–47)
HDLC SERPL-MCNC: 85 MG/DL
HDLC SERPL: 2.6 (ref 0–5)
HGB BLD-MCNC: 12.6 G/DL (ref 11.5–16)
HGB UR QL STRIP: NEGATIVE
HYALINE CASTS URNS QL MICRO: NORMAL /LPF (ref 0–5)
KETONES UR QL STRIP.AUTO: NEGATIVE MG/DL
LDLC SERPL CALC-MCNC: 118.6 MG/DL (ref 0–100)
LEUKOCYTE ESTERASE UR QL STRIP.AUTO: NEGATIVE
MCH RBC QN AUTO: 30.2 PG (ref 26–34)
MCHC RBC AUTO-ENTMCNC: 32.8 G/DL (ref 30–36.5)
MCV RBC AUTO: 92.1 FL (ref 80–99)
NITRITE UR QL STRIP.AUTO: NEGATIVE
NRBC # BLD: 0 K/UL (ref 0–0.01)
NRBC BLD-RTO: 0 PER 100 WBC
PH UR STRIP: 6.5 (ref 5–8)
PLATELET # BLD AUTO: 214 K/UL (ref 150–400)
PMV BLD AUTO: 10.3 FL (ref 8.9–12.9)
POTASSIUM SERPL-SCNC: 4.5 MMOL/L (ref 3.5–5.1)
PROT SERPL-MCNC: 7.1 G/DL (ref 6.4–8.2)
PROT UR STRIP-MCNC: NEGATIVE MG/DL
RBC # BLD AUTO: 4.17 M/UL (ref 3.8–5.2)
RBC #/AREA URNS HPF: NORMAL /HPF (ref 0–5)
SODIUM SERPL-SCNC: 140 MMOL/L (ref 136–145)
SP GR UR REFRACTOMETRY: 1 (ref 1–1.03)
TRIGL SERPL-MCNC: 67 MG/DL
TSH SERPL DL<=0.05 MIU/L-ACNC: 1.48 UIU/ML (ref 0.36–3.74)
UROBILINOGEN UR QL STRIP.AUTO: 0.2 EU/DL (ref 0.2–1)
VLDLC SERPL CALC-MCNC: 13.4 MG/DL
WBC # BLD AUTO: 5.8 K/UL (ref 3.6–11)
WBC URNS QL MICRO: NORMAL /HPF (ref 0–4)

## 2023-11-06 PROCEDURE — 3017F COLORECTAL CA SCREEN DOC REV: CPT | Performed by: FAMILY MEDICINE

## 2023-11-06 PROCEDURE — G8399 PT W/DXA RESULTS DOCUMENT: HCPCS | Performed by: FAMILY MEDICINE

## 2023-11-06 PROCEDURE — 3078F DIAST BP <80 MM HG: CPT | Performed by: FAMILY MEDICINE

## 2023-11-06 PROCEDURE — G8419 CALC BMI OUT NRM PARAM NOF/U: HCPCS | Performed by: FAMILY MEDICINE

## 2023-11-06 PROCEDURE — G8427 DOCREV CUR MEDS BY ELIG CLIN: HCPCS | Performed by: FAMILY MEDICINE

## 2023-11-06 PROCEDURE — G0439 PPPS, SUBSEQ VISIT: HCPCS | Performed by: FAMILY MEDICINE

## 2023-11-06 PROCEDURE — 99213 OFFICE O/P EST LOW 20 MIN: CPT | Performed by: FAMILY MEDICINE

## 2023-11-06 PROCEDURE — 1123F ACP DISCUSS/DSCN MKR DOCD: CPT | Performed by: FAMILY MEDICINE

## 2023-11-06 PROCEDURE — 90694 VACC AIIV4 NO PRSRV 0.5ML IM: CPT | Performed by: FAMILY MEDICINE

## 2023-11-06 PROCEDURE — 3077F SYST BP >= 140 MM HG: CPT | Performed by: FAMILY MEDICINE

## 2023-11-06 PROCEDURE — 1090F PRES/ABSN URINE INCON ASSESS: CPT | Performed by: FAMILY MEDICINE

## 2023-11-06 PROCEDURE — G8484 FLU IMMUNIZE NO ADMIN: HCPCS | Performed by: FAMILY MEDICINE

## 2023-11-06 PROCEDURE — PBSHW INFLUENZA, FLUAD, (AGE 65 Y+), IM, PF, 0.5 ML: Performed by: FAMILY MEDICINE

## 2023-11-06 PROCEDURE — G9899 SCRN MAM PERF RSLTS DOC: HCPCS | Performed by: FAMILY MEDICINE

## 2023-11-06 PROCEDURE — 1036F TOBACCO NON-USER: CPT | Performed by: FAMILY MEDICINE

## 2023-11-06 RX ORDER — GINGER ROOT/GINGER ROOT EXT 262.5 MG
CAPSULE ORAL
COMMUNITY

## 2023-11-06 SDOH — ECONOMIC STABILITY: INCOME INSECURITY: HOW HARD IS IT FOR YOU TO PAY FOR THE VERY BASICS LIKE FOOD, HOUSING, MEDICAL CARE, AND HEATING?: NOT HARD AT ALL

## 2023-11-06 SDOH — ECONOMIC STABILITY: FOOD INSECURITY: WITHIN THE PAST 12 MONTHS, THE FOOD YOU BOUGHT JUST DIDN'T LAST AND YOU DIDN'T HAVE MONEY TO GET MORE.: NEVER TRUE

## 2023-11-06 SDOH — ECONOMIC STABILITY: HOUSING INSECURITY
IN THE LAST 12 MONTHS, WAS THERE A TIME WHEN YOU DID NOT HAVE A STEADY PLACE TO SLEEP OR SLEPT IN A SHELTER (INCLUDING NOW)?: NO

## 2023-11-06 SDOH — ECONOMIC STABILITY: FOOD INSECURITY: WITHIN THE PAST 12 MONTHS, YOU WORRIED THAT YOUR FOOD WOULD RUN OUT BEFORE YOU GOT MONEY TO BUY MORE.: NEVER TRUE

## 2023-11-06 ASSESSMENT — ENCOUNTER SYMPTOMS
CONSTIPATION: 0
ABDOMINAL PAIN: 0
BLOOD IN STOOL: 0
SHORTNESS OF BREATH: 0
CHEST TIGHTNESS: 0
COUGH: 0
RHINORRHEA: 0

## 2023-11-06 ASSESSMENT — PATIENT HEALTH QUESTIONNAIRE - PHQ9
2. FEELING DOWN, DEPRESSED OR HOPELESS: 0
SUM OF ALL RESPONSES TO PHQ QUESTIONS 1-9: 0
SUM OF ALL RESPONSES TO PHQ QUESTIONS 1-9: 0
SUM OF ALL RESPONSES TO PHQ9 QUESTIONS 1 & 2: 0
SUM OF ALL RESPONSES TO PHQ QUESTIONS 1-9: 0
1. LITTLE INTEREST OR PLEASURE IN DOING THINGS: 0
SUM OF ALL RESPONSES TO PHQ QUESTIONS 1-9: 0

## 2023-11-06 ASSESSMENT — LIFESTYLE VARIABLES
HOW MANY STANDARD DRINKS CONTAINING ALCOHOL DO YOU HAVE ON A TYPICAL DAY: 1 OR 2
HOW OFTEN DO YOU HAVE A DRINK CONTAINING ALCOHOL: MONTHLY OR LESS

## 2023-11-06 NOTE — PROGRESS NOTES
Subjective:      Patient ID: Marleen Langley is a 68 y.o. female. HPI  Follow up on chronic medical problems. BP follow up:  Has hypertension and bicuspid aortic valve followed by cardiology. Has had follow up with cardiology for this month. Also has sleep apnea and uses her CPAP nightly. Compliant w/ medication, low salt diet, and exercise. BP home monitoring 130s/70s. No swelling, headache or dizziness. No chest pain, SOB, palpitations    HM:  Mammo: 7/31/23  Colonoscopy: 5/2013 Dr. Greer Rodríguez. Repeat in 5 years. Past Medical History:   Diagnosis Date    Bicuspid aortic valve     leaky valve    Fibroid uterus     Hypertension     Obstructive sleep apnea on CPAP 2017    Osteoporosis     osteopenia now per pt per last 2 bone densities    Thyroid disease 1985    hx of synthroid use/thyroid bloodwork normal now     Past Surgical History:   Procedure Laterality Date    COLONOSCOPY N/A 05/07/2018    COLONOSCOPY performed by Nicole Echavarria.  Greer Rodríguez MD at [address] [facility] ENDOSCOPY    COLONOSCOPY  2015    COLONOSCOPY N/A 5/12/2023    COLONOSCOPY performed by Eileen Zuniga MD at [address]      left ovarian cyst removed    GYN  12/2018, 8/2020    D&C    KNEE ARTHROSCOPY Bilateral     torn meniscus    ORTHOPEDIC SURGERY      cyst left hand    ORTHOPEDIC SURGERY      bakers cyst    ORTHOPEDIC SURGERY Left 10/28/2020    left knee- mensicus repair    TONSILLECTOMY       Current Outpatient Medications   Medication Sig Dispense Refill    metroNIDAZOLE (METROCREAM) 0.75 % cream Apply 1 applicator topically 2 times daily      Uwovzc-KkOsr-IdXzlg-FA-Omega (MULTIVITAMIN/MINERALS PO) Take 1 tablet by mouth daily      lisinopril (PRINIVIL;ZESTRIL) 5 MG tablet Take 1 tablet by mouth every evening 10 tablet 0    fluticasone (FLONASE) 50 MCG/ACT nasal spray USE 2 SPRAYS IN EACH NOSTRIL DAILY AS NEEDED FOR RHINITIS 48 g 3    ASPIRIN PO Take 81 mg by mouth every evening      BIOTIN PO Take by mouth daily      cetirizine

## 2023-11-06 NOTE — PATIENT INSTRUCTIONS

## 2023-12-21 PROBLEM — Z78.0 MENOPAUSE: Status: ACTIVE | Noted: 2023-01-24

## 2023-12-21 PROBLEM — M85.80 OSTEOPENIA: Status: ACTIVE | Noted: 2020-01-29

## 2024-01-05 ENCOUNTER — TRANSCRIBE ORDERS (OUTPATIENT)
Facility: HOSPITAL | Age: 74
End: 2024-01-05

## 2024-01-05 DIAGNOSIS — Z12.31 VISIT FOR SCREENING MAMMOGRAM: Primary | ICD-10-CM

## 2024-01-17 ENCOUNTER — HOSPITAL ENCOUNTER (OUTPATIENT)
Facility: HOSPITAL | Age: 74
Discharge: HOME | End: 2024-01-20
Attending: FAMILY MEDICINE
Payer: MEDICARE

## 2024-01-17 VITALS — WEIGHT: 144 LBS | BODY MASS INDEX: 27.19 KG/M2 | HEIGHT: 61 IN

## 2024-01-17 DIAGNOSIS — Z12.31 VISIT FOR SCREENING MAMMOGRAM: ICD-10-CM

## 2024-01-17 PROCEDURE — 77063 BREAST TOMOSYNTHESIS BI: CPT

## 2024-01-30 ENCOUNTER — OFFICE VISIT (OUTPATIENT)
Age: 74
End: 2024-01-30
Payer: MEDICARE

## 2024-01-30 VITALS
DIASTOLIC BLOOD PRESSURE: 78 MMHG | HEIGHT: 61 IN | BODY MASS INDEX: 27.83 KG/M2 | WEIGHT: 147.4 LBS | TEMPERATURE: 97.8 F | SYSTOLIC BLOOD PRESSURE: 159 MMHG | HEART RATE: 66 BPM | RESPIRATION RATE: 16 BRPM | OXYGEN SATURATION: 99 %

## 2024-01-30 DIAGNOSIS — Z79.899 ENCOUNTER FOR LONG-TERM (CURRENT) USE OF MEDICATIONS: ICD-10-CM

## 2024-01-30 DIAGNOSIS — G47.33 OSA ON CPAP: ICD-10-CM

## 2024-01-30 DIAGNOSIS — Q23.1 BICUSPID AORTIC VALVE: ICD-10-CM

## 2024-01-30 DIAGNOSIS — Z01.818 PRE-OP EXAM: ICD-10-CM

## 2024-01-30 DIAGNOSIS — Z01.818 PRE-OP EXAM: Primary | ICD-10-CM

## 2024-01-30 DIAGNOSIS — Z13.1 SCREENING FOR DIABETES MELLITUS: ICD-10-CM

## 2024-01-30 DIAGNOSIS — M17.11 PRIMARY OSTEOARTHRITIS OF RIGHT KNEE: ICD-10-CM

## 2024-01-30 DIAGNOSIS — I10 ESSENTIAL (PRIMARY) HYPERTENSION: ICD-10-CM

## 2024-01-30 DIAGNOSIS — E78.00 HYPERCHOLESTEROLEMIA: ICD-10-CM

## 2024-01-30 LAB
ANION GAP SERPL CALC-SCNC: 6 MMOL/L (ref 5–15)
APPEARANCE UR: CLEAR
BACTERIA URNS QL MICRO: NEGATIVE /HPF
BILIRUB UR QL: NEGATIVE
BUN SERPL-MCNC: 16 MG/DL (ref 6–20)
BUN/CREAT SERPL: 18 (ref 12–20)
CALCIUM SERPL-MCNC: 10.4 MG/DL (ref 8.5–10.1)
CHLORIDE SERPL-SCNC: 110 MMOL/L (ref 97–108)
CO2 SERPL-SCNC: 26 MMOL/L (ref 21–32)
COLOR UR: NORMAL
CREAT SERPL-MCNC: 0.87 MG/DL (ref 0.55–1.02)
EPITH CASTS URNS QL MICRO: NORMAL /LPF
ERYTHROCYTE [DISTWIDTH] IN BLOOD BY AUTOMATED COUNT: 13.3 % (ref 11.5–14.5)
GLUCOSE SERPL-MCNC: 88 MG/DL (ref 65–100)
GLUCOSE UR STRIP.AUTO-MCNC: NEGATIVE MG/DL
HCT VFR BLD AUTO: 36.3 % (ref 35–47)
HGB BLD-MCNC: 12.5 G/DL (ref 11.5–16)
HGB UR QL STRIP: NEGATIVE
HYALINE CASTS URNS QL MICRO: NORMAL /LPF (ref 0–5)
KETONES UR QL STRIP.AUTO: NEGATIVE MG/DL
LEUKOCYTE ESTERASE UR QL STRIP.AUTO: NEGATIVE
MCH RBC QN AUTO: 30.9 PG (ref 26–34)
MCHC RBC AUTO-ENTMCNC: 34.4 G/DL (ref 30–36.5)
MCV RBC AUTO: 89.6 FL (ref 80–99)
NITRITE UR QL STRIP.AUTO: NEGATIVE
NRBC # BLD: 0 K/UL (ref 0–0.01)
NRBC BLD-RTO: 0 PER 100 WBC
PH UR STRIP: 6 (ref 5–8)
PLATELET # BLD AUTO: 209 K/UL (ref 150–400)
PMV BLD AUTO: 9.9 FL (ref 8.9–12.9)
POTASSIUM SERPL-SCNC: 4.9 MMOL/L (ref 3.5–5.1)
PROT UR STRIP-MCNC: NEGATIVE MG/DL
RBC # BLD AUTO: 4.05 M/UL (ref 3.8–5.2)
RBC #/AREA URNS HPF: NORMAL /HPF (ref 0–5)
SODIUM SERPL-SCNC: 142 MMOL/L (ref 136–145)
SP GR UR REFRACTOMETRY: 1.01 (ref 1–1.03)
URINE CULTURE IF INDICATED: NORMAL
UROBILINOGEN UR QL STRIP.AUTO: 0.2 EU/DL (ref 0.2–1)
WBC # BLD AUTO: 6.1 K/UL (ref 3.6–11)
WBC URNS QL MICRO: NORMAL /HPF (ref 0–4)

## 2024-01-30 PROCEDURE — 3078F DIAST BP <80 MM HG: CPT | Performed by: FAMILY MEDICINE

## 2024-01-30 PROCEDURE — 1090F PRES/ABSN URINE INCON ASSESS: CPT | Performed by: FAMILY MEDICINE

## 2024-01-30 PROCEDURE — 1036F TOBACCO NON-USER: CPT | Performed by: FAMILY MEDICINE

## 2024-01-30 PROCEDURE — 1123F ACP DISCUSS/DSCN MKR DOCD: CPT | Performed by: FAMILY MEDICINE

## 2024-01-30 PROCEDURE — G8427 DOCREV CUR MEDS BY ELIG CLIN: HCPCS | Performed by: FAMILY MEDICINE

## 2024-01-30 PROCEDURE — G8484 FLU IMMUNIZE NO ADMIN: HCPCS | Performed by: FAMILY MEDICINE

## 2024-01-30 PROCEDURE — G8419 CALC BMI OUT NRM PARAM NOF/U: HCPCS | Performed by: FAMILY MEDICINE

## 2024-01-30 PROCEDURE — G8399 PT W/DXA RESULTS DOCUMENT: HCPCS | Performed by: FAMILY MEDICINE

## 2024-01-30 PROCEDURE — 3077F SYST BP >= 140 MM HG: CPT | Performed by: FAMILY MEDICINE

## 2024-01-30 PROCEDURE — G9899 SCRN MAM PERF RSLTS DOC: HCPCS | Performed by: FAMILY MEDICINE

## 2024-01-30 PROCEDURE — 3017F COLORECTAL CA SCREEN DOC REV: CPT | Performed by: FAMILY MEDICINE

## 2024-01-30 PROCEDURE — 99214 OFFICE O/P EST MOD 30 MIN: CPT | Performed by: FAMILY MEDICINE

## 2024-01-30 SDOH — ECONOMIC STABILITY: INCOME INSECURITY: HOW HARD IS IT FOR YOU TO PAY FOR THE VERY BASICS LIKE FOOD, HOUSING, MEDICAL CARE, AND HEATING?: NOT HARD AT ALL

## 2024-01-30 SDOH — ECONOMIC STABILITY: FOOD INSECURITY: WITHIN THE PAST 12 MONTHS, THE FOOD YOU BOUGHT JUST DIDN'T LAST AND YOU DIDN'T HAVE MONEY TO GET MORE.: NEVER TRUE

## 2024-01-30 SDOH — ECONOMIC STABILITY: FOOD INSECURITY: WITHIN THE PAST 12 MONTHS, YOU WORRIED THAT YOUR FOOD WOULD RUN OUT BEFORE YOU GOT MONEY TO BUY MORE.: NEVER TRUE

## 2024-01-30 ASSESSMENT — PATIENT HEALTH QUESTIONNAIRE - PHQ9
SUM OF ALL RESPONSES TO PHQ QUESTIONS 1-9: 0
SUM OF ALL RESPONSES TO PHQ9 QUESTIONS 1 & 2: 0
2. FEELING DOWN, DEPRESSED OR HOPELESS: 0
SUM OF ALL RESPONSES TO PHQ QUESTIONS 1-9: 0
1. LITTLE INTEREST OR PLEASURE IN DOING THINGS: 0
SUM OF ALL RESPONSES TO PHQ QUESTIONS 1-9: 0
SUM OF ALL RESPONSES TO PHQ QUESTIONS 1-9: 0

## 2024-01-30 ASSESSMENT — ENCOUNTER SYMPTOMS
ABDOMINAL PAIN: 0
COUGH: 0
BLOOD IN STOOL: 0
RHINORRHEA: 0
CHEST TIGHTNESS: 0
CONSTIPATION: 0
SHORTNESS OF BREATH: 0

## 2024-01-30 NOTE — PROGRESS NOTES
Chief Complaint   Patient presents with    Pre-op Exam       \"Have you been to the ER, urgent care clinic since your last visit?  Hospitalized since your last visit?\"    NO    “Have you seen or consulted any other health care providers outside of [facility] since your last visit?”    YES, [facility].        Faxed Release of Info form to [facility] To [phone].      Vitals:    24 1120   BP: (!) 159/78   Pulse:    Resp:    Temp:    SpO2:        Health Maintenance Due   Topic Date Due    Respiratory Syncytial Virus (RSV) Pregnant or age 60 yrs+ (1 - 1-dose 60+ series) Never done    DTaP/Tdap/Td vaccine (2 - Td or Tdap) 2024        The patient, Claudia Griffin, identity was verified by name and .

## 2024-01-30 NOTE — PROGRESS NOTES
Subjective:      Patient ID: Claudia Griffin is a 73 y.o. female.    HPI  Pre-op for TRK due pain and osteoarthritis in the right knee.     BP follow up:  Has hypertension and bicuspid aortic valve followed by cardiology.  Has had follow up with cardiology on last month and was cleared for surgery as per pt.  Records have been requested.    Also has sleep apnea and uses her CPAP nightly.    Compliant w/ medication, low salt diet, and exercise.    BP home monitoring 130s/70s and usually higher at her doctor visits.  No swelling, headache or dizziness.  No chest pain, SOB, palpitations    HM:  Mammo: 7/31/23  Colonoscopy: 5/12/23 Dr. Couch.  Repeat in 5 years.      Patient Active Problem List   Diagnosis    Bicuspid aortic valve    Essential hypertension    H/O: hypothyroidism    Osteoporosis    Encounter for medication monitoring    Osteopenia    Menopause       Current Outpatient Medications   Medication Sig Dispense Refill    ibuprofen (ADVIL;MOTRIN) 800 MG tablet TAKE 1 TABLET BY MOUTH THREE TIMES DAILY 90 tablet 2    calcium carb-cholecalciferol (CALCIUM+D3) 600-20 MG-MCG TABS Calcium + D      metroNIDAZOLE (METROCREAM) 0.75 % cream Apply 1 applicator topically 2 times daily      Kpivlh-YtNjw-UzXurd-FA-Omega (MULTIVITAMIN/MINERALS PO) Take 1 tablet by mouth daily      lisinopril (PRINIVIL;ZESTRIL) 5 MG tablet Take 1 tablet by mouth every evening 10 tablet 0    fluticasone (FLONASE) 50 MCG/ACT nasal spray USE 2 SPRAYS IN EACH NOSTRIL DAILY AS NEEDED FOR RHINITIS 48 g 3    ASPIRIN PO Take 81 mg by mouth every evening      BIOTIN PO Take by mouth daily      cetirizine (ZYRTEC) 10 MG tablet TAKE 1 TABLET DAILY      desoximetasone (TOPICORT) 0.25 % cream Apply topically 2 times daily as needed      raloxifene (EVISTA) 60 MG tablet Take by mouth daily       No current facility-administered medications for this visit.       Allergies   Allergen Reactions    Penicillin G Hives    Penicillins Rash         Past Medical

## 2024-01-31 ENCOUNTER — CLINICAL DOCUMENTATION (OUTPATIENT)
Age: 74
End: 2024-01-31

## 2024-01-31 LAB
EST. AVERAGE GLUCOSE BLD GHB EST-MCNC: 103 MG/DL
HBA1C MFR BLD: 5.2 % (ref 4–5.6)
INR PPP: 0.9 (ref 0.9–1.2)
PROTHROMBIN TIME: 10.3 SEC (ref 9.1–12)

## 2024-01-31 NOTE — PROGRESS NOTES
Pt pre-op forms, office notes, labs and EKG scanned and faxed today to [facility] attention to MANNY Thomas at [phone].

## 2024-02-07 ENCOUNTER — TELEPHONE (OUTPATIENT)
Age: 74
End: 2024-02-07

## 2024-02-07 NOTE — TELEPHONE ENCOUNTER
Hetal with [facility] is requesting the pre op visit to be faxed to her patient is having surgery today ( f ) [phone] and call back number is [phone]

## 2024-02-08 NOTE — TELEPHONE ENCOUNTER
Hetal with [facility] is requesting the pre op visit to be faxed to her patient is having surgery today ( f ) [phone] and call back number is [phone]  ----------------------------------------------------------------------------------    Spoke to Hetal and she stated the hospital would not send her the pre-op information and they need it to know how to take care of the patient.  I can't send her the information without the pt signing, correct?

## 2024-02-13 RX ORDER — CETIRIZINE HYDROCHLORIDE 10 MG/1
TABLET ORAL
Qty: 90 TABLET | Refills: 3 | Status: SHIPPED | OUTPATIENT
Start: 2024-02-13

## 2024-02-13 NOTE — TELEPHONE ENCOUNTER
Last appointment: 1/30/24  Next appointment: Advised to follow-up 7/30/24  Previous refill encounter(s): 2/16/23    Requested Prescriptions     Pending Prescriptions Disp Refills    cetirizine (ZYRTEC) 10 MG tablet [Pharmacy Med Name: CETIRIZINE TABS-OTC 10MG] 90 tablet 3     Sig: TAKE 1 TABLET DAILY         For Pharmacy Admin Tracking Only    Program: Medication Refill  CPA in place:    Recommendation Provided To:   Intervention Detail: New Rx: 1, reason: Patient Preference  Intervention Accepted By:   Gap Closed?:    Time Spent (min): 5

## 2024-05-07 ENCOUNTER — TELEPHONE (OUTPATIENT)
Facility: HOSPITAL | Age: 74
End: 2024-05-07

## 2024-05-07 DIAGNOSIS — G47.33 OSA (OBSTRUCTIVE SLEEP APNEA): Primary | ICD-10-CM

## 2024-05-07 NOTE — TELEPHONE ENCOUNTER
Patient called and LVM stating her device is not working per [facility] DME.  There are issues with the power supply.  Patient would like to have an order for new device sent to [facility].  Latest download in the chart.    Please write order for new device.    Thank you.      Kailey Neely,RRT,RPSGT, CSE

## 2024-05-07 NOTE — TELEPHONE ENCOUNTER
Encounter Diagnosis   Name Primary?    JULIO CESAR (obstructive sleep apnea) Yes         Orders Placed This Encounter   Procedures    DME Order for (Specify) as OP     - DME device ordered - ResMed Device with Heated Humidifer  / .   - Diagnosis: Obstructive Sleep Apnea (G47.33)  - Length of Need: Lifetime    Pressure Settin - 15 cmH2O     Nasal Cushion (Replace) 2 per month.     Nasal Interface Mask 1 every 3 months.    Headgear 1 every 6 months.     Filter(s) Disposable 2 per month.   Filter(s) Non-Disposable 1 every 6 months.      Water Chamber for Humidifier (Replace) 1 every 6 months.   Tubing with heating element 1 every 3 months.    Perform Mask Fitting per patient preference and comfort - replace as above.      Dotty Arango MD, FAA; NPI: [id number]  Electronically signed. 2024

## 2024-05-09 ENCOUNTER — CLINICAL DOCUMENTATION (OUTPATIENT)
Age: 74
End: 2024-05-09

## 2024-05-15 RX ORDER — RALOXIFENE HYDROCHLORIDE 60 MG/1
60 TABLET, FILM COATED ORAL DAILY
Qty: 90 TABLET | Refills: 3 | Status: SHIPPED | OUTPATIENT
Start: 2024-05-15

## 2024-05-15 NOTE — TELEPHONE ENCOUNTER
Last appointment: 1/30/24  Next appointment: Advised to follow-up 7/30/24  Previous refill encounter(s): 4/19/23    Requested Prescriptions     Pending Prescriptions Disp Refills    raloxifene (EVISTA) 60 MG tablet [Pharmacy Med Name: RALOXIFENE HCL TABS 60MG] 90 tablet 3     Sig: TAKE 1 TABLET DAILY         For Pharmacy Admin Tracking Only    Program: Medication Refill  CPA in place:    Recommendation Provided To:   Intervention Detail: New Rx: 1, reason: Patient Preference  Intervention Accepted By:   Gap Closed?:    Time Spent (min): 5

## 2024-07-19 ENCOUNTER — TELEPHONE (OUTPATIENT)
Age: 74
End: 2024-07-19

## 2024-08-02 NOTE — PROGRESS NOTES
Chief Complaint   Patient presents with    Annual Wellness Visit     medicare     Mammogram 1/30/2019    Colonoscopy 5/7/2018 by Dr. Alia Hernandez- repeat in 5 years    Patient states she had an eye exam 2/2019 by Dr. Morgan Sinclair. 1. Have you been to the ER, urgent care clinic since your last visit? Hospitalized since your last visit? No    2. Have you seen or consulted any other health care providers outside of the [address] [place] [address] since your last visit? Include any pap smears or colon screening.  Yes mammogram 1/30/2019 Detail Level: Zone Plan: -Pt states happy with Triple Rosacea Cream([employer]), BID PRN\\n-Pt states uses Triple Rosacea cream more in summer time.\\n-REC oil free, noncomedogenic make up and green undertones to help with redness\\n-REC CeraVe and Cetaphil products\\n-Discussed sulfacetamide sodium (acne) 10 % lotion (suspension)\\n-Pt will think about adding sulfacetamide, since on formulary

## 2024-08-15 ENCOUNTER — OFFICE VISIT (OUTPATIENT)
Age: 74
End: 2024-08-15
Payer: MEDICARE

## 2024-08-15 VITALS
OXYGEN SATURATION: 100 % | HEART RATE: 58 BPM | BODY MASS INDEX: 26.06 KG/M2 | DIASTOLIC BLOOD PRESSURE: 75 MMHG | HEIGHT: 61 IN | TEMPERATURE: 98.2 F | SYSTOLIC BLOOD PRESSURE: 148 MMHG | WEIGHT: 138 LBS

## 2024-08-15 DIAGNOSIS — I10 PRIMARY HYPERTENSION: ICD-10-CM

## 2024-08-15 DIAGNOSIS — G47.33 OSA (OBSTRUCTIVE SLEEP APNEA): Primary | ICD-10-CM

## 2024-08-15 PROCEDURE — G8427 DOCREV CUR MEDS BY ELIG CLIN: HCPCS | Performed by: INTERNAL MEDICINE

## 2024-08-15 PROCEDURE — 1123F ACP DISCUSS/DSCN MKR DOCD: CPT | Performed by: INTERNAL MEDICINE

## 2024-08-15 PROCEDURE — 3078F DIAST BP <80 MM HG: CPT | Performed by: INTERNAL MEDICINE

## 2024-08-15 PROCEDURE — 1036F TOBACCO NON-USER: CPT | Performed by: INTERNAL MEDICINE

## 2024-08-15 PROCEDURE — 3077F SYST BP >= 140 MM HG: CPT | Performed by: INTERNAL MEDICINE

## 2024-08-15 PROCEDURE — 99213 OFFICE O/P EST LOW 20 MIN: CPT | Performed by: INTERNAL MEDICINE

## 2024-08-15 PROCEDURE — 1090F PRES/ABSN URINE INCON ASSESS: CPT | Performed by: INTERNAL MEDICINE

## 2024-08-15 PROCEDURE — G9899 SCRN MAM PERF RSLTS DOC: HCPCS | Performed by: INTERNAL MEDICINE

## 2024-08-15 PROCEDURE — 3017F COLORECTAL CA SCREEN DOC REV: CPT | Performed by: INTERNAL MEDICINE

## 2024-08-15 PROCEDURE — G8419 CALC BMI OUT NRM PARAM NOF/U: HCPCS | Performed by: INTERNAL MEDICINE

## 2024-08-15 PROCEDURE — G8399 PT W/DXA RESULTS DOCUMENT: HCPCS | Performed by: INTERNAL MEDICINE

## 2024-08-15 RX ORDER — CEFUROXIME AXETIL 250 MG/1
TABLET ORAL
COMMUNITY
Start: 2024-06-27

## 2024-08-15 ASSESSMENT — SLEEP AND FATIGUE QUESTIONNAIRES
HOW LIKELY ARE YOU TO NOD OFF OR FALL ASLEEP WHILE SITTING AND READING: MODERATE CHANCE OF DOZING
HOW LIKELY ARE YOU TO NOD OFF OR FALL ASLEEP WHILE SITTING AND TALKING TO SOMEONE: WOULD NEVER DOZE
HOW LIKELY ARE YOU TO NOD OFF OR FALL ASLEEP WHILE LYING DOWN TO REST IN THE AFTERNOON WHEN CIRCUMSTANCES PERMIT: MODERATE CHANCE OF DOZING
ESS TOTAL SCORE: 8
HOW LIKELY ARE YOU TO NOD OFF OR FALL ASLEEP WHEN YOU ARE A PASSENGER IN A CAR FOR AN HOUR WITHOUT A BREAK: MODERATE CHANCE OF DOZING
HOW LIKELY ARE YOU TO NOD OFF OR FALL ASLEEP WHILE WATCHING TV: SLIGHT CHANCE OF DOZING
HOW LIKELY ARE YOU TO NOD OFF OR FALL ASLEEP WHILE SITTING QUIETLY AFTER LUNCH WITHOUT ALCOHOL: SLIGHT CHANCE OF DOZING
HOW LIKELY ARE YOU TO NOD OFF OR FALL ASLEEP IN A CAR, WHILE STOPPED FOR A FEW MINUTES IN TRAFFIC: WOULD NEVER DOZE
HOW LIKELY ARE YOU TO NOD OFF OR FALL ASLEEP WHILE SITTING INACTIVE IN A PUBLIC PLACE: WOULD NEVER DOZE

## 2024-08-15 NOTE — PATIENT INSTRUCTIONS
[address]  [place] [address]  Tel.  [phone]  Fax. [phone] [address]  [place] [address]  Tel.  [phone]  Fax. [phone] [address] [place] [address]  Tel.  [phone]  Fax. [phone]     Learning About CPAP for Sleep Apnea  What is CPAP?              CPAP is a small machine that you use at home every night while you sleep. It increases air pressure in your throat to keep your airway open. When you have sleep apnea, this can help you sleep better so you feel much better. CPAP stands for \"continuous positive airway pressure.\"  The CPAP machine will have one of the following:  A mask that covers your nose and mouth  Prongs that fit into your nose  A mask that covers your nose only, the most common type. This type is called NCPAP. The N stands for \"nasal.\"  Why is it done?  CPAP is usually the best treatment for obstructive sleep apnea. It is the first treatment choice and the most widely used. Your doctor may suggest CPAP if you have:  Moderate to severe sleep apnea.  Sleep apnea and coronary artery disease (CAD) or heart failure.  How does it help?  CPAP can help you have more normal sleep, so you feel less sleepy and more alert during the daytime.  CPAP may help keep heart failure or other heart problems from getting worse.  NCPAP may help lower your blood pressure.  If you use CPAP, your bed partner may also sleep better because you are not snoring or restless.  What are the side effects?  Some people who use CPAP have:  A dry or stuffy nose and a sore throat.  Irritated skin on the face.  Sore eyes.  Bloating.  If you have any of these problems, work with your doctor to fix them. Here are some things you can try:  Be sure the mask or nasal prongs fit well.  See if your doctor can adjust the pressure of your CPAP.  If your nose is dry, try a humidifier.  If your nose is runny or stuffy, try decongestant medicine or a steroid

## 2024-08-15 NOTE — PROGRESS NOTES
no JVD   Chest/Lungs:  symmetrical lung expansion ,clear lung fields on auscultation    CVS:  Normal rate, regular rhythm    Extremities:  No obvious rashes, absent edema    Neuro:  No focal deficits; No obvious tremor    Psych:  Normal eye contact; normal  affect, normal countenance          MARTELL NASCIMENTO MD, FAASM  Diplomate American Board of Sleep Medicine  Diplomate in Sleep Medicine - ABP    Electronically signed. 08/15/24

## 2024-08-20 RX ORDER — FLUTICASONE PROPIONATE 50 MCG
2 SPRAY, SUSPENSION (ML) NASAL DAILY PRN
Qty: 48 G | Refills: 3 | Status: SHIPPED | OUTPATIENT
Start: 2024-08-20

## 2024-08-20 NOTE — TELEPHONE ENCOUNTER
Last appointment: 24  Next appointment: 24  Previous refill encounter(s): 23    Requested Prescriptions     Pending Prescriptions Disp Refills    fluticasone (FLONASE) 50 MCG/ACT nasal spray [Pharmacy Med Name: FLUTICASONE PROP NASAL SPRAY 16GM 50MCG] 48 g 3     Si sprays by Nasal route daily as needed for Rhinitis         For Pharmacy Admin Tracking Only    Program: Medication Refill  CPA in place:    Recommendation Provided To:   Intervention Detail: New Rx: 1, reason: Patient Preference  Intervention Accepted By:   Gap Closed?:    Time Spent (min): 5

## 2024-11-11 ENCOUNTER — TELEPHONE (OUTPATIENT)
Age: 74
End: 2024-11-11

## 2024-11-11 NOTE — TELEPHONE ENCOUNTER
Patient left voicemail with office on 11/07/2024 stating she is going to [place] and would like to know what type of water to use in her machine when she is traveling. Patient stated they do not have distilled water where she is going and would it be okay to put tap water or regular bottled water into her device.

## 2024-11-11 NOTE — TELEPHONE ENCOUNTER
Spoke to patient and advised bottled water and last resort tap water but to empty reservoir every morning.

## 2024-11-12 ENCOUNTER — OFFICE VISIT (OUTPATIENT)
Age: 74
End: 2024-11-12
Payer: MEDICARE

## 2024-11-12 VITALS
HEIGHT: 61 IN | WEIGHT: 142.2 LBS | OXYGEN SATURATION: 99 % | BODY MASS INDEX: 26.85 KG/M2 | SYSTOLIC BLOOD PRESSURE: 149 MMHG | DIASTOLIC BLOOD PRESSURE: 72 MMHG | HEART RATE: 79 BPM | TEMPERATURE: 98.2 F | RESPIRATION RATE: 24 BRPM

## 2024-11-12 DIAGNOSIS — M79.2 NEURALGIA OF LEFT UPPER EXTREMITY: ICD-10-CM

## 2024-11-12 DIAGNOSIS — G45.3 AMAUROSIS FUGAX OF LEFT EYE: ICD-10-CM

## 2024-11-12 DIAGNOSIS — Z79.899 ENCOUNTER FOR LONG-TERM (CURRENT) USE OF MEDICATIONS: ICD-10-CM

## 2024-11-12 DIAGNOSIS — Q23.81 BICUSPID AORTIC VALVE: ICD-10-CM

## 2024-11-12 DIAGNOSIS — Z00.00 MEDICARE ANNUAL WELLNESS VISIT, SUBSEQUENT: Primary | ICD-10-CM

## 2024-11-12 DIAGNOSIS — I10 ESSENTIAL (PRIMARY) HYPERTENSION: ICD-10-CM

## 2024-11-12 DIAGNOSIS — G47.33 OSA ON CPAP: ICD-10-CM

## 2024-11-12 DIAGNOSIS — M79.18 MYALGIA, FOREARM: ICD-10-CM

## 2024-11-12 DIAGNOSIS — E78.00 HYPERCHOLESTEROLEMIA: ICD-10-CM

## 2024-11-12 LAB
ALBUMIN SERPL-MCNC: 3.9 G/DL (ref 3.5–5)
ALBUMIN/GLOB SERPL: 1.2 (ref 1.1–2.2)
ALP SERPL-CCNC: 59 U/L (ref 45–117)
ALT SERPL-CCNC: 21 U/L (ref 12–78)
ANION GAP SERPL CALC-SCNC: 3 MMOL/L (ref 2–12)
APPEARANCE UR: CLEAR
AST SERPL-CCNC: 14 U/L (ref 15–37)
BACTERIA URNS QL MICRO: NEGATIVE /HPF
BILIRUB SERPL-MCNC: 0.4 MG/DL (ref 0.2–1)
BILIRUB UR QL: NEGATIVE
BUN SERPL-MCNC: 18 MG/DL (ref 6–20)
BUN/CREAT SERPL: 22 (ref 12–20)
CALCIUM SERPL-MCNC: 9.8 MG/DL (ref 8.5–10.1)
CHLORIDE SERPL-SCNC: 111 MMOL/L (ref 97–108)
CHOLEST SERPL-MCNC: 224 MG/DL
CO2 SERPL-SCNC: 30 MMOL/L (ref 21–32)
COLOR UR: NORMAL
COMMENT:: NORMAL
CREAT SERPL-MCNC: 0.83 MG/DL (ref 0.55–1.02)
EPITH CASTS URNS QL MICRO: NORMAL /LPF
ERYTHROCYTE [DISTWIDTH] IN BLOOD BY AUTOMATED COUNT: 13.7 % (ref 11.5–14.5)
GLOBULIN SER CALC-MCNC: 3.3 G/DL (ref 2–4)
GLUCOSE SERPL-MCNC: 95 MG/DL (ref 65–100)
GLUCOSE UR STRIP.AUTO-MCNC: NEGATIVE MG/DL
HCT VFR BLD AUTO: 37.7 % (ref 35–47)
HDLC SERPL-MCNC: 92 MG/DL
HDLC SERPL: 2.4 (ref 0–5)
HGB BLD-MCNC: 12.4 G/DL (ref 11.5–16)
HGB UR QL STRIP: NEGATIVE
HYALINE CASTS URNS QL MICRO: NORMAL /LPF (ref 0–5)
KETONES UR QL STRIP.AUTO: NEGATIVE MG/DL
LDLC SERPL CALC-MCNC: 117.6 MG/DL (ref 0–100)
LEUKOCYTE ESTERASE UR QL STRIP.AUTO: NEGATIVE
MCH RBC QN AUTO: 30.4 PG (ref 26–34)
MCHC RBC AUTO-ENTMCNC: 32.9 G/DL (ref 30–36.5)
MCV RBC AUTO: 92.4 FL (ref 80–99)
NITRITE UR QL STRIP.AUTO: NEGATIVE
NRBC # BLD: 0 K/UL (ref 0–0.01)
NRBC BLD-RTO: 0 PER 100 WBC
PH UR STRIP: 6 (ref 5–8)
PLATELET # BLD AUTO: 210 K/UL (ref 150–400)
PMV BLD AUTO: 10.1 FL (ref 8.9–12.9)
POTASSIUM SERPL-SCNC: 4.7 MMOL/L (ref 3.5–5.1)
PROT SERPL-MCNC: 7.2 G/DL (ref 6.4–8.2)
PROT UR STRIP-MCNC: NEGATIVE MG/DL
RBC # BLD AUTO: 4.08 M/UL (ref 3.8–5.2)
RBC #/AREA URNS HPF: NORMAL /HPF (ref 0–5)
SODIUM SERPL-SCNC: 144 MMOL/L (ref 136–145)
SP GR UR REFRACTOMETRY: 1.01 (ref 1–1.03)
SPECIMEN HOLD: NORMAL
SPECIMEN HOLD: NORMAL
TRIGL SERPL-MCNC: 72 MG/DL
TSH SERPL DL<=0.05 MIU/L-ACNC: 1.74 UIU/ML (ref 0.36–3.74)
UROBILINOGEN UR QL STRIP.AUTO: 0.2 EU/DL (ref 0.2–1)
VLDLC SERPL CALC-MCNC: 14.4 MG/DL
WBC # BLD AUTO: 6 K/UL (ref 3.6–11)
WBC URNS QL MICRO: NORMAL /HPF (ref 0–4)

## 2024-11-12 PROCEDURE — 91320 SARSCV2 VAC 30MCG TRS-SUC IM: CPT | Performed by: FAMILY MEDICINE

## 2024-11-12 PROCEDURE — G0008 ADMIN INFLUENZA VIRUS VAC: HCPCS | Performed by: FAMILY MEDICINE

## 2024-11-12 PROCEDURE — 99214 OFFICE O/P EST MOD 30 MIN: CPT | Performed by: FAMILY MEDICINE

## 2024-11-12 RX ORDER — LISINOPRIL 5 MG/1
10 TABLET ORAL EVERY EVENING
Qty: 180 TABLET | Refills: 3 | Status: SHIPPED | OUTPATIENT
Start: 2024-11-12 | End: 2024-11-12 | Stop reason: SDUPTHER

## 2024-11-12 RX ORDER — LISINOPRIL 5 MG/1
10 TABLET ORAL EVERY EVENING
Qty: 180 TABLET | Refills: 3 | Status: SHIPPED | OUTPATIENT
Start: 2024-11-12

## 2024-11-12 RX ORDER — METHYLPREDNISOLONE 4 MG/1
TABLET ORAL
Qty: 1 KIT | Refills: 0 | Status: SHIPPED | OUTPATIENT
Start: 2024-11-12

## 2024-11-12 SDOH — ECONOMIC STABILITY: FOOD INSECURITY: WITHIN THE PAST 12 MONTHS, THE FOOD YOU BOUGHT JUST DIDN'T LAST AND YOU DIDN'T HAVE MONEY TO GET MORE.: NEVER TRUE

## 2024-11-12 SDOH — HEALTH STABILITY: PHYSICAL HEALTH: ON AVERAGE, HOW MANY DAYS PER WEEK DO YOU ENGAGE IN MODERATE TO STRENUOUS EXERCISE (LIKE A BRISK WALK)?: 4 DAYS

## 2024-11-12 SDOH — ECONOMIC STABILITY: FOOD INSECURITY: WITHIN THE PAST 12 MONTHS, YOU WORRIED THAT YOUR FOOD WOULD RUN OUT BEFORE YOU GOT MONEY TO BUY MORE.: NEVER TRUE

## 2024-11-12 SDOH — ECONOMIC STABILITY: INCOME INSECURITY: HOW HARD IS IT FOR YOU TO PAY FOR THE VERY BASICS LIKE FOOD, HOUSING, MEDICAL CARE, AND HEATING?: NOT HARD AT ALL

## 2024-11-12 SDOH — HEALTH STABILITY: PHYSICAL HEALTH: ON AVERAGE, HOW MANY MINUTES DO YOU ENGAGE IN EXERCISE AT THIS LEVEL?: 20 MIN

## 2024-11-12 ASSESSMENT — PATIENT HEALTH QUESTIONNAIRE - PHQ9
1. LITTLE INTEREST OR PLEASURE IN DOING THINGS: NOT AT ALL
SUM OF ALL RESPONSES TO PHQ QUESTIONS 1-9: 0
SUM OF ALL RESPONSES TO PHQ QUESTIONS 1-9: 0
SUM OF ALL RESPONSES TO PHQ9 QUESTIONS 1 & 2: 0
SUM OF ALL RESPONSES TO PHQ QUESTIONS 1-9: 0
SUM OF ALL RESPONSES TO PHQ QUESTIONS 1-9: 0

## 2024-11-12 ASSESSMENT — ANXIETY QUESTIONNAIRES
GAD7 TOTAL SCORE: 0
GAD7 TOTAL SCORE: 0
2. NOT BEING ABLE TO STOP OR CONTROL WORRYING: NOT AT ALL
3. WORRYING TOO MUCH ABOUT DIFFERENT THINGS: NOT AT ALL
7. FEELING AFRAID AS IF SOMETHING AWFUL MIGHT HAPPEN: NOT AT ALL
5. BEING SO RESTLESS THAT IT IS HARD TO SIT STILL: NOT AT ALL
4. TROUBLE RELAXING: NOT AT ALL
IF YOU CHECKED OFF ANY PROBLEMS ON THIS QUESTIONNAIRE, HOW DIFFICULT HAVE THESE PROBLEMS MADE IT FOR YOU TO DO YOUR WORK, TAKE CARE OF THINGS AT HOME, OR GET ALONG WITH OTHER PEOPLE: NOT DIFFICULT AT ALL
6. BECOMING EASILY ANNOYED OR IRRITABLE: NOT AT ALL
1. FEELING NERVOUS, ANXIOUS, OR ON EDGE: NOT AT ALL

## 2024-11-12 ASSESSMENT — ENCOUNTER SYMPTOMS
CONSTIPATION: 0
CHEST TIGHTNESS: 0
SHORTNESS OF BREATH: 0
ABDOMINAL PAIN: 0
RHINORRHEA: 0
COUGH: 0
BLOOD IN STOOL: 0

## 2024-11-12 ASSESSMENT — LIFESTYLE VARIABLES
HOW OFTEN DO YOU HAVE A DRINK CONTAINING ALCOHOL: 1
HOW MANY STANDARD DRINKS CONTAINING ALCOHOL DO YOU HAVE ON A TYPICAL DAY: PATIENT DOES NOT DRINK
HOW OFTEN DO YOU HAVE SIX OR MORE DRINKS ON ONE OCCASION: 1
HOW OFTEN DO YOU HAVE A DRINK CONTAINING ALCOHOL: NEVER
HOW MANY STANDARD DRINKS CONTAINING ALCOHOL DO YOU HAVE ON A TYPICAL DAY: 0

## 2024-11-12 NOTE — PROGRESS NOTES
Subjective:      Patient ID: Claudia Griffin is a 74 y.o. female.    HPI  Follow up on chronic medical problems.  Had an episode on last Friday when she was taking outside to another person the vision in her left eye 'went out\".  Everything in the left eye appear to be \"gray\" and she could not see the person that she was talking to out of the left eye.  Went into the bathroom to look in the mirror and she saw flashing lights out of the left eye for several minutes before that vision in the ye came back to normal.  Episode lasted about 15 minutes.  No headache or dizziness noted.  No chest pain, heart fluttering or SOB.  No further sx and she has felt her usual normal self since then.  She did not seek any medical attention at the time.      C/o right hand, arm and shoulder pain for the past couple of months.  Started after during year work and was lifting heavy rock in the yard to some landscaping.  Since then she has had pain.  Pain radiates up the arm from the hand to the shoulder and right side of the neck.  No  weakness or numbness noted.  Increase pain with ROM.   No previous hx of right shoulder problems noted.  Pain is 8/10 when at its worse.  Has only been taking occassional ibuprofen for the pain which does help.     BP follow up:  Has hypertension and bicuspid aortic valve followed by cardiology.  Has had follow up with cardiology.      Also has sleep apnea and uses her CPAP nightly.    Compliant w/ medication, low salt diet, and exercise.    BP home monitoring 130s/70s and usually higher at her doctor visits.  No swelling, headache or dizziness.  No chest pain, SOB, palpitations    HM:  Mammo: 7/31/23  Colonoscopy: 5/12/23 Dr. Couch.  Repeat in 5 years.      Past Medical History:   Diagnosis Date    Bicuspid aortic valve     Fibroid uterus     Hypertension     Obstructive sleep apnea on CPAP 2017    Osteoporosis     osteopenia now per pt per last 2 bone densities    Thyroid disease 1985    hx of

## 2024-11-12 NOTE — PROGRESS NOTES
Chief Complaint   Patient presents with    Medicare AWV         Here for medicare wellness       \"Have you been to the ER, urgent care clinic since your last visit?  Hospitalized since your last visit?\"    NO    “Have you seen or consulted any other health care providers outside of [facility] since your last visit?”    NO            Click Here for Release of Records Request

## 2024-11-12 NOTE — PROGRESS NOTES
Medicare Annual Wellness Visit    Claudia Griffin is here for Medicare AWV    Assessment & Plan   Medicare annual wellness visit, subsequent  Amaurosis fugax of left eye  -     External Referral To Ophthalmology  -     MRI BRAIN WO CONTRAST; Future  -     Vascular duplex carotid bilateral; Future  -     BS - Shemar Gay MD, Neurology, [place]  Essential (primary) hypertension  -     TSH; Future  -     lisinopril (PRINIVIL;ZESTRIL) 5 MG tablet; Take 2 tablets by mouth every evening, Disp-180 tablet, R-3Normal  Hypercholesterolemia  -     Lipid Panel; Future  Bicuspid aortic valve  JULIO CESAR on CPAP  Encounter for long-term (current) use of medications  -     Comprehensive Metabolic Panel; Future  -     CBC; Future  -     Urinalysis with Microscopic; Future  Myalgia, forearm  Neuralgia of left upper extremity  -     methylPREDNISolone (MEDROL DOSEPACK) 4 MG tablet; Take as directed., Disp-1 kit, R-0Normal    Recommendations for Preventive Services Due: see orders and patient instructions/AVS.  Recommended screening schedule for the next 5-10 years is provided to the patient in written form: see Patient Instructions/AVS.     Return in about 2 months (around 1/12/2025).     Subjective   Patient's complete Health Risk Assessment and screening values have been reviewed and are found in Flowsheets. The following problems were reviewed today and where indicated follow up appointments were made and/or referrals ordered.    Positive Risk Factor Screenings with Interventions:                 Dentist Screen:  Have you seen the dentist within the past year?: (!) No    Intervention:  See AVS for additional education material        Advanced Directives:  Do you have a Living Will?: (!) No    Intervention:  has NO advanced directive - information provided             Objective   Vitals:    11/12/24 1009 11/12/24 1011   BP: (!) 155/70 (!) 149/72   Pulse: 79    Resp: 24    Temp: 98.2 °F (36.8 °C)    SpO2: 99%    Weight: 64.5 kg

## 2024-11-12 NOTE — PATIENT INSTRUCTIONS

## 2024-11-13 ENCOUNTER — TELEPHONE (OUTPATIENT)
Age: 74
End: 2024-11-13

## 2024-11-13 DIAGNOSIS — F41.9 ANXIETY: Primary | ICD-10-CM

## 2024-11-13 NOTE — TELEPHONE ENCOUNTER
Pt has an MRI scheduled for 12/2/24. She is requesting a med to help her nerves during this procedure. Please advise and pend new Rx if appropriate.    Last visit:  11/12/24  Next appt:  1/14/25      For Pharmacy Admin Tracking Only    Program: Medication Refill  CPA in place:    Recommendation Provided To:   Intervention Detail: New Rx: 1, reason: Patient Preference  Intervention Accepted By:   Gap Closed?:    Time Spent (min): 5

## 2024-11-14 RX ORDER — LORAZEPAM 1 MG/1
TABLET ORAL
Qty: 4 TABLET | Refills: 0 | Status: SHIPPED | OUTPATIENT
Start: 2024-11-14 | End: 2025-01-01

## 2024-11-27 RX ORDER — CETIRIZINE HYDROCHLORIDE 10 MG/1
10 TABLET ORAL DAILY
Qty: 90 TABLET | Refills: 3 | Status: SHIPPED | OUTPATIENT
Start: 2024-11-27

## 2024-11-27 NOTE — TELEPHONE ENCOUNTER
Identified patient 2 identifiers verified.  Patient would like a refill on Zyrtec sent  [facility] and patient can be reached at [phone]. Last refill Zyrtec 10 mg and last visit 11/12/24

## 2024-11-29 ENCOUNTER — TELEPHONE (OUTPATIENT)
Age: 74
End: 2024-11-29

## 2024-11-29 NOTE — TELEPHONE ENCOUNTER
Patient is not interested in scheduling anything with us at this time. She wants to follow up with her cardiologist first.

## 2024-11-29 NOTE — TELEPHONE ENCOUNTER
Lm for pt to call us back trying to schedule a np (Amaurosis fugaz cf left eye) upon receiving an external referral from [facility]

## 2024-12-02 ENCOUNTER — HOSPITAL ENCOUNTER (OUTPATIENT)
Facility: HOSPITAL | Age: 74
Discharge: HOME | End: 2024-12-04
Attending: FAMILY MEDICINE
Payer: MEDICARE

## 2024-12-02 ENCOUNTER — HOSPITAL ENCOUNTER (OUTPATIENT)
Facility: HOSPITAL | Age: 74
Discharge: HOME | End: 2024-12-05
Attending: FAMILY MEDICINE
Payer: MEDICARE

## 2024-12-02 DIAGNOSIS — G45.3 AMAUROSIS FUGAX OF LEFT EYE: ICD-10-CM

## 2024-12-02 LAB
VAS LEFT CCA DIST EDV: 8.8 CM/S
VAS LEFT CCA DIST PSV: 47.9 CM/S
VAS LEFT CCA PROX EDV: 11.6 CM/S
VAS LEFT CCA PROX PSV: 70.9 CM/S
VAS LEFT ECA EDV: 8 CM/S
VAS LEFT ECA PSV: 52.2 CM/S
VAS LEFT ICA DIST EDV: 11.9 CM/S
VAS LEFT ICA DIST PSV: 50.1 CM/S
VAS LEFT ICA MID EDV: 10 CM/S
VAS LEFT ICA MID PSV: 40.7 CM/S
VAS LEFT ICA PROX EDV: 12.3 CM/S
VAS LEFT ICA PROX PSV: 54.3 CM/S
VAS LEFT ICA/CCA PSV: 1.1 NO UNITS
VAS LEFT VERTEBRAL EDV: 11.6 CM/S
VAS LEFT VERTEBRAL PSV: 50 CM/S
VAS RIGHT CCA DIST EDV: 14.2 CM/S
VAS RIGHT CCA DIST PSV: 50.5 CM/S
VAS RIGHT CCA PROX EDV: 9.7 CM/S
VAS RIGHT CCA PROX PSV: 78.5 CM/S
VAS RIGHT ECA EDV: 13.5 CM/S
VAS RIGHT ECA PSV: 72.8 CM/S
VAS RIGHT ICA DIST EDV: 26.2 CM/S
VAS RIGHT ICA DIST PSV: 101.1 CM/S
VAS RIGHT ICA MID EDV: 20.5 CM/S
VAS RIGHT ICA MID PSV: 58 CM/S
VAS RIGHT ICA PROX EDV: 8.8 CM/S
VAS RIGHT ICA PROX PSV: 52.5 CM/S
VAS RIGHT ICA/CCA PSV: 2 NO UNITS
VAS RIGHT VERTEBRAL EDV: 13.3 CM/S
VAS RIGHT VERTEBRAL PSV: 53.5 CM/S

## 2024-12-02 PROCEDURE — 70551 MRI BRAIN STEM W/O DYE: CPT

## 2024-12-02 PROCEDURE — 93880 EXTRACRANIAL BILAT STUDY: CPT

## 2025-01-14 ENCOUNTER — OFFICE VISIT (OUTPATIENT)
Age: 75
End: 2025-01-14
Payer: MEDICARE

## 2025-01-14 VITALS
WEIGHT: 143.2 LBS | DIASTOLIC BLOOD PRESSURE: 62 MMHG | OXYGEN SATURATION: 98 % | BODY MASS INDEX: 27.03 KG/M2 | TEMPERATURE: 98.8 F | HEIGHT: 61 IN | RESPIRATION RATE: 23 BRPM | HEART RATE: 72 BPM | SYSTOLIC BLOOD PRESSURE: 124 MMHG

## 2025-01-14 DIAGNOSIS — G47.33 OSA ON CPAP: ICD-10-CM

## 2025-01-14 DIAGNOSIS — Z79.899 ENCOUNTER FOR LONG-TERM (CURRENT) USE OF MEDICATIONS: ICD-10-CM

## 2025-01-14 DIAGNOSIS — Z86.69 HISTORY OF AMAUROSIS FUGAX: Primary | ICD-10-CM

## 2025-01-14 DIAGNOSIS — I10 ESSENTIAL (PRIMARY) HYPERTENSION: ICD-10-CM

## 2025-01-14 DIAGNOSIS — E78.00 HYPERCHOLESTEROLEMIA: ICD-10-CM

## 2025-01-14 DIAGNOSIS — Q23.81 BICUSPID AORTIC VALVE: ICD-10-CM

## 2025-01-14 PROCEDURE — 3078F DIAST BP <80 MM HG: CPT | Performed by: FAMILY MEDICINE

## 2025-01-14 PROCEDURE — 3017F COLORECTAL CA SCREEN DOC REV: CPT | Performed by: FAMILY MEDICINE

## 2025-01-14 PROCEDURE — G2211 COMPLEX E/M VISIT ADD ON: HCPCS | Performed by: FAMILY MEDICINE

## 2025-01-14 PROCEDURE — 3074F SYST BP LT 130 MM HG: CPT | Performed by: FAMILY MEDICINE

## 2025-01-14 PROCEDURE — 1126F AMNT PAIN NOTED NONE PRSNT: CPT | Performed by: FAMILY MEDICINE

## 2025-01-14 PROCEDURE — G8419 CALC BMI OUT NRM PARAM NOF/U: HCPCS | Performed by: FAMILY MEDICINE

## 2025-01-14 PROCEDURE — 99214 OFFICE O/P EST MOD 30 MIN: CPT | Performed by: FAMILY MEDICINE

## 2025-01-14 PROCEDURE — G8427 DOCREV CUR MEDS BY ELIG CLIN: HCPCS | Performed by: FAMILY MEDICINE

## 2025-01-14 PROCEDURE — 1159F MED LIST DOCD IN RCRD: CPT | Performed by: FAMILY MEDICINE

## 2025-01-14 PROCEDURE — 1036F TOBACCO NON-USER: CPT | Performed by: FAMILY MEDICINE

## 2025-01-14 PROCEDURE — 1123F ACP DISCUSS/DSCN MKR DOCD: CPT | Performed by: FAMILY MEDICINE

## 2025-01-14 PROCEDURE — 1160F RVW MEDS BY RX/DR IN RCRD: CPT | Performed by: FAMILY MEDICINE

## 2025-01-14 PROCEDURE — G8399 PT W/DXA RESULTS DOCUMENT: HCPCS | Performed by: FAMILY MEDICINE

## 2025-01-14 PROCEDURE — 1090F PRES/ABSN URINE INCON ASSESS: CPT | Performed by: FAMILY MEDICINE

## 2025-01-14 RX ORDER — ASPIRIN 325 MG
325 TABLET ORAL DAILY
COMMUNITY
Start: 2025-01-14

## 2025-01-14 SDOH — ECONOMIC STABILITY: FOOD INSECURITY: WITHIN THE PAST 12 MONTHS, THE FOOD YOU BOUGHT JUST DIDN'T LAST AND YOU DIDN'T HAVE MONEY TO GET MORE.: NEVER TRUE

## 2025-01-14 SDOH — ECONOMIC STABILITY: FOOD INSECURITY: WITHIN THE PAST 12 MONTHS, YOU WORRIED THAT YOUR FOOD WOULD RUN OUT BEFORE YOU GOT MONEY TO BUY MORE.: NEVER TRUE

## 2025-01-14 ASSESSMENT — ANXIETY QUESTIONNAIRES
GAD7 TOTAL SCORE: 0
3. WORRYING TOO MUCH ABOUT DIFFERENT THINGS: NOT AT ALL
5. BEING SO RESTLESS THAT IT IS HARD TO SIT STILL: NOT AT ALL
2. NOT BEING ABLE TO STOP OR CONTROL WORRYING: NOT AT ALL
1. FEELING NERVOUS, ANXIOUS, OR ON EDGE: NOT AT ALL
IF YOU CHECKED OFF ANY PROBLEMS ON THIS QUESTIONNAIRE, HOW DIFFICULT HAVE THESE PROBLEMS MADE IT FOR YOU TO DO YOUR WORK, TAKE CARE OF THINGS AT HOME, OR GET ALONG WITH OTHER PEOPLE: NOT DIFFICULT AT ALL
6. BECOMING EASILY ANNOYED OR IRRITABLE: NOT AT ALL
GAD7 TOTAL SCORE: 0
4. TROUBLE RELAXING: NOT AT ALL
7. FEELING AFRAID AS IF SOMETHING AWFUL MIGHT HAPPEN: NOT AT ALL

## 2025-01-14 ASSESSMENT — ENCOUNTER SYMPTOMS
RHINORRHEA: 0
ABDOMINAL PAIN: 0
CHEST TIGHTNESS: 0
CONSTIPATION: 0
SHORTNESS OF BREATH: 0
COUGH: 0
BLOOD IN STOOL: 0

## 2025-01-14 ASSESSMENT — PATIENT HEALTH QUESTIONNAIRE - PHQ9
SUM OF ALL RESPONSES TO PHQ QUESTIONS 1-9: 0
SUM OF ALL RESPONSES TO PHQ QUESTIONS 1-9: 0
2. FEELING DOWN, DEPRESSED OR HOPELESS: NOT AT ALL
1. LITTLE INTEREST OR PLEASURE IN DOING THINGS: NOT AT ALL
SUM OF ALL RESPONSES TO PHQ QUESTIONS 1-9: 0
SUM OF ALL RESPONSES TO PHQ QUESTIONS 1-9: 0
SUM OF ALL RESPONSES TO PHQ9 QUESTIONS 1 & 2: 0

## 2025-01-14 NOTE — PROGRESS NOTES
Chief Complaint   Patient presents with    2 Month Follow Up         Here for 2 month follow up.        \"Have you been to the ER, urgent care clinic since your last visit?  Hospitalized since your last visit?\"    NO    “Have you seen or consulted any other health care providers outside of [facility] since your last visit?”    NO            Click Here for Release of Records Request

## 2025-01-14 NOTE — PROGRESS NOTES
Subjective:      Patient ID: Claudia Griffin is a 74 y.o. female.    HPI  Follow up on chronic medical problems.  Follow up on an episode of amaurosis fugax in November.  Has had follow up with cardiology.  Echo and cardiac monitor unremarkable.  MRI brain was normal.  Carotid dopplers negative.  Has had follow up with ophthalmology. No further sx with vision noted .       BP follow up:  Has hypertension and bicuspid aortic valve followed by cardiology. Overall BP has been improved with increase dose of lisinopril.  Also has sleep apnea and uses her CPAP nightly.    Compliant w/ medication, low salt diet, and exercise.    BP home monitoring 130s/70s and usually higher at her doctor visits.  No swelling, headache or dizziness.  No chest pain, SOB, palpitations    HM:  Mammo: 1/17/2024  Colonoscopy: 5/12/23 Dr. Couch.  Repeat in 5 years.      Past Medical History:   Diagnosis Date    Bicuspid aortic valve     Fibroid uterus     Hypertension     Obstructive sleep apnea on CPAP 2017    Osteoporosis     osteopenia now per pt per last 2 bone densities    Thyroid disease 1985    hx of synthroid use/thyroid bloodwork normal now     Past Surgical History:   Procedure Laterality Date    BREAST BIOPSY Left 07/05/2023    COLONOSCOPY N/A 05/07/2018    COLONOSCOPY performed by Francois Couch MD at [facility] ENDOSCOPY    COLONOSCOPY  2015    COLONOSCOPY N/A 05/12/2023    COLONOSCOPY performed by Francois Couch MD at [facility] ENDOSCOPY    GYN      left ovarian cyst removed    GYN  8/2020    D&C    KNEE ARTHROSCOPY Bilateral     torn meniscus    ORTHOPEDIC SURGERY      cyst left hand    ORTHOPEDIC SURGERY      bakers cyst    TONSILLECTOMY      TOTAL KNEE ARTHROPLASTY Right 02/07/2024    RTKA (brenden) performed by Dr. Nehemias Simons at [facility]     Current Outpatient Medications   Medication Sig Dispense Refill    aspirin (DIGNA ASPIRIN) 325 MG tablet Take 1 tablet by mouth daily      cetirizine (ZYRTEC) 10 MG tablet Take 1 tablet by

## 2025-01-21 ENCOUNTER — HOSPITAL ENCOUNTER (OUTPATIENT)
Facility: HOSPITAL | Age: 75
Discharge: HOME | End: 2025-01-24
Attending: FAMILY MEDICINE
Payer: MEDICARE

## 2025-01-21 VITALS — HEIGHT: 61 IN | WEIGHT: 140 LBS | BODY MASS INDEX: 26.43 KG/M2

## 2025-01-21 DIAGNOSIS — Z12.31 VISIT FOR SCREENING MAMMOGRAM: ICD-10-CM

## 2025-01-21 PROCEDURE — 77063 BREAST TOMOSYNTHESIS BI: CPT

## 2025-01-30 ENCOUNTER — CLINICAL DOCUMENTATION (OUTPATIENT)
Age: 75
End: 2025-01-30

## 2025-01-30 NOTE — PROGRESS NOTES
Pt most recent lab results were faxed today to [facility] at [phone].

## 2025-02-04 ENCOUNTER — CLINICAL DOCUMENTATION (OUTPATIENT)
Age: 75
End: 2025-02-04

## 2025-02-04 NOTE — PROGRESS NOTES
Pt most recent labs abs and office notes were faxed today to [facility]  at [phone] as requested by fax.

## 2025-02-06 ENCOUNTER — TELEPHONE (OUTPATIENT)
Age: 75
End: 2025-02-06

## 2025-05-02 RX ORDER — RALOXIFENE HYDROCHLORIDE 60 MG/1
60 TABLET, FILM COATED ORAL DAILY
Qty: 90 TABLET | Refills: 0 | Status: SHIPPED | OUTPATIENT
Start: 2025-05-02

## 2025-05-02 NOTE — TELEPHONE ENCOUNTER
Last appointment: 1/14/25  Next appointment: 7/7/25  Previous refill encounter(s): 5/15/24    Requested Prescriptions     Pending Prescriptions Disp Refills    raloxifene (EVISTA) 60 MG tablet [Pharmacy Med Name: RALOXIFENE HCL TABS 60MG] 30 tablet 0     Sig: TAKE 1 TABLET DAILY         For Pharmacy Admin Tracking Only    Program: Medication Refill  CPA in place:    Recommendation Provided To:   Intervention Detail: New Rx: 1, reason: Patient Preference  Intervention Accepted By:   Gap Closed?:    Time Spent (min): 5

## 2025-06-02 RX ORDER — FLUTICASONE PROPIONATE 50 MCG
2 SPRAY, SUSPENSION (ML) NASAL DAILY PRN
Qty: 48 G | Refills: 3 | Status: SHIPPED | OUTPATIENT
Start: 2025-06-02

## 2025-06-02 NOTE — TELEPHONE ENCOUNTER
Last appointment: 25  Next appointment: 25  Previous refill encounter(s): 24    Requested Prescriptions     Pending Prescriptions Disp Refills    fluticasone (FLONASE) 50 MCG/ACT nasal spray 48 g 3     Si sprays by Nasal route daily as needed for Rhinitis         For Pharmacy Admin Tracking Only    Program: Medication Refill  CPA in place:    Recommendation Provided To:   Intervention Detail: New Rx: 1, reason: Patient Preference  Intervention Accepted By:   Gap Closed?:    Time Spent (min): 5

## 2025-07-07 ENCOUNTER — OFFICE VISIT (OUTPATIENT)
Age: 75
End: 2025-07-07
Payer: MEDICARE

## 2025-07-07 VITALS
HEIGHT: 61 IN | OXYGEN SATURATION: 98 % | WEIGHT: 143 LBS | DIASTOLIC BLOOD PRESSURE: 76 MMHG | HEART RATE: 61 BPM | BODY MASS INDEX: 27 KG/M2 | RESPIRATION RATE: 16 BRPM | SYSTOLIC BLOOD PRESSURE: 134 MMHG | TEMPERATURE: 98.4 F

## 2025-07-07 DIAGNOSIS — I10 ESSENTIAL (PRIMARY) HYPERTENSION: Primary | ICD-10-CM

## 2025-07-07 DIAGNOSIS — G47.33 OSA ON CPAP: ICD-10-CM

## 2025-07-07 DIAGNOSIS — Z86.69 HISTORY OF AMAUROSIS FUGAX: ICD-10-CM

## 2025-07-07 DIAGNOSIS — Z79.899 ENCOUNTER FOR LONG-TERM (CURRENT) USE OF MEDICATIONS: ICD-10-CM

## 2025-07-07 DIAGNOSIS — Q23.81 BICUSPID AORTIC VALVE: ICD-10-CM

## 2025-07-07 DIAGNOSIS — E78.00 HYPERCHOLESTEROLEMIA: ICD-10-CM

## 2025-07-07 PROCEDURE — G8427 DOCREV CUR MEDS BY ELIG CLIN: HCPCS | Performed by: FAMILY MEDICINE

## 2025-07-07 PROCEDURE — 1159F MED LIST DOCD IN RCRD: CPT | Performed by: FAMILY MEDICINE

## 2025-07-07 PROCEDURE — 3075F SYST BP GE 130 - 139MM HG: CPT | Performed by: FAMILY MEDICINE

## 2025-07-07 PROCEDURE — 1160F RVW MEDS BY RX/DR IN RCRD: CPT | Performed by: FAMILY MEDICINE

## 2025-07-07 PROCEDURE — 1036F TOBACCO NON-USER: CPT | Performed by: FAMILY MEDICINE

## 2025-07-07 PROCEDURE — 1090F PRES/ABSN URINE INCON ASSESS: CPT | Performed by: FAMILY MEDICINE

## 2025-07-07 PROCEDURE — 99214 OFFICE O/P EST MOD 30 MIN: CPT | Performed by: FAMILY MEDICINE

## 2025-07-07 PROCEDURE — G8419 CALC BMI OUT NRM PARAM NOF/U: HCPCS | Performed by: FAMILY MEDICINE

## 2025-07-07 PROCEDURE — 3078F DIAST BP <80 MM HG: CPT | Performed by: FAMILY MEDICINE

## 2025-07-07 PROCEDURE — 1126F AMNT PAIN NOTED NONE PRSNT: CPT | Performed by: FAMILY MEDICINE

## 2025-07-07 PROCEDURE — 3017F COLORECTAL CA SCREEN DOC REV: CPT | Performed by: FAMILY MEDICINE

## 2025-07-07 PROCEDURE — 1123F ACP DISCUSS/DSCN MKR DOCD: CPT | Performed by: FAMILY MEDICINE

## 2025-07-07 PROCEDURE — G8399 PT W/DXA RESULTS DOCUMENT: HCPCS | Performed by: FAMILY MEDICINE

## 2025-07-07 ASSESSMENT — PATIENT HEALTH QUESTIONNAIRE - PHQ9
SUM OF ALL RESPONSES TO PHQ QUESTIONS 1-9: 0
1. LITTLE INTEREST OR PLEASURE IN DOING THINGS: NOT AT ALL
SUM OF ALL RESPONSES TO PHQ QUESTIONS 1-9: 0
SUM OF ALL RESPONSES TO PHQ QUESTIONS 1-9: 0
2. FEELING DOWN, DEPRESSED OR HOPELESS: NOT AT ALL
SUM OF ALL RESPONSES TO PHQ QUESTIONS 1-9: 0

## 2025-07-07 ASSESSMENT — ENCOUNTER SYMPTOMS
BLOOD IN STOOL: 0
RHINORRHEA: 0
CHEST TIGHTNESS: 0
CONSTIPATION: 0
ABDOMINAL PAIN: 0
SHORTNESS OF BREATH: 0
COUGH: 0

## 2025-07-07 NOTE — PROGRESS NOTES
Chief Complaint   Patient presents with    6 Month Follow-Up     Patient is here today for a routine 6 month follow-up       \"Have you been to the ER, urgent care clinic since your last visit?  Hospitalized since your last visit?\"    NO    “Have you seen or consulted any other health care providers outside of [facility] since your last visit?”    NO            Click Here for Release of Records Request      Vitals:    25 1005   BP: 134/76   Pulse: 61   Resp: 16   Temp: 98.4 °F (36.9 °C)   SpO2: 98%       Health Maintenance Due   Topic Date Due    COVID-19 Vaccine ( season) 2025        The patient, Claudia Griffin, identity was verified by name and .

## 2025-07-07 NOTE — PROGRESS NOTES
Subjective:      Patient ID: Claudia Griffin is a 75 y.o. female.    HPI  Follow up on chronic medical problems.  Overall feeling well.    BP follow up:  Has hypertension and bicuspid aortic valve followed by cardiology. Overall BP has been improved with increase dose of lisinopril.  Also has sleep apnea and uses her CPAP nightly.    Experienced an episode of amaurosis fugax in November 2024.  Had follow up with cardiology.  Echo and cardiac monitor unremarkable.  MRI brain was normal.  Carotid dopplers negative.  Has had follow up with ophthalmology. No further sx with vision noted .     Compliant w/ medication, low salt diet, and exercise.    BP home monitoring 130s/70s and usually higher at her doctor visits.  No swelling, headache or dizziness.  No chest pain, SOB, palpitations    HM:  Mammo: 1/21/2025  Colonoscopy: 5/12/23 Dr. Couch.  Repeat in 5 years.      Past Medical History:   Diagnosis Date    Bicuspid aortic valve     Fibroid uterus     Hypertension     Obstructive sleep apnea on CPAP 2017    Osteoporosis     osteopenia now per pt per last 2 bone densities    Thyroid disease 1985    hx of synthroid use/thyroid bloodwork normal now     Past Surgical History:   Procedure Laterality Date    BREAST BIOPSY Left 07/05/2023    COLONOSCOPY N/A 05/07/2018    COLONOSCOPY performed by Francois Couch MD at [facility] ENDOSCOPY    COLONOSCOPY  2015    COLONOSCOPY N/A 05/12/2023    COLONOSCOPY performed by Francois Couch MD at [facility] ENDOSCOPY    GYN      left ovarian cyst removed    GYN  8/2020    D&C    KNEE ARTHROSCOPY Bilateral     torn meniscus    ORTHOPEDIC SURGERY      cyst left hand    ORTHOPEDIC SURGERY      bakers cyst    TONSILLECTOMY      TOTAL KNEE ARTHROPLASTY Right 02/07/2024    RTKA (brenden) performed by Dr. Nehemias Simons at [facility]     Current Outpatient Medications   Medication Sig Dispense Refill    fluticasone (FLONASE) 50 MCG/ACT nasal spray 2 sprays by Nasal route daily as needed for Rhinitis 48

## 2025-07-23 ASSESSMENT — SLEEP AND FATIGUE QUESTIONNAIRES
HOW LIKELY ARE YOU TO NOD OFF OR FALL ASLEEP WHEN YOU ARE A PASSENGER IN A CAR FOR AN HOUR WITHOUT A BREAK: SLIGHT CHANCE OF DOZING
HOW LIKELY ARE YOU TO NOD OFF OR FALL ASLEEP WHILE SITTING AND READING: MODERATE CHANCE OF DOZING
HOW LIKELY ARE YOU TO NOD OFF OR FALL ASLEEP WHILE SITTING INACTIVE IN A PUBLIC PLACE: SLIGHT CHANCE OF DOZING
ESS TOTAL SCORE: 7
HOW LIKELY ARE YOU TO NOD OFF OR FALL ASLEEP WHILE SITTING AND TALKING TO SOMEONE: WOULD NEVER DOZE
HOW LIKELY ARE YOU TO NOD OFF OR FALL ASLEEP WHILE LYING DOWN TO REST IN THE AFTERNOON WHEN CIRCUMSTANCES PERMIT: SLIGHT CHANCE OF DOZING
HOW LIKELY ARE YOU TO NOD OFF OR FALL ASLEEP WHILE WATCHING TV: SLIGHT CHANCE OF DOZING
HOW LIKELY ARE YOU TO NOD OFF OR FALL ASLEEP WHILE SITTING QUIETLY AFTER LUNCH WITHOUT ALCOHOL: SLIGHT CHANCE OF DOZING
HOW LIKELY ARE YOU TO NOD OFF OR FALL ASLEEP IN A CAR, WHILE STOPPED FOR A FEW MINUTES IN TRAFFIC: WOULD NEVER DOZE

## 2025-07-28 DIAGNOSIS — I10 ESSENTIAL (PRIMARY) HYPERTENSION: ICD-10-CM

## 2025-07-28 RX ORDER — CETIRIZINE HYDROCHLORIDE 10 MG/1
10 TABLET ORAL DAILY
Qty: 90 TABLET | Refills: 3 | Status: SHIPPED | OUTPATIENT
Start: 2025-07-28

## 2025-07-28 RX ORDER — LISINOPRIL 5 MG/1
10 TABLET ORAL EVERY EVENING
Qty: 180 TABLET | Refills: 3 | Status: SHIPPED | OUTPATIENT
Start: 2025-07-28

## 2025-07-28 RX ORDER — RALOXIFENE HYDROCHLORIDE 60 MG/1
60 TABLET, FILM COATED ORAL DAILY
Qty: 90 TABLET | Refills: 3 | Status: SHIPPED | OUTPATIENT
Start: 2025-07-28

## 2025-07-28 NOTE — TELEPHONE ENCOUNTER
Pt has moved and wants to change her meds to [facility].    Last appointment: 7/7/25  Next appointment: 11/18/25    Requested Prescriptions     Pending Prescriptions Disp Refills    lisinopril (PRINIVIL;ZESTRIL) 5 MG tablet 180 tablet 3     Sig: Take 2 tablets by mouth every evening    raloxifene (EVISTA) 60 MG tablet 90 tablet 3     Sig: Take 1 tablet by mouth daily    cetirizine (ZYRTEC) 10 MG tablet 90 tablet 3     Sig: Take 1 tablet by mouth daily         For Pharmacy Admin Tracking Only    Program: Medication Refill  CPA in place:    Recommendation Provided To:   Intervention Detail: New Rx: 3, reason: Patient Preference  Intervention Accepted By:   Gap Closed?:    Time Spent (min): 5

## 2025-08-12 ASSESSMENT — SLEEP AND FATIGUE QUESTIONNAIRES
DO YOU HAVE DIFFICULTY OPERATING A MOTOR VEHICLE FOR SHORT DISTANCES (LESS THAN 100 MILES) BECAUSE YOU BECOME SLEEPY: NO
HOW LIKELY ARE YOU TO NOD OFF OR FALL ASLEEP WHILE SITTING AND READING: MODERATE CHANCE OF DOZING
HOW LIKELY ARE YOU TO NOD OFF OR FALL ASLEEP WHEN YOU ARE A PASSENGER IN A CAR FOR AN HOUR WITHOUT A BREAK: SLIGHT CHANCE OF DOZING
HOW LIKELY ARE YOU TO NOD OFF OR FALL ASLEEP IN A CAR, WHILE STOPPED FOR A FEW MINUTES IN TRAFFIC: WOULD NEVER DOZE
HOW LIKELY ARE YOU TO NOD OFF OR FALL ASLEEP WHILE SITTING AND TALKING TO SOMEONE: WOULD NEVER DOZE
HOW LIKELY ARE YOU TO NOD OFF OR FALL ASLEEP WHEN YOU ARE A PASSENGER IN A CAR FOR AN HOUR WITHOUT A BREAK: SLIGHT CHANCE OF DOZING
HOW LIKELY ARE YOU TO NOD OFF OR FALL ASLEEP WHILE LYING DOWN TO REST IN THE AFTERNOON WHEN CIRCUMSTANCES PERMIT: SLIGHT CHANCE OF DOZING
HOW LIKELY ARE YOU TO NOD OFF OR FALL ASLEEP WHILE SITTING QUIETLY AFTER LUNCH WITHOUT ALCOHOL: SLIGHT CHANCE OF DOZING
HOW LIKELY ARE YOU TO NOD OFF OR FALL ASLEEP WHILE SITTING AND TALKING TO SOMEONE: WOULD NEVER DOZE
HOW LIKELY ARE YOU TO NOD OFF OR FALL ASLEEP WHILE SITTING INACTIVE IN A PUBLIC PLACE: SLIGHT CHANCE OF DOZING
HOW LIKELY ARE YOU TO NOD OFF OR FALL ASLEEP WHILE SITTING QUIETLY AFTER LUNCH WITHOUT ALCOHOL: SLIGHT CHANCE OF DOZING
HOW LIKELY ARE YOU TO NOD OFF OR FALL ASLEEP WHILE SITTING AND READING: MODERATE CHANCE OF DOZING
DO YOU HAVE DIFFICULTY WATCHING A MOVIE OR VIDEO BECAUSE YOU BECOME SLEEPY OR TIRED: NO
HOW LIKELY ARE YOU TO NOD OFF OR FALL ASLEEP WHILE SITTING INACTIVE IN A PUBLIC PLACE: SLIGHT CHANCE OF DOZING
DO YOU GENERALLY HAVE DIFFICULTY REMEMBERING THINGS BECAUSE YOU ARE SLEEPY OR TIRED: NO
DO YOU HAVE DIFFICULTY VISITING YOUR FAMILY OR FRIENDS IN THEIR HOME BECAUSE YOU BECOME SLEEPY OR TIRED: NO
DO YOU HAVE DIFFICULTY BEING AS ACTIVE AS YOU WANT TO BE IN THE MORNING BECAUSE YOU ARE SLEEPY OR TIRED: NO
HOW LIKELY ARE YOU TO NOD OFF OR FALL ASLEEP WHILE WATCHING TV: SLIGHT CHANCE OF DOZING
DO YOU HAVE DIFFICULTY OPERATING A MOTOR VEHICLE FOR LONG DISTANCES (GREATER THAN 100 MILES) BECAUSE YOU BECOME SLEEPY: YES, A LITTLE
HOW LIKELY ARE YOU TO NOD OFF OR FALL ASLEEP IN A CAR, WHILE STOPPED FOR A FEW MINUTES IN TRAFFIC: WOULD NEVER DOZE
FOSQ SCORE: 19
HAS YOUR RELATIONSHIP WITH FAMILY, FRIENDS OR WORK COLLEAGUES BEEN AFFECTED BECAUSE YOU ARE SLEEPY OR TIRED: NO
HOW LIKELY ARE YOU TO NOD OFF OR FALL ASLEEP WHILE WATCHING TV: SLIGHT CHANCE OF DOZING
DO YOU HAVE DIFFICULTY BEING AS ACTIVE AS YOU WANT TO BE IN THE EVENING BECAUSE YOU ARE SLEEPY OR TIRED: YES, LITTLE
HOW LIKELY ARE YOU TO NOD OFF OR FALL ASLEEP WHILE LYING DOWN TO REST IN THE AFTERNOON WHEN CIRCUMSTANCES PERMIT: SLIGHT CHANCE OF DOZING
DO YOU HAVE DIFFICULTY CONCENTRATING ON THE THINGS YOU DO BECAUSE YOU ARE SLEEPY OR TIRED: NO
HAS YOUR MOOD BEEN AFFECTED BECAUSE YOU ARE SLEEPY OR TIRED: NO
ESS TOTAL SCORE: 7

## 2025-08-15 ENCOUNTER — TELEMEDICINE (OUTPATIENT)
Age: 75
End: 2025-08-15
Payer: MEDICARE

## 2025-08-15 DIAGNOSIS — I10 PRIMARY HYPERTENSION: ICD-10-CM

## 2025-08-15 DIAGNOSIS — G47.33 OSA (OBSTRUCTIVE SLEEP APNEA): Primary | ICD-10-CM

## 2025-08-15 PROCEDURE — G8427 DOCREV CUR MEDS BY ELIG CLIN: HCPCS | Performed by: INTERNAL MEDICINE

## 2025-08-15 PROCEDURE — 1090F PRES/ABSN URINE INCON ASSESS: CPT | Performed by: INTERNAL MEDICINE

## 2025-08-15 PROCEDURE — 1160F RVW MEDS BY RX/DR IN RCRD: CPT | Performed by: INTERNAL MEDICINE

## 2025-08-15 PROCEDURE — 1159F MED LIST DOCD IN RCRD: CPT | Performed by: INTERNAL MEDICINE

## 2025-08-15 PROCEDURE — 99213 OFFICE O/P EST LOW 20 MIN: CPT | Performed by: INTERNAL MEDICINE

## 2025-08-15 PROCEDURE — 1036F TOBACCO NON-USER: CPT | Performed by: INTERNAL MEDICINE

## 2025-08-15 PROCEDURE — G8399 PT W/DXA RESULTS DOCUMENT: HCPCS | Performed by: INTERNAL MEDICINE

## 2025-08-15 PROCEDURE — 1123F ACP DISCUSS/DSCN MKR DOCD: CPT | Performed by: INTERNAL MEDICINE

## 2025-08-15 PROCEDURE — 3017F COLORECTAL CA SCREEN DOC REV: CPT | Performed by: INTERNAL MEDICINE

## 2025-08-15 PROCEDURE — G8419 CALC BMI OUT NRM PARAM NOF/U: HCPCS | Performed by: INTERNAL MEDICINE

## 2025-08-21 ENCOUNTER — CLINICAL DOCUMENTATION (OUTPATIENT)
Age: 75
End: 2025-08-21

## (undated) DEVICE — BAG BELONG PT PERS CLEAR HANDL

## (undated) DEVICE — QUILTED PREMIUM COMFORT UNDERPAD,EXTRA HEAVY: Brand: WINGS

## (undated) DEVICE — Z DISCONTINUED NO SUB IDED SET EXTN W/ 4 W STPCOCK M SPIN LOK 36IN

## (undated) DEVICE — KENDALL RADIOLUCENT FOAM MONITORING ELECTRODE -RECTANGULAR SHAPE: Brand: KENDALL

## (undated) DEVICE — WRISTBAND ID AD W1XL11.5IN RED POLY ALRG PREPRINTED PERM

## (undated) DEVICE — Device

## (undated) DEVICE — SET ADMIN 16ML TBNG L100IN 2 Y INJ SITE IV PIGGY BK DISP

## (undated) DEVICE — BW-412T DISP COMBO CLEANING BRUSH: Brand: SINGLE USE COMBINATION CLEANING BRUSH

## (undated) DEVICE — SOLIDIFIER FLUID 3000 CC ABSORB

## (undated) DEVICE — NEONATAL-ADULT SPO2 SENSOR: Brand: NELLCOR

## (undated) DEVICE — BITE BLK ENDOSCP AD 54FR GRN POLYETH ENDOSCP W STRP SLD

## (undated) DEVICE — Z DISCONTINUED USE 2751540 TUBING IRRIG L10IN DISP PMP ENDOGATOR

## (undated) DEVICE — KIT IV STRT W CHLORAPREP PD 1ML

## (undated) DEVICE — CANN NASAL O2 CAPNOGRAPHY AD -- FILTERLINE

## (undated) DEVICE — AIRLIFE™ U/CONNECT-IT OXYGEN TUBING 7 FEET (2.1 M) CRUSH-RESISTANT OXYGEN TUBING, VINYL TIPPED: Brand: AIRLIFE™

## (undated) DEVICE — Device: Brand: MEDICAL ACTION INDUSTRIES

## (undated) DEVICE — 1200 GUARD II KIT W/5MM TUBE W/O VAC TUBE: Brand: GUARDIAN

## (undated) DEVICE — SYRINGE MED 20ML STD CLR PLAS LUERLOCK TIP N CTRL DISP

## (undated) DEVICE — ENDO CARRY-ON PROCEDURE KIT INCLUDES ENZYMATIC SPONGE, GAUZE, BIOHAZARD LABEL, TRAY, LUBRICANT, DIRTY SCOPE LABEL, WATER LABEL, TRAY, DRAWSTRING PAD, AND DEFENDO 4-PIECE KIT.: Brand: ENDO CARRY-ON PROCEDURE KIT

## (undated) DEVICE — CATH IV AUTOGRD BC BLU 22GA 25 -- INSYTE

## (undated) DEVICE — CONNECTOR TBNG AUX H2O JET DISP FOR OLY 160/180 SER

## (undated) DEVICE — NEEDLE HYPO 18GA L1.5IN PNK S STL HUB POLYPR SHLD REG BVL

## (undated) DEVICE — CONTAINER SPEC 20 ML LID NEUT BUFF FORMALIN 10 % POLYPR STS

## (undated) DEVICE — FORCEPS BX L240CM JAW DIA2.8MM L CAP W/ NDL MIC MESH TOOTH

## (undated) DEVICE — BAG SPEC BIOHZD LF 2MIL 6X10IN -- CONVERT TO ITEM 357326

## (undated) DEVICE — SYR 50ML SLIP TIP NSAF LF STRL --